# Patient Record
Sex: FEMALE | Race: WHITE | Employment: OTHER | URBAN - METROPOLITAN AREA
[De-identification: names, ages, dates, MRNs, and addresses within clinical notes are randomized per-mention and may not be internally consistent; named-entity substitution may affect disease eponyms.]

---

## 2017-01-12 ENCOUNTER — HOSPITAL ENCOUNTER (OUTPATIENT)
Dept: MAMMOGRAPHY | Age: 72
Discharge: HOME | End: 2017-01-12
Attending: FAMILY MEDICINE
Payer: MEDICARE

## 2017-01-12 DIAGNOSIS — Z78.0 POSTMENOPAUSAL: ICD-10-CM

## 2017-01-12 DIAGNOSIS — Z12.31 ENCOUNTER FOR SCREENING MAMMOGRAM FOR MALIGNANT NEOPLASM OF BREAST: ICD-10-CM

## 2017-01-12 PROCEDURE — 77067 SCR MAMMO BI INCL CAD: CPT

## 2017-01-12 PROCEDURE — 77080 DXA BONE DENSITY AXIAL: CPT

## 2017-01-25 ENCOUNTER — PATIENT OUTREACH (OUTPATIENT)
Dept: CASE MANAGEMENT | Age: 72
End: 2017-01-25

## 2017-01-25 NOTE — PROGRESS NOTES
CCM follow up call. Unable to reach patient. Left message with contact information requesting a return call. This note will not be viewable in [address]

## 2017-02-09 ENCOUNTER — PATIENT OUTREACH (OUTPATIENT)
Dept: CASE MANAGEMENT | Age: 72
End: 2017-02-09

## 2017-02-23 ENCOUNTER — PATIENT OUTREACH (OUTPATIENT)
Dept: CASE MANAGEMENT | Age: 72
End: 2017-02-23

## 2017-02-24 ENCOUNTER — PATIENT OUTREACH (OUTPATIENT)
Dept: CASE MANAGEMENT | Age: 72
End: 2017-02-24

## 2017-03-03 ENCOUNTER — PATIENT OUTREACH (OUTPATIENT)
Dept: CASE MANAGEMENT | Age: 72
End: 2017-03-03

## 2017-03-03 NOTE — PROGRESS NOTES
CCM follow up call. Patient requested a home visit to assist with insurance paperwork regarding home aide assistance for . Home visit scheduled 3/6/17. This note will not be viewable in [address]

## 2017-03-06 ENCOUNTER — PATIENT OUTREACH (OUTPATIENT)
Dept: CASE MANAGEMENT | Age: 72
End: 2017-03-06

## 2017-03-06 NOTE — PROGRESS NOTES
CCM home visit to assist patient with notifying insurance company regarding [facility] policy. Patient requested assistance due to feeling overwhelmed with 's declining health. Insurance company to mail package to be completed and returned. Patient to notify [facility] is assistance is required with completing forms. Patient reports she continues to attend Alzheimer's support group. This note will not be viewable in [address]

## 2017-03-20 ENCOUNTER — PATIENT OUTREACH (OUTPATIENT)
Dept: CASE MANAGEMENT | Age: 72
End: 2017-03-20

## 2017-03-20 NOTE — PROGRESS NOTES
Received call from patient regarding patient's  and his progress with CLTC. Patient has no issues regarding her health. She continues to provide care for  and to attend support meetings monthly. Patient in the process of obtaining assistance with providing care for her  once  is assessed by [employer]. When insurance agency approves assistance patient will notify home care agencies provided by [facility]. Episode resolved with patient. Patient to notify CCM with any future issues/concerns. This note will not be viewable in [address]

## 2018-02-01 ENCOUNTER — HOSPITAL ENCOUNTER (OUTPATIENT)
Dept: GENERAL RADIOLOGY | Age: 73
Discharge: HOME | End: 2018-02-01
Attending: FAMILY MEDICINE
Payer: MEDICARE

## 2018-02-01 DIAGNOSIS — R05.9 COUGH: ICD-10-CM

## 2018-02-01 PROCEDURE — 71046 X-RAY EXAM CHEST 2 VIEWS: CPT

## 2018-03-09 ENCOUNTER — HOSPITAL ENCOUNTER (OUTPATIENT)
Dept: MAMMOGRAPHY | Age: 73
Discharge: HOME | End: 2018-03-09
Attending: FAMILY MEDICINE
Payer: MEDICARE

## 2018-03-09 DIAGNOSIS — Z12.31 VISIT FOR SCREENING MAMMOGRAM: ICD-10-CM

## 2018-03-09 PROCEDURE — 77063 BREAST TOMOSYNTHESIS BI: CPT

## 2018-06-18 ENCOUNTER — HOSPITAL ENCOUNTER (OUTPATIENT)
Dept: GENERAL RADIOLOGY | Age: 73
Discharge: HOME | End: 2018-06-18
Payer: MEDICARE

## 2018-06-18 DIAGNOSIS — R22.41 KNEE MASS, RIGHT: ICD-10-CM

## 2018-06-18 PROCEDURE — 73562 X-RAY EXAM OF KNEE 3: CPT

## 2019-04-10 ENCOUNTER — HOSPITAL ENCOUNTER (OUTPATIENT)
Dept: MAMMOGRAPHY | Age: 74
Discharge: HOME | End: 2019-04-10
Attending: FAMILY MEDICINE
Payer: MEDICARE

## 2019-04-10 DIAGNOSIS — Z12.31 VISIT FOR SCREENING MAMMOGRAM: ICD-10-CM

## 2019-04-10 PROCEDURE — 77063 BREAST TOMOSYNTHESIS BI: CPT

## 2019-05-31 ENCOUNTER — APPOINTMENT (OUTPATIENT)
Dept: PAIN MANAGEMENT | Age: 74
End: 2019-05-31

## 2019-06-14 ENCOUNTER — HOSPITAL ENCOUNTER (OUTPATIENT)
Dept: CT IMAGING | Age: 74
Discharge: HOME | End: 2019-06-14
Attending: FAMILY MEDICINE

## 2019-06-14 DIAGNOSIS — R06.02 SHORTNESS OF BREATH: ICD-10-CM

## 2019-06-26 ENCOUNTER — APPOINTMENT (OUTPATIENT)
Dept: PHYSICAL THERAPY | Age: 74
End: 2019-06-26

## 2019-06-28 ENCOUNTER — HOSPITAL ENCOUNTER (OUTPATIENT)
Dept: PHYSICAL THERAPY | Age: 74
Discharge: HOME | End: 2019-06-28
Payer: MEDICARE

## 2019-06-28 DIAGNOSIS — G89.29 CHRONIC RIGHT-SIDED LOW BACK PAIN WITH RIGHT-SIDED SCIATICA: ICD-10-CM

## 2019-06-28 DIAGNOSIS — M54.41 CHRONIC RIGHT-SIDED LOW BACK PAIN WITH RIGHT-SIDED SCIATICA: ICD-10-CM

## 2019-06-28 PROCEDURE — 97112 NEUROMUSCULAR REEDUCATION: CPT

## 2019-06-28 PROCEDURE — 97161 PT EVAL LOW COMPLEX 20 MIN: CPT

## 2019-06-28 NOTE — THERAPY EVALUATION
Torie Wirtz : 1945 [place] Secondary: Scott Kurtz 77 at . Tiffanie Ordonez [address] [place] [address], [address] [place] [address] [address] [phone]   [phone] Visit Count:  1 
  
OUTPATIENT PHYSICAL THERAPY:Initial Assessment 2019 ICD-10: Treatment Diagnosis: Chronic right-sided low back pain with right-sided sciatica [M54.41, G89.29], Pain in Right Hip (M25.551) Difficulty in walking, Not elsewhere classified (R26.2) Low Back Pain (M54.5) Abnormal posture (R29.3) Precautions/Allergies:  
Sulfa (sulfonamide antibiotics) MD Orders: evaluate and treat  MEDICAL/REFERRING DIAGNOSIS: 
Chronic right-sided low back pain with right-sided sciatica [M54.41, G89.29] DATE OF ONSET: 5 years + ago REFERRING PHYSICIAN: Natalie Beltran MD 
RETURN PHYSICIAN APPOINTMENT: not specified INITIAL ASSESSMENT:  Ms. Alan Lares presents with functional limitations due to chronic sciatic-type symptoms. Evaluation reveals poor postural awareness and habits, decreased pain management, limited AROM of lumbar spine and hip, core weakness and s/s of possible pelvic innominate dysfunction. Pt will highly benefit from skilled PT to address problems below and improve quality of life. PROBLEM LIST (Impacting functional limitations): 1. Decreased Strength 2. Decreased ADL/Functional Activities 3. Decreased Transfer Abilities 4. Decreased Ambulation Ability/Technique 5. Decreased Balance 6. Increased Pain 7. Decreased Activity Tolerance 8. Decreased Flexibility/Joint Mobility 9. Decreased Knowledge of Precautions 10. Decreased [place] with Home Exercise Program INTERVENTIONS PLANNED: (Treatment may consist of any combination of the following) 1. Balance Exercise 2. Bed Mobility 3. Cold 4. Gait Training 5. Heat 6. Home Exercise Program (HEP) 7. Manual Therapy 8. Neuromuscular Re-education/Strengthening 9. Range of Motion (ROM) 10. Therapeutic Activites 11. Therapeutic Exercise/Strengthening TREATMENT PLAN: 
Effective Dates: 6/28/2019 TO 9/26/2019 (90 days). Frequency/Duration: 2 times a week for 90 Day(s) GOALS: (Goals have been discussed and agreed upon with patient.) SHORT-TERM FUNCTIONAL GOALS: Time Frame: 2-4 weeks 1. Pt will be independent with HEP focusing on core stability and promoting good spinal alignment. 2. Pt will report no symptoms of LE for 1-2 weeks demonstrating centralization of symptoms. 3. Pt will report pain level does not exceed 4/10 in a 1-2 week period of time to demonstrate pain management. DISCHARGE GOALS: Time Frame: 6-8 weeks 1. Pt will improve Oswestry score by at least 10 points 2. Pt will demonstrate functional AROM of lumbar spine all planes without report of pain to improve functional mobility. 3. Pt will be able to sustain regular exercise routine including aerobic exercise 3+ times per week without increased symptoms. 4. Pt will be able to play 18 holes of golf without significant right LE exacerbation demonstrating ability to return to active lifestyle without nerve irritation. .  
5. Pt will demonstrate at least 4+/5 right hip to improve gait and transfer mechanics. OUTCOME MEASURE:  
Tool Used: Modified Oswestry Low Back Pain Questionnaire Score:  Initial: 13/50  Most Recent: X/50 (Date: -- ) Interpretation of Score: Each section is scored on a 0-5 scale, 5 representing the greatest disability. The scores of each section are added together for a total score of 50. MEDICAL NECESSITY:  
· Patient is expected to demonstrate progress in strength, range of motion, balance and functional technique ·  to increase independence with ADLs and recreational activities · . REASON FOR SERVICES/OTHER COMMENTS: 
· Patient continues to require modification of therapeutic interventions to increase complexity of exercises · . Total Duration: PT Patient Time In/Time Out Time In: 1100 Time Out: 1200 Rehabilitation Potential For Stated Goals: Good Regarding Annelise Mccullough's therapy, I certify that the treatment plan above will be carried out by a therapist or under their direction. Thank you for this referral, 
Tami Leung, PT Referring Physician Signature: Messi Madison MD _______________________________ Date _____________ PAIN/SUBJECTIVE:  
Initial:   4/10 Post Session:  2/10 HISTORY:  
History of Injury/Illness (Reason for Referral): 
Pt states chronic low back with and sciatic symptoms over the last 10 years, intermittently. Had MRI about 5 years ago, stating bulging and degeneration at L4-5. Was offered steriodal epidural injections but refused . 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 When , pt reports she was more sedentary but would like to return to golf a few times per week but low back and LE pain prevents this activity. Pt states she is trying to go to gym at least 1 time per week but nervous about injuring her back. Symptoms: intermittent B low back pain, constant right buttock, lateral thigh pain with occasional groin involevment and LE lateral foot numbness Aggravating Factors: sitting, prolonged walking, squatting, and bending Relieving Factors: movement, standing up, motrin   
Pain: 5/10 presently, 10/10 best, 2/10 best  
 
Past Medical History/Comorbidities: Ms. Alondra Ardon  has a past medical history of Anxiety (3/13/2013), Chest pain, unspecified (3/13/2013), Dyslipidemia (3/13/2013), GERD (gastroesophageal reflux disease) (3/13/2013), Hypercholesterolemia, and Unstable ang[facility] ([facility] (3/13/2013). Ms. Alondra Ardon  has a past surgical history that includes hx pelvic laparoscopy; hx colonoscopy (2010); and hx breast biopsy. Social History/Living Environment:  
  lives alone Prior Level of Function/Work/Activity: 
Was pretty active prior to  death Dominant Side:  
      RIGHT Other Clinical Tests:   
      Imaging: YES Previous Treatment Approaches: PT 7 years ago Ambulatory/Rehab Services H2 Model Falls Risk Assessment Risk Factors: 
     No Risk Factors Identified Ability to Rise from Chair: 
     (1)  Pushes up, successful in one attempt Falls Prevention Plan: No modifications necessary Total: (5 or greater = High Risk): 1 ©2010 [facility] Patent #[id number]. Federal Law prohibits the replication, distribution or use without written permission from [facility] [employer] Current Medications:   
  
Current Outpatient Medications:  
  atorvastatin (LIPITOR) 10 mg tablet, Take 1 Tab by mouth daily. Office visit and labs required for further refills, Disp: 30 Tab, Rfl: 0 
  DULoxetine (CYMBALTA) 60 mg capsule, Take 1 Cap by mouth daily. , Disp: 90 Cap, Rfl: 3 
  methylPREDNISolone (MEDROL, MARCELLA,) 4 mg tablet, Per dose pack instructions, Disp: 1 Dose Pack, Rfl: 0 
  buPROPion XL (WELLBUTRIN XL) 150 mg tablet, Take 1 Tab by mouth every morning., Disp: 30 Tab, Rfl: 6 
  omeprazole (PRILOSEC) 40 mg capsule, Take 1 Cap by mouth daily. , Disp: 90 Cap, Rfl: 3 
  gabapentin (NEURONTIN) 100 mg capsule, Take 2 Caps by mouth every twelve (12) hours. , Disp: 360 Cap, Rfl: 3 Date Last Reviewed:  6/28/2019 Number of Personal Factors/Comorbidities that affect the Plan of Care: 0: LOW COMPLEXITY EXAMINATION:  
Observation/Orthostatic Postural Assessment:   
      Standing: · Forward HeadLeft · shoulder higher than right · Rounded Shoulders ·  Decreased Lumbar Lordosis Sitting: Forward Head · Rounded Shoulders · Crossing legs,  
· WBing L>R Palpation:   
Very tender of lower lumbar B paraspinals and right buttock, right SIJ and lateral right hip. ROM:   
Lumbar spine Date: 
6/28/19 Date: 
  
Direction  Parameters Parameters Flexion  50%  cs LE    
 Extension  75% cs low back Rotation  R: 50% L: 50% Side bending  R: 50% cs low back L: 100%   
hip Right limited flexion, IR and ER Strength:   
 
LE ROM  DATE 
6/28/19 DATE Hip flexion R: 4 
L: 4+ R:  
L:   
Hip Abduction  R: 4 
L: 4+ R:  
L:   
Hip Extension  R: 4 
L: 4+ R:  
L:   
Knee Flexion  R: 4+ 
L: 4+ R:  
L:   
Knee Extension  R: 4+ 
L: 4+ R:  
L:   
Ankle Dorsiflexion  R: 4+ 
L:4+ R:  
L:  
Ankle Plantarflexion  R:4+ 
L:4+ R: 
L:  
    
 
 
Special Tests:   
     + SLR right Neurological Screen: Myotomes: weakness not myotome pattern Dermatomes:  Intact Reflexes: will assess in future Neural Tension Tests:  SLR (+) sciatic R Sensation: mild decrease light touch lateral left foot only Functional Mobility:  
      Gait/Ambulation:  antalgic, altered arm swing, lurching, path deviations, scissoring and trunk sway increase to right Transfers:  Use of UE during sit to stand Bed Mobility:  Slow but independent Stairs:  Not tested Balance:   
      Decreased through RLE Body Structures Involved: 1. Nerves 2. Joints 3. Muscles Body Functions Affected: 1. Sensory/Pain 2. Neuromusculoskeletal 
3. Movement Related Activities and Participation Affected: 1. Mobility 2. Self Care 3. Community, Social and [place] Number of elements (examined above) that affect the Plan of Care: 3: MODERATE COMPLEXITY CLINICAL PRESENTATION:  
Presentation: Evolving clinical presentation with changing clinical characteristics: MODERATE COMPLEXITY CLINICAL DECISION MAKING:  
Use of outcome tool(s) and clinical judgement create a POC that gives a: Questionable prediction of patient's progress: MODERATE COMPLEXITY

## 2019-06-28 NOTE — PROGRESS NOTES
Sandi Aguilera  : 1945  [place]  Secondary: Scott Kurtz 75 at . Tiffanie Ordonez 39  [facility], [address]  [phone]   [phone]    Visit Count:  1     Treatment Diagnosis: Chronic right-sided low back pain with right-sided sciatica [M54.41, G89.29], Pain in Right Hip (M25.551)  Difficulty in walking, Not elsewhere classified (R26.2)  Low Back Pain (M54.5)  Abnormal posture (R29.3)  Precautions/Allergies:   Sulfa (sulfonamide antibiotics)   MD Orders: evaluate and treat  MEDICAL/REFERRING DIAGNOSIS:  Chronic right-sided low back pain with right-sided sciatica [M54.41, G89.29]   DATE OF ONSET: 5 years + ago  REFERRING PHYSICIAN: Dave James MD  RETURN PHYSICIAN APPOINTMENT: not specified          Pre-treatment Symptoms/Complaints:  Low back and right LE pain  Pain: Initial:   4/10 Post Session:  2/10   Medications Last Reviewed:  2019    Updated Objective Findings:  see IE      TREATMENT:     THERAPEUTIC EXERCISE: (0 minutes):  Exercises per grid below to improve mobility, strength and balance. Required moderate visual, verbal and manual cues to promote proper body alignment, promote proper body posture and promote proper body mechanics. Progressed resistance, range and repetitions as indicated. Date:  2019   Date:   Date:     Activity/Exercise Parameters Parameters Parameters   Education  POC, HEP, postural awareness      Prone lying  HEP: 5 min 2 x/day     Bridges  NV     Sciatic nerve glide  NV                           NEUROMUSCULAR RE-EDUCATION: (10 minutes):  Exercise/activities per grid below to improve balance, coordination, kinesthetic sense, posture and proprioception. Required moderate visual, verbal, manual and tactile cues to promote dynamic balance in standing and sitting.   · Standing, sitting, and prone postural positioning focusing on symmetry and pelvic/lumbar alinement ·      MANUAL THERAPY: ( minutes): MODALITIES: ( minutes):     [employer] Portal  Treatment/Session Summary:    · Response to Treatment:  godo understanding of HEP and POC. prone positiong centralized sciatic pain. .  · Communication/Consultation:  sent IE to MD  · Equipment provided today:  None today      Recommendations/Intent for next treatment session: Next visit will focus on evaluating for pelvic/innom rotation or upslip.     Treatment Plan of Care Effective Dates:  6/28/2019  to 9/26/2019    Total Treatment Billable Duration:  10 min + evaluation   PT Patient Time In/Time Out  Time In: 1100  Time Out: Jalen Hill PT    Future Appointments   Date Time Provider Na Andrew   7/1/2019  8:45 AM Efrain Cornell N [facility]   7/5/2019  9:30 AM Efrain Cornell N [facility]   7/8/2019  8:45 AM Laure Schneider, PT [facility]   7/10/2019  9:45 AM Nasima Beltran MD [facility]   7/12/2019  8:45 AM Laure Schneider, PT [facility]   7/17/2019  8:45 AM Laure Schneider, PT [facility]   7/19/2019  8:45 AM Laure Schneider, PT [facility]   7/22/2019  9:00 AM Laure Schneider, PT [facility]   7/26/2019  8:45 AM Laure Schneider, PT [facility]   7/29/2019  9:00 AM Kenan Jones, PT [facility]

## 2019-07-01 ENCOUNTER — HOSPITAL ENCOUNTER (OUTPATIENT)
Dept: PHYSICAL THERAPY | Age: 74
Discharge: HOME | End: 2019-07-01
Payer: MEDICARE

## 2019-07-01 PROCEDURE — 97140 MANUAL THERAPY 1/> REGIONS: CPT

## 2019-07-01 PROCEDURE — 97110 THERAPEUTIC EXERCISES: CPT

## 2019-07-01 NOTE — PROGRESS NOTES
Kae Canavan  : 1945  [place]  Secondary: Scott Kurtz 75 at . Chelsijoni Mery [address]  [facility], [address] [facility] [place]  [phone]   [phone]    Visit Count:  2     Treatment Diagnosis: Chronic right-sided low back pain with right-sided sciatica [M54.41, G89.29], Pain in Right Hip (M25.551)  Difficulty in walking, Not elsewhere classified (R26.2)  Low Back Pain (M54.5)  Abnormal posture (R29.3)  Precautions/Allergies:   Sulfa (sulfonamide antibiotics)   MD Orders: evaluate and treat  MEDICAL/REFERRING DIAGNOSIS:  Chronic right-sided low back pain with right-sided sciatica [M54.41, G89.29]   DATE OF ONSET: 5 years + ago  REFERRING PHYSICIAN: Autumn Sunshine MD  RETURN PHYSICIAN APPOINTMENT: not specified          Pre-treatment Symptoms/Complaints:  Played golf the other day but my R leg started to bother me at hole 14 and could barley make it through the rest of the holes. Pain: Initial:   10 Post Session:  6/10   Medications Last Reviewed:  2019    Updated Objective Findings:  R anterior innominant rotation (1 1/2 inch) corrected with MET to 1/2 inch     TREATMENT:     THERAPEUTIC EXERCISE: (17 minutes):  Exercises per grid below to improve mobility, strength and balance. Required moderate visual, verbal and manual cues to promote proper body alignment, promote proper body posture and promote proper body mechanics. Progressed resistance, range and repetitions as indicated.    Date:  2019   Date:   Date:     Activity/Exercise Parameters Parameters Parameters   Education  POC, HEP, postural awareness      Prone lying  HEP: 5 min 2 x/day     Bridges  10x10\"     Sciatic nerve glide  10x 3\" R seated     Piriformis stretching 3x30\" seated     Cat/cow 10x     Clamshells 10x10\"     Nustep 5 mins level 5 (treadmill nv?)         NEUROMUSCULAR RE-EDUCATION: (0 minutes):  Exercise/activities per grid below to improve balance, coordination, kinesthetic sense, posture and proprioception. Required moderate visual, verbal, manual and tactile cues to promote dynamic balance in standing and sitting. · Standing, sitting, and prone postural positioning focusing on symmetry and pelvic/lumbar alinement   ·      MANUAL THERAPY: (23 minutes): -MET-R ext/L flex 5 mins; followed by shotgun  -Piriformis release/ and contract/relax stretching in prone  -L1-S1 grade II-III P/A as tolerated    MODALITIES: ( minutes):     [employer] Portal  Treatment/Session Summary:    · Response to Treatment:  reduced anterior rotation of R innominant following MET. Very tender in R piriformis and restricted mobility. Good response to added exercises today, however quickly fatigued with clamshells on the R. .  · Communication/Consultation:  Exercises, stress and pain relationship, HEP  · Equipment provided today:  HEP handout      Recommendations/Intent for next treatment session: Next visit will focus on continuing to assess for pelvic/innom rotation or upslip. Hip/core strengthening and open book stretches?     Treatment Plan of Care Effective Dates:  6/28/2019  to 9/26/2019    Total Treatment Billable Duration:  40 mins  PT Patient Time In/Time Out  Time In: 0845  Time Out: [address]    Future Appointments   Date Time Provider Na Andrew   7/5/2019  9:30 AM Steve DIEGO [facility]   7/8/2019  8:45 AM Adielakira Morris, PT [facility]   7/10/2019  9:45 AM Geni Prado MD [facility] HTF HTF   7/12/2019  8:45 AM Adielakira Morris, PT [facility]   7/17/2019  8:45 AM Adiel Bud, PT [facility]   7/19/2019  8:45 AM Adiel Bud, PT [facility]   7/22/2019  9:00 AM Adiel Arturo, PT [facility]   7/26/2019  8:45 AM Pierpont Bud, PT [facility]   7/29/2019  9:00 AM Torito Pineda, PT [facility]

## 2019-07-08 ENCOUNTER — HOSPITAL ENCOUNTER (OUTPATIENT)
Dept: PHYSICAL THERAPY | Age: 74
Discharge: HOME | End: 2019-07-08
Payer: MEDICARE

## 2019-07-08 PROCEDURE — 97110 THERAPEUTIC EXERCISES: CPT

## 2019-07-08 PROCEDURE — 97140 MANUAL THERAPY 1/> REGIONS: CPT

## 2019-07-08 NOTE — PROGRESS NOTES
Marquez Jessica  : 1945  [place]  Secondary: Scott Kurtz 75 at . Tiffanie Ordonez 39  [facility]. [address], [address]  [phone]   [phone]    Visit Count:  3     Treatment Diagnosis: Chronic right-sided low back pain with right-sided sciatica [M54.41, G89.29], Pain in Right Hip (M25.551)  Difficulty in walking, Not elsewhere classified (R26.2)  Low Back Pain (M54.5)  Abnormal posture (R29.3)  Precautions/Allergies:   Sulfa (sulfonamide antibiotics)   MD Orders: evaluate and treat  MEDICAL/REFERRING DIAGNOSIS:  Chronic right-sided low back pain with right-sided sciatica [M54.41, G89.29]   DATE OF ONSET: 5 years + ago  REFERRING PHYSICIAN: [address] [place], [place] [address]Juan MD  RETURN PHYSICIAN APPOINTMENT: not specified          Pre-treatment Symptoms/Complaints:  Didn't play golf over the weekend, mostly due to weakness and fatigue. Things are feeling pretty good today, but leg caves in a bit due to pain. Pain: Initial:   6/10 Post Session:  6/10   Medications Last Reviewed:  2019    Updated Objective Findings:  R anterior innominant rotation (1 1/2 inch) corrected with MET to 1/2 inch     TREATMENT:     THERAPEUTIC EXERCISE: (28 minutes):  Exercises per grid below to improve mobility, strength and balance. Required moderate visual, verbal and manual cues to promote proper body alignment, promote proper body posture and promote proper body mechanics. Progressed resistance, range and repetitions as indicated.    Date:  2019   Date:  19 Date:     Activity/Exercise Parameters Parameters Parameters   Education  POC, HEP, postural awareness      Prone lying       Bridges  5x with TA brace 10x10\" with TA brace    Sciatic nerve glide  10x 3\" R seated 10x 3\" R seated    Piriformis stretching Passive by PT Passive by PT 2 mins    Cat/cow 10x and 3x saman pose 10x     Clamshells      Nustep 6 mins level 4  6 mins level 4  (treadmill nv?)    TA brace 3 mins-verbal and visual cues- 1 mins with PT resist isometric hip flexion alternating 5x10\" and 5x ea with PT resist isometric hip flex    Pot stirs Blue band 20x each way Blue band 20x each way    Bird dogs 10x-tactile cues 10x     Chops  10x green each    Monster walks  2x20ft                    NEUROMUSCULAR RE-EDUCATION: (0 minutes):  Exercise/activities per grid below to improve balance, coordination, kinesthetic sense, posture and proprioception. Required moderate visual, verbal, manual and tactile cues to promote dynamic balance in standing and sitting. · Standing, sitting, and prone postural positioning focusing on symmetry and pelvic/lumbar alinement   ·      MANUAL THERAPY: (12 minutes): -MET-R ext/L flex 5 mins; followed by shotgun (NOT NEEDED TODAY)  -Piriformis release/ and contract/relax stretching in prone  -L1-S1 grade II-III P/A as tolerated  -Thoracic mobilizations grade I-III P/A    MODALITIES: ( minutes):     [employer]  Treatment/Session Summary:    · Response to Treatment:  Pt did very well with exercises today. Verbal and tactile cues required to maintain form for TA bracing and chops. Very challenged with unresisted monster walks. .  · Communication/Consultation:  core engagement with golf swings. blood work checked at next MD visit due to pt reports of fatigue. · Equipment provided today:  None today      Recommendations/Intent for next treatment session: Next visit will focus on continuing to assess for pelvic/innom rotation or upslip. Hip/core strengthening and open book stretches?  TA engagement with rotational movements    Treatment Plan of Care Effective Dates:  6/28/2019  to 9/26/2019    Total Treatment Billable Duration:  40 mins  PT Patient Time In/Time Out  Time In: 0848  Time Out: [address]    Future Appointments   Date Time Provider Na Andrew   7/10/2019  9:45 AM Nasima Beltran MD [facility] HTF HTF   7/12/2019  8:45 AM Jamie Tomas [facility]   7/17/2019  8:45 AM Monica DIEGO [facility]   7/19/2019  8:45 AM Monica DIEGO [facility]   7/22/2019  9:00 AM Monica DIEGO [facility]   7/26/2019  8:45 AM Morgan Villanueva PT [facility]   7/29/2019  9:00 AM Jamie Tomas [facility]

## 2019-07-12 ENCOUNTER — HOSPITAL ENCOUNTER (OUTPATIENT)
Dept: PHYSICAL THERAPY | Age: 74
Discharge: HOME | End: 2019-07-12
Payer: MEDICARE

## 2019-07-12 NOTE — PROGRESS NOTES
OUTPATIENT DAILY NOTE    NAME/AGE/GENDER: Sandi Aguilera is a 69 y.o. female. DATE: 7/12/2019    Ms. Gan Carman for today's appointment due to not feeling well today.     Toni Jiménez

## 2019-07-17 ENCOUNTER — HOSPITAL ENCOUNTER (OUTPATIENT)
Dept: PHYSICAL THERAPY | Age: 74
Discharge: HOME | End: 2019-07-17
Payer: MEDICARE

## 2019-07-17 PROCEDURE — 97110 THERAPEUTIC EXERCISES: CPT

## 2019-07-17 PROCEDURE — 97140 MANUAL THERAPY 1/> REGIONS: CPT

## 2019-07-17 NOTE — PROGRESS NOTES
Geoff Spencer  : 1945  [place]  Secondary: Scott Kurtz 77 at . Tiffanie Ordonez 41  [facility], [address]  [phone]   [phone]    Visit Count:  4     Treatment Diagnosis: Chronic right-sided low back pain with right-sided sciatica [M54.41, G89.29], Pain in Right Hip (M25.551)  Difficulty in walking, Not elsewhere classified (R26.2)  Low Back Pain (M54.5)  Abnormal posture (R29.3)  Precautions/Allergies:   Sulfa (sulfonamide antibiotics)   MD Orders: evaluate and treat  MEDICAL/REFERRING DIAGNOSIS:  Chronic right-sided low back pain with right-sided sciatica [M54.41, G89.29]   DATE OF ONSET: 5 years + ago  REFERRING PHYSICIAN: Marija Young MD  RETURN PHYSICIAN APPOINTMENT: not specified          Pre-treatment Symptoms/Complaints:  Feeling better, was sick. Most pain still golfing. No abnormal blood work  Pain: Initial:   6/10 Post Session:  6/10   Medications Last Reviewed:  2019    Updated Objective Findings:  R anterior innominant rotation (1 1/2 inch) corrected with MET to 1/2 inch     TREATMENT:     THERAPEUTIC EXERCISE: (28 minutes):  Exercises per grid below to improve mobility, strength and balance. Required moderate visual, verbal and manual cues to promote proper body alignment, promote proper body posture and promote proper body mechanics. Progressed resistance, range and repetitions as indicated.    Date:  2019   Date:  19 Date:  19   Activity/Exercise Parameters Parameters Parameters   Education  POC, HEP, postural awareness      Prone lying       Bridges  5x with TA brace 10x10\" with TA brace    Sciatic nerve glide  10x 3\" R seated 10x 3\" R seated    Piriformis stretching Passive by PT Passive by PT 2 mins    Cat/cow 10x and 3x saman pose 10x  10x   Clamshells   10x5\" each side   Nustep 6 mins level 4  6 mins level 4  (treadmill nv?) 8 mins level 5   TA brace 3 mins-verbal and visual cues- 1 mins with PT resist isometric hip flexion alternating 5x10\" and 5x ea with PT resist isometric hip flex 7x10\" verbal cues   Pot stirs Blue band 20x each way Blue band 20x each way    Bird dogs 10x-tactile cues 10x     Chops  10x green each    Monster walks  2x20ft 2x20ft   DTKC   3x10\"   L stretch    10\" 5x       NEUROMUSCULAR RE-EDUCATION: (0 minutes):  Exercise/activities per grid below to improve balance, coordination, kinesthetic sense, posture and proprioception. Required moderate visual, verbal, manual and tactile cues to promote dynamic balance in standing and sitting. · Standing, sitting, and prone postural positioning focusing on symmetry and pelvic/lumbar alinement   ·      MANUAL THERAPY: (12 minutes): -MET-R ext/L flex 5 mins; followed by shotgun (NOT NEEDED TODAY)  -Piriformis release/ and contract/relax stretching in prone  -L1-S1 grade II-III P/A as tolerated  -Thoracic mobilizations grade I-III P/A    MODALITIES: ( minutes):     [email or web address] Portal  Treatment/Session Summary:    · Response to Treatment:  Very tender along spinous processes today. Very challenged with clamshells and monster walks due to glut weakness  · Communication/Consultation:  glut strengthening at home  · Equipment provided today:  None today      Recommendations/Intent for next treatment session: Next visit will focus on continuing to assess for pelvic/innom rotation or upslip. Hip/core strengthening and open book stretches?  TA engagement with rotational movements    Treatment Plan of Care Effective Dates:  6/28/2019  to 9/26/2019    Total Treatment Billable Duration:  40 mins  PT Patient Time In/Time Out  Time In: 0845  Time Out: [facility]    Future Appointments   Date Time Provider Na Andrew   7/19/2019  8:45 AM Elliot DIEGO [facility]   7/22/2019  9:00 AM [facility]   7/26/2019  8:45 AM Casey Dow PT [facility]   7/29/2019 9:00 [facility]

## 2019-07-19 ENCOUNTER — HOSPITAL ENCOUNTER (OUTPATIENT)
Dept: PHYSICAL THERAPY | Age: 74
Discharge: HOME | End: 2019-07-19
Payer: MEDICARE

## 2019-07-19 PROCEDURE — 97110 THERAPEUTIC EXERCISES: CPT

## 2019-07-19 NOTE — PROGRESS NOTES
Douglas Aguilar  : 1945  [place]  Secondary: Scott Kurtz 77 at . Tiffanie Mery 41  [facility], [address]  [phone]   [phone]    Visit Count:  5     Treatment Diagnosis: Chronic right-sided low back pain with right-sided sciatica [M54.41, G89.29], Pain in Right Hip (M25.551)  Difficulty in walking, Not elsewhere classified (R26.2)  Low Back Pain (M54.5)  Abnormal posture (R29.3)  Precautions/Allergies:   Sulfa (sulfonamide antibiotics)   MD Orders: evaluate and treat  MEDICAL/REFERRING DIAGNOSIS:  Chronic right-sided low back pain with right-sided sciatica [M54.41, G89.29]   DATE OF ONSET: 5 years + ago  REFERRING PHYSICIAN: Melanie Brush MD  RETURN PHYSICIAN APPOINTMENT: not specified          Pre-treatment Symptoms/Complaints:  Feeling better, and less pain  Pain: Initial:   4/10 Post Session:  4/10   Medications Last Reviewed:  2019    Updated Objective Findings:  R anterior innominant rotation (1 1/2 inch) corrected with MET to 1/2 inch     TREATMENT:     THERAPEUTIC EXERCISE: (40 minutes):  Exercises per grid below to improve mobility, strength and balance. Required moderate visual, verbal and manual cues to promote proper body alignment, promote proper body posture and promote proper body mechanics. Progressed resistance, range and repetitions as indicated.    Date:  2019   Date:  19 Date:  19 Date  19   Activity/Exercise Parameters Parameters Parameters    Education  POC, HEP, postural awareness       Prone lying        Bridges  5x with TA brace 10x10\" with TA brace  10x10\" with TA brace   Sciatic nerve glide  10x 3\" R seated 10x 3\" R seated     Piriformis stretching Passive by PT Passive by PT 2 mins  2 mins   Cat/cow 10x and 3x saman pose 10x  10x 10x   Clamshells   10x5\" each side 10x5\" each   Nustep 6 mins level 4  6 mins level 4  (treadmill nv?) 8 mins level 5 6 mins level 5   TA brace 3 mins-verbal and visual cues- 1 mins with PT resist isometric hip flexion alternating 5x10\" and 5x ea with PT resist isometric hip flex 7x10\" verbal cues    Pot stirs Blue band 20x each way Blue band 20x each way     Bird dogs 10x-tactile cues 10x   10x   Chops  10x green each     Monster walks  2x20ft 2x20ft 4x20ft yellow band   DTKC   3x10\" nt-headache laying flat for too long   L stretch    10\" 5x 10x10\"   Squat and lift    2x20 form raised step- 15#   Ball rolling stretch    3 mins              NEUROMUSCULAR RE-EDUCATION: (0 minutes):  Exercise/activities per grid below to improve balance, coordination, kinesthetic sense, posture and proprioception. Required moderate visual, verbal, manual and tactile cues to promote dynamic balance in standing and sitting. · Standing, sitting, and prone postural positioning focusing on symmetry and pelvic/lumbar alinement   ·      MANUAL THERAPY: (0 minutes): -MET-R ext/L flex 5 mins; followed by shotgun (NOT NEEDED TODAY)  -Piriformis release/ and contract/relax stretching in prone  -L1-S1 grade II-III P/A as tolerated  -Thoracic mobilizations grade I-III P/A    MODALITIES: ( minutes):     [employer] Portal  Treatment/Session Summary:    · Response to Treatment:  Very challenged with progression of strengthening exercises today. Minor shoulder pain reported with squat and lift that was relieved with cues to resume good form. No other increased in pain. · Communication/Consultation:  glut strengthening at home  · Equipment provided today:  None today      Recommendations/Intent for next treatment session: Next visit will focus on continuing to assess for pelvic/innom rotation or upslip. Hip/core strengthening and open book stretches?  TA engagement with rotational movements    Treatment Plan of Care Effective Dates:  6/28/2019  to 9/26/2019    Total Treatment Billable Duration:  40 mins  PT Patient Time In/Time Out  Time In: 0030  Time Out:  [facility]   Date Time Provider Na Andrew   7/22/2019  9:00 AM Claude Mayen [facility]   7/26/2019  8:45 AM [facility]   7/29/2019  9:00 AM Claude [facility]

## 2019-07-22 ENCOUNTER — HOSPITAL ENCOUNTER (OUTPATIENT)
Dept: PHYSICAL THERAPY | Age: 74
Discharge: HOME | End: 2019-07-22
Payer: MEDICARE

## 2019-07-22 PROCEDURE — 97110 THERAPEUTIC EXERCISES: CPT

## 2019-07-22 NOTE — PROGRESS NOTES
Dillan Bolton  : 1945  [place]  Secondary: Scott Kurtz 77 at . Tiffanie Ordonez 39  [facility]. [address], [address]  [phone]   [phone]    Visit Count:  6     Treatment Diagnosis: Chronic right-sided low back pain with right-sided sciatica [M54.41, G89.29], Pain in Right Hip (M25.551)  Difficulty in walking, Not elsewhere classified (R26.2)  Low Back Pain (M54.5)  Abnormal posture (R29.3)  Precautions/Allergies:   Sulfa (sulfonamide antibiotics)   MD Orders: evaluate and treat  MEDICAL/REFERRING DIAGNOSIS:  Chronic right-sided low back pain with right-sided sciatica [M54.41, G89.29]   DATE OF ONSET: 5 years + ago  REFERRING PHYSICIAN: Garo Camarillo MD  RETURN PHYSICIAN APPOINTMENT: not specified          Pre-treatment Symptoms/Complaints:  Feeling better, and less pain over the weekend  Pain: Initial:   3/10 Post Session:  3/10   Medications Last Reviewed:  2019    Updated Objective Findings:  R anterior innominant rotation (1 1/2 inch) corrected with MET to 1/2 inch     TREATMENT:     THERAPEUTIC EXERCISE: (40 minutes):  Exercises per grid below to improve mobility, strength and balance. Required moderate visual, verbal and manual cues to promote proper body alignment, promote proper body posture and promote proper body mechanics. Progressed resistance, range and repetitions as indicated.    Date:  2019   Date:  19 Date:  19 Date  19 Date  19   Activity/Exercise Parameters Parameters Parameters     Education  POC, HEP, postural awareness        Prone lying         Bridges  5x with TA brace 10x10\" with TA brace  10x10\" with TA brace    Sciatic nerve glide  10x 3\" R seated 10x 3\" R seated      Piriformis stretching Passive by PT Passive by PT 2 mins  2 mins 2 mins   Cat/cow 10x and 3x saman pose 10x  10x 10x 10x   Clamshells   10x5\" each side 10x5\" each    Nustep 6 mins level 4  6 mins level 4  (treadmill nv?) 8 mins level 5 6 mins level 5 6 mins level 5   TA brace 3 mins-verbal and visual cues- 1 mins with PT resist isometric hip flexion alternating 5x10\" and 5x ea with PT resist isometric hip flex 7x10\" verbal cues     Pot stirs Blue band 20x each way Blue band 20x each way      Bird dogs 10x-tactile cues 10x   10x 20x   Chops  10x green each   Green 10x each   Monster walks  2x20ft 2x20ft 4x20ft yellow band 2x20ft red band   DTKC   3x10\" nt-headache laying flat for too long    L stretch    10\" 5x 10x10\" 10x10\"   Squat and lift    2x20 form raised step- 15# 2x10 form raised step- 15#   Ball rolling stretch    3 mins 3 mins   Lumbar anti-rotation     Green 20x   STS     3# bar OH press and red band on thighs- 10x normal and 10x with 2\" pause       NEUROMUSCULAR RE-EDUCATION: (0 minutes):  Exercise/activities per grid below to improve balance, coordination, kinesthetic sense, posture and proprioception. Required moderate visual, verbal, manual and tactile cues to promote dynamic balance in standing and sitting. · Standing, sitting, and prone postural positioning focusing on symmetry and pelvic/lumbar alinement   ·      MANUAL THERAPY: (0 minutes): -MET-R ext/L flex 5 mins; followed by shotgun (NOT NEEDED TODAY)  -Piriformis release/ and contract/relax stretching in prone  -L1-S1 grade II-III P/A as tolerated  -Thoracic mobilizations grade I-III P/A    MODALITIES: ( minutes):     [employer] Portal  Treatment/Session Summary:    · Response to Treatment:  Very challenged with progression of strengthening exercises today. No pain with exercises, only muscle fatigue reported. · Communication/Consultation:  glut strengthening at home Going to the gym and continuing with strengthening exercises   · Equipment provided today:  None today      Recommendations/Intent for next treatment session: Next visit will focus on continuing to assess for pelvic/innom rotation or upslip.  Hip/core strengthening and open book stretches?  TA engagement with rotational movements    Treatment Plan of Care Effective Dates:  6/28/2019  to 9/26/2019    Total Treatment Billable Duration:  40 mins  PT Patient Time In/Time Out  Time In: 0900  Time Out: [address]    Future Appointments   Date Time Provider Na Andrew   7/26/2019  8:45 AM Simona Arteaga, PT [facility]   7/29/2019  9:00 AM Inna CANTUIGOR [facility]

## 2019-07-26 ENCOUNTER — HOSPITAL ENCOUNTER (OUTPATIENT)
Dept: PHYSICAL THERAPY | Age: 74
Discharge: HOME | End: 2019-07-26
Payer: MEDICARE

## 2019-07-26 PROCEDURE — 97112 NEUROMUSCULAR REEDUCATION: CPT

## 2019-07-26 PROCEDURE — 97110 THERAPEUTIC EXERCISES: CPT

## 2019-07-26 NOTE — PROGRESS NOTES
Nabil Delgado  : 1945  [place]  Secondary: Scott Diop at . Poojadenzelsaqibjoni Mery [address]  [facility]. [address], [address]  [phone]   [phone]    Visit Count:  7     Treatment Diagnosis: Chronic right-sided low back pain with right-sided sciatica [M54.41, G89.29], Pain in Right Hip (M25.551)  Difficulty in walking, Not elsewhere classified (R26.2)  Low Back Pain (M54.5)  Abnormal posture (R29.3)  Precautions/Allergies:   Sulfa (sulfonamide antibiotics)   MD Orders: evaluate and treat  MEDICAL/REFERRING DIAGNOSIS:  Chronic right-sided low back pain with right-sided sciatica [M54.41, G89.29]   DATE OF ONSET: 5 years + ago  REFERRING PHYSICIAN: Douglas Cardoso MD  RETURN PHYSICIAN APPOINTMENT: not specified          Pre-treatment Symptoms/Complaints: pt states that she did well during last therapy session but then after and since have felt increased hip and mid back pain. Pain: Initial:   3/10 Post Session:  3/10   Medications Last Reviewed:  2019    Updated Objective Findings:      TREATMENT:     THERAPEUTIC EXERCISE: (30 minutes):  Exercises per grid below to improve mobility, strength and balance. Required moderate visual, verbal and manual cues to promote proper body alignment, promote proper body posture and promote proper body mechanics. Progressed resistance, range and repetitions as indicated.    Date:  2019   Date:  19 Date:  19 Date  19 Date  19   Activity/Exercise Parameters Parameters Parameters      Education  POC, HEP, postural awareness         Prone lying          Bridges  5x with TA brace 10x10\" with TA brace  10x10\" with TA brace  With ball squeeze x 10, 5 sec hold   Sciatic nerve glide  10x 3\" R seated 10x 3\" R seated       Piriformis stretching Passive by PT Passive by PT 2 mins  2 mins 2 mins    Cat/cow 10x and 3x saman pose 10x  10x 10x 10x    Clamshells 10x5\" each side 10x5\" each     Nustep 6 mins level 4  6 mins level 4  (treadmill nv?) 8 mins level 5 6 mins level 5 6 mins level 5 7 min level 5    TA brace 3 mins-verbal and visual cues- 1 mins with PT resist isometric hip flexion alternating 5x10\" and 5x ea with PT resist isometric hip flex 7x10\" verbal cues   During sitting, supine exercises, and standing/walking. Pot stirs Blue band 20x each way Blue band 20x each way       Bird dogs 10x-tactile cues 10x   10x 20x Alternating arms x 5 B only   Chops  10x green each   Green 10x each    Monster walks  2x20ft 2x20ft 4x20ft yellow band 2x20ft red band    DTKC   3x10\" nt-headache laying flat for too long     L stretch    10\" 5x 10x10\" 10x10\"    Squat and lift    2x20 form raised step- 15# 2x10 form raised step- 15#    Ball rolling stretch    3 mins 3 mins 3-4 min   Lumbar anti-rotation     Green 20x  deferred to next visit    iso hip flexion       X 5 B with TA activiation and manual resist hip   STS     3# bar OH press and red band on thighs- 10x normal and 10x with 2\" pause Deferred to NV   Side stepping       In hallway,no resist 2 x 40 ft, cuing for TA engagemnt        NEUROMUSCULAR RE-EDUCATION: (10 minutes):  Exercise/activities per grid below to improve balance, coordination, kinesthetic sense, posture and proprioception. Required moderate visual, verbal, manual and tactile cues to promote dynamic balance in standing and sitting. · Standing, sitting, and prone postural positioning focusing on symmetry and pelvic/lumbar alinement   ·      MANUAL THERAPY: (0 minutes):    -MET-R ext/L flex 5 mins; followed by shotgun (NOT NEEDED TODAY)  -Piriformis release/ and contract/relax stretching in prone  -L1-S1 grade II-III P/A as tolerated  -Thoracic mobilizations grade I-III P/A    MODALITIES: ( minutes):     [employer] Portal  Treatment/Session Summary:  Assessed pelvic alignment and focused on strength and stability, did not perform new activities learned last visit due to flare up but will resume as able next visit. · Response to Treatment:  Pt challenged with proprioception of TA during activities, requiring cuing often. No increased pain with any activities today. · Communication/Consultation:  None today   · Equipment provided today:  None today      Recommendations/Intent for next treatment session: Next visit will focus on continuing to assess for pelvic/innom rotation or upslip. Continue progressing Hip/core strengthening through functional exercises. TA engagement with rotational movements  and open book stretches?     Treatment Plan of Care Effective Dates:  6/28/2019  to 9/26/2019    Total Treatment Billable Duration:  40 mins     Phyllis Dutton PT    Future Appointments   Date Time Provider Na Andrew   7/26/2019  8:45 AM AGNES [facility]RP[facility]   7/29/2019  9:00 AM Samra CANTURPIGOR [facility]

## 2019-07-29 ENCOUNTER — HOSPITAL ENCOUNTER (OUTPATIENT)
Dept: PHYSICAL THERAPY | Age: 74
Discharge: HOME | End: 2019-07-29
Payer: MEDICARE

## 2019-07-29 PROCEDURE — 97110 THERAPEUTIC EXERCISES: CPT

## 2019-07-29 NOTE — PROGRESS NOTES
Crystal Campuzano  : 1945  [place]  Secondary: Scott Kurtz 77 at . [address]oni Mery [address]  [facility]. [address], [address]  [phone]   [phone]    Visit Count:  8     Treatment Diagnosis: Chronic right-sided low back pain with right-sided sciatica [M54.41, G89.29], Pain in Right Hip (M25.551)  Difficulty in walking, Not elsewhere classified (R26.2)  Low Back Pain (M54.5)  Abnormal posture (R29.3)  Precautions/Allergies:   Sulfa (sulfonamide antibiotics)   MD Orders: evaluate and treat  MEDICAL/REFERRING DIAGNOSIS:  Chronic right-sided low back pain with right-sided sciatica [M54.41, G89.29]   DATE OF ONSET: 5 years + ago  REFERRING PHYSICIAN: [address] [place] [address], MD  RETURN PHYSICIAN APPOINTMENT: not specified          Pre-treatment Symptoms/Complaints: pt states doing well but still gets sore in L hip area. Pain: Initial:   3/10 Post Session:  3/10   Medications Last Reviewed:  2019    Updated Objective Findings:      TREATMENT:     THERAPEUTIC EXERCISE: (40 minutes):  Exercises per grid below to improve mobility, strength and balance. Required moderate visual, verbal and manual cues to promote proper body alignment, promote proper body posture and promote proper body mechanics. Progressed resistance, range and repetitions as indicated.    Date:  2019   Date:  19 Date:  19 Date  19 Date  19   Activity/Exercise Parameters Parameters Parameters       Education  POC, HEP, postural awareness       Local places to get involved in for community involvement   Prone lying           Bridges  5x with TA brace 10x10\" with TA brace  10x10\" with TA brace  With ball squeeze x 10, 5 sec hold    Sciatic nerve glide  10x 3\" R seated 10x 3\" R seated        Piriformis stretching Passive by PT Passive by PT 2 mins  2 mins 2 mins     Cat/cow 10x and 3x saman pose 10x  10x 10x 10x     Clamshells 10x5\" each side 10x5\" each      Nustep 6 mins level 4  6 mins level 4  (treadmill nv?) 8 mins level 5 6 mins level 5 6 mins level 5 7 min level 5  7 min level 5    TA brace 3 mins-verbal and visual cues- 1 mins with PT resist isometric hip flexion alternating 5x10\" and 5x ea with PT resist isometric hip flex 7x10\" verbal cues   During sitting, supine exercises, and standing/walking. Pot stirs Blue band 20x each way Blue band 20x each way     Green 20x   Bird dogs 10x-tactile cues 10x   10x 20x Alternating arms x 5 B only    Chops  10x green each   Green 10x each     Monster walks  2x20ft 2x20ft 4x20ft yellow band 2x20ft red band     DTKC   3x10\" nt-headache laying flat for too long      L stretch    10\" 5x 10x10\" 10x10\"  10x10\"   Squat and lift    2x20 form raised step- 15# 2x10 form raised step- 15#     Ball rolling stretch    3 mins 3 mins 3-4 min 3-4 min   Lumbar anti-rotation     Green 20x  deferred to next visit  Green 20x   iso hip flexion       X 5 B with TA activiation and manual resist hip    STS     3# bar OH press and red band on thighs- 10x normal and 10x with 2\" pause Deferred to NV 2x10 with 3# bar for fwd WS   Side stepping       In hallway,no resist 2 x 40 ft, cuing for TA engagemnt  In hallway,no resist 2 x 40 ft, cuing for TA engagemnt   QL/TFL stretch       Standing 2x20\"   Hip ext/abd       Standing with UE support and TA cues- 10x B no resistance       NEUROMUSCULAR RE-EDUCATION: (0 minutes):  Exercise/activities per grid below to improve balance, coordination, kinesthetic sense, posture and proprioception. Required moderate visual, verbal, manual and tactile cues to promote dynamic balance in standing and sitting. · Standing, sitting, and prone postural positioning focusing on symmetry and pelvic/lumbar alinement   ·      MANUAL THERAPY: (0 minutes):    -MET-R ext/L flex 5 mins; followed by shotgun (NOT NEEDED TODAY)  -Piriformis release/ and contract/relax stretching in prone  -L1-S1 grade II-III P/A as tolerated  -Thoracic mobilizations grade I-III P/A    MODALITIES: ( minutes):     [employer] Portal  Treatment/Session Summary:  Assessed pelvic alignment and focused on strength and stability, did not perform new activities learned last visit due to flare up but will resume as able next visit. · Response to Treatment:  Pt did well today. No pain with exercises. A lot of cues for TA activation and WS with standing hip ext/abd and pot stirs  · Communication/Consultation:  None today   · Equipment provided today:  None today      Recommendations/Intent for next treatment session: Next visit will focus on continuing to assess for pelvic/innom rotation or upslip. Continue progressing Hip/core strengthening through functional exercises. TA engagement with rotational movements  and open book stretches? Treatment Plan of Care Effective Dates:  6/28/2019  to 9/26/2019    Total Treatment Billable Duration:  40 mins  PT Patient Time In/Time Out  Time In: 0850  Time Out: 0930  Caryn Menard    No future appointments.

## 2019-08-05 ENCOUNTER — HOSPITAL ENCOUNTER (OUTPATIENT)
Dept: PHYSICAL THERAPY | Age: 74
Discharge: HOME | End: 2019-08-05
Payer: MEDICARE

## 2019-08-05 PROCEDURE — 97110 THERAPEUTIC EXERCISES: CPT

## 2019-08-05 NOTE — PROGRESS NOTES
Randell Rankin  : 1945  [place]  Secondary: Scott Kurtz 77 at . Tiffanie Ordonez 41  [address]. [address], [address]  [phone]   [phone]    Visit Count:  9     Treatment Diagnosis: Chronic right-sided low back pain with right-sided sciatica [M54.41, G89.29], Pain in Right Hip (M25.551)  Difficulty in walking, Not elsewhere classified (R26.2)  Low Back Pain (M54.5)  Abnormal posture (R29.3)  Precautions/Allergies:   Sulfa (sulfonamide antibiotics)   MD Orders: evaluate and treat  MEDICAL/REFERRING DIAGNOSIS:  Chronic right-sided low back pain with right-sided sciatica [M54.41, G89.29]   DATE OF ONSET: 5 years + ago  REFERRING PHYSICIAN: [address] [place], [place] [address]Serjio MD  RETURN PHYSICIAN APPOINTMENT: not specified          Pre-treatment Symptoms/Complaints: pt states hip has been feeling better. One episode where it gave out from her after driving to [address] [place] [address]. Pain: Initial:   3/10 Post Session:  3/10   Medications Last Reviewed:  2019    Updated Objective Findings:      TREATMENT:     THERAPEUTIC EXERCISE: (40 minutes):  Exercises per grid below to improve mobility, strength and balance. Required moderate visual, verbal and manual cues to promote proper body alignment, promote proper body posture and promote proper body mechanics. Progressed resistance, range and repetitions as indicated.    Date:  2019   Date:  19 Date:  19 Date  19 Date  19   Activity/Exercise Parameters Parameters Parameters        Education  POC, HEP, postural awareness       Local places to get involved in for community involvement    Prone lying            Bridges  5x with TA brace 10x10\" with TA brace  10x10\" with TA brace  With ball squeeze x 10, 5 sec hold  With yellow band 10x10\"   Sciatic nerve glide  10x 3\" R seated 10x 3\" R seated         Piriformis stretching Passive by PT Passive by PT 2 mins 2 mins 2 mins      Cat/cow 10x and 3x saman pose 10x  10x 10x 10x      Clamshells   10x5\" each side 10x5\" each    10x5\" yellow band on knees   Nustep 6 mins level 4  6 mins level 4  (treadmill nv?) 8 mins level 5 6 mins level 5 6 mins level 5 7 min level 5  7 min level 5  7 mins level 5   TA brace 3 mins-verbal and visual cues- 1 mins with PT resist isometric hip flexion alternating 5x10\" and 5x ea with PT resist isometric hip flex 7x10\" verbal cues   During sitting, supine exercises, and standing/walking. Marches with yellow band 20x   Pot stirs Blue band 20x each way Blue band 20x each way     Green 20x    Bird dogs 10x-tactile cues 10x   10x 20x Alternating arms x 5 B only     Chops  10x green each   Green 10x each      Monster walks  2x20ft 2x20ft 4x20ft yellow band 2x20ft red band      DTKC   3x10\" nt-headache laying flat for too long    10x10\"   L stretch    10\" 5x 10x10\" 10x10\"  10x10\" 10x10\"   Squat and lift    2x20 form raised step- 15# 2x10 form raised step- 15#   2x12- 15#   Ball rolling stretch    3 mins 3 mins 3-4 min 3-4 min    Lumbar anti-rotation     Green 20x  deferred to next visit  Green 20x    iso hip flexion       X 5 B with TA activiation and manual resist hip     STS     3# bar OH press and red band on thighs- 10x normal and 10x with 2\" pause Deferred to NV 2x10 with 3# bar for fwd WS    Side stepping       In hallway,no resist 2 x 40 ft, cuing for TA engagemnt  In hallway,no resist 2 x 40 ft, cuing for TA engagemnt 2x20ft and 2x15ft yellow band cues for TA    QL/TFL stretch       Standing 2x20\" Standing 3x30\"   Hip ext/abd       Standing with UE support and TA cues- 10x B no resistance Standing with UE support and TA cues- 2x10x B- yellow band on ankles       NEUROMUSCULAR RE-EDUCATION: (0 minutes):  Exercise/activities per grid below to improve balance, coordination, kinesthetic sense, posture and proprioception.   Required moderate visual, verbal, manual and tactile cues to promote dynamic balance in standing and sitting. · Standing, sitting, and prone postural positioning focusing on symmetry and pelvic/lumbar alinement   ·      MANUAL THERAPY: (0 minutes): -MET-R ext/L flex 5 mins; followed by shotgun (NOT NEEDED TODAY)  -Piriformis release/ and contract/relax stretching in prone  -L1-S1 grade II-III P/A as tolerated  -Thoracic mobilizations grade I-III P/A    MODALITIES: ( minutes):     [employer] [facility]  Treatment/Session Summary:  LBP reported with squat and lift, resolved with form correction for LE use verses back use. Pt able to progress well today with resisted glut strengthening. Pt stated feeling good with exercises  · Response to Treatment:  Pt did well today. No pain with exercises. A lot of cues for TA activation. No headache reported laying down today   · Communication/Consultation:  None today   · Equipment provided today:  None today      Recommendations/Intent for next treatment session: Next visit will focus on continuing to assess for pelvic/innom rotation or upslip. Continue progressing Hip/core strengthening through functional exercises. TA engagement with rotational movements  and open book stretches?     Treatment Plan of Care Effective Dates:  6/28/2019  to 9/26/2019    Total Treatment Billable Duration:  40 mins  PT Patient Time In/Time Out  Time In: 1951  Time Out: [address]    Future Appointments   Date Time Provider Na Andrew   8/9/2019  8:45 AM Marguerite Kim [facility]   8/12/2019  8:45 AM Alfonso DIEGO [facility]   8/16/2019  8:45 AM Alfonso DIEGO [facility]   8/19/2019  8:45 AM Alfonso DIEGO [facility]   8/23/2019  8:45 AM Marguerite Kim [facility]

## 2019-08-09 ENCOUNTER — HOSPITAL ENCOUNTER (OUTPATIENT)
Dept: PHYSICAL THERAPY | Age: 74
Discharge: HOME | End: 2019-08-09
Payer: MEDICARE

## 2019-08-09 NOTE — PROGRESS NOTES
Candida Nunes  : 1945  [place]  Secondary: Scott Kurtz 77 at . Tiffanie Ordonez 39  [facility]. [address], [address]  [phone]   [phone]        OUTPATIENT DAILY NOTE    NAME/AGE/GENDER: Candida Nunes is a 69 y.o. female. DATE: 2019    MsErik Thiago Barksdalerazia for today's appointment due to she over slept. Pt confirmed for next appointment.     Юлия Ac

## 2019-08-12 ENCOUNTER — HOSPITAL ENCOUNTER (OUTPATIENT)
Dept: PHYSICAL THERAPY | Age: 74
Discharge: HOME | End: 2019-08-12
Payer: MEDICARE

## 2019-08-12 NOTE — PROGRESS NOTES
Berna Drew  : 1945  [place]  Secondary: Scott Kurtz 75 at . Tiffanie Ordonez 39  [address] [place] [address] Drive. [facility], [address] [facility]  [phone]   [phone]        OUTPATIENT DAILY NOTE    NAME/AGE/GENDER: Berna Drew is a 69 y.o. female. DATE: 2019    Ms. Underwood Pages for today's appointment due to she is sick.     Viola Heimlich

## 2019-08-16 ENCOUNTER — HOSPITAL ENCOUNTER (OUTPATIENT)
Dept: PHYSICAL THERAPY | Age: 74
Discharge: HOME | End: 2019-08-16
Payer: MEDICARE

## 2019-08-16 PROCEDURE — 97110 THERAPEUTIC EXERCISES: CPT

## 2019-08-16 NOTE — PROGRESS NOTES
Shadi Bowden  : 1945  [place]  Secondary: Scott Kurtz 75 at . Tiffanie Ordonez 39  [address]. [address], [address]  [phone]   [phone]    Visit Count:  10     Treatment Diagnosis: Chronic right-sided low back pain with right-sided sciatica [M54.41, G89.29], Pain in Right Hip (M25.551)  Difficulty in walking, Not elsewhere classified (R26.2)  Low Back Pain (M54.5)  Abnormal posture (R29.3)  Precautions/Allergies:   Sulfa (sulfonamide antibiotics)   MD Orders: evaluate and treat  MEDICAL/REFERRING DIAGNOSIS:  Chronic right-sided low back pain with right-sided sciatica [M54.41, G89.29]   DATE OF ONSET: 5 years + ago  REFERRING PHYSICIAN: Rober Young MD  RETURN PHYSICIAN APPOINTMENT: not specified          Pre-treatment Symptoms/Complaints: pt states feeling better. She reports she has been sick all week with sinus infection. Pain: Initial:   3/10 Post Session:  3/10   Medications Last Reviewed:  2019    Updated Objective Findings:      TREATMENT:     THERAPEUTIC EXERCISE: (40 minutes):  Exercises per grid below to improve mobility, strength and balance. Required moderate visual, verbal and manual cues to promote proper body alignment, promote proper body posture and promote proper body mechanics. Progressed resistance, range and repetitions as indicated.    Date:  2019   Date:  19 Date:  19 Date  19 Date  19   Activity/Exercise Parameters Parameters Parameters         Education  POC, HEP, postural awareness       Local places to get involved in for community involvement  Muscle soreness vs pain   Prone lying             Bridges  5x with TA brace 10x10\" with TA brace  10x10\" with TA brace  With ball squeeze x 10, 5 sec hold  With yellow band 10x10\"    Sciatic nerve glide  10x 3\" R seated 10x 3\" R seated          Piriformis stretching Passive by PT Passive by PT 2 mins  2 mins 2 mins       Cat/cow 10x and 3x saman pose 10x  10x 10x 10x       Clamshells   10x5\" each side 10x5\" each    10x5\" yellow band on knees    Nustep 6 mins level 4  6 mins level 4  (treadmill nv?) 8 mins level 5 6 mins level 5 6 mins level 5 7 min level 5  7 min level 5  7 mins level 5 8 mins level 3   TA brace 3 mins-verbal and visual cues- 1 mins with PT resist isometric hip flexion alternating 5x10\" and 5x ea with PT resist isometric hip flex 7x10\" verbal cues   During sitting, supine exercises, and standing/walking.    Marches with yellow band 20x    Pot stirs Blue band 20x each way Blue band 20x each way     Green 20x  Green and blue 20x each   Bird dogs 10x-tactile cues 10x   10x 20x Alternating arms x 5 B only      Chops  10x green each   Green 10x each       Monster walks  2x20ft 2x20ft 4x20ft yellow band 2x20ft red band       DTKC   3x10\" nt-headache laying flat for too long    10x10\"    L stretch    10\" 5x 10x10\" 10x10\"  10x10\" 10x10\" 5x20\"   Squat and lift    2x20 form raised step- 15# 2x10 form raised step- 15#   2x12- 15# 2x12- 15#   Ball rolling stretch    3 mins 3 mins 3-4 min 3-4 min  3 mins   Lumbar anti-rotation     Green 20x  deferred to next visit  Green 20x     iso hip flexion       X 5 B with TA activiation and manual resist hip      STS     3# bar OH press and red band on thighs- 10x normal and 10x with 2\" pause Deferred to NV 2x10 with 3# bar for fwd WS  2x10 with 3# bar for fwd WS red band    Side stepping       In hallway,no resist 2 x 40 ft, cuing for TA engagemnt  In hallway,no resist 2 x 40 ft, cuing for TA engagemnt 2x20ft and 2x15ft yellow band cues for TA  Red band 20ft 4x with cues for TA contraction   QL/TFL stretch       Standing 2x20\" Standing 3x30\"    Hip ext/abd       Standing with UE support and TA cues- 10x B no resistance Standing with UE support and TA cues- 2x10x B- yellow band on ankles Red band 10x 2 x ea leg with UE support       NEUROMUSCULAR RE-EDUCATION: (0 minutes):  Exercise/activities per grid below to improve balance, coordination, kinesthetic sense, posture and proprioception. Required moderate visual, verbal, manual and tactile cues to promote dynamic balance in standing and sitting. · Standing, sitting, and prone postural positioning focusing on symmetry and pelvic/lumbar alinement   ·      MANUAL THERAPY: (0 minutes): -MET-R ext/L flex 5 mins; followed by shotgun (NOT NEEDED TODAY)  -Piriformis release/ and contract/relax stretching in prone  -L1-S1 grade II-III P/A as tolerated  -Thoracic mobilizations grade I-III P/A    MODALITIES: ( minutes):     [employer]  Treatment/Session Summary:  No LBP today or other pain reported today, just fatigue. Pt progressing well even though out for a week due to sinus infection  · Response to Treatment:  Pt did well today. Soreness reported but discovered as muscle fatigue. · Communication/Consultation:  muscle fatigue/vs pain   · Equipment provided today:  None today      Recommendations/Intent for next treatment session: Next visit will focus on continuing to assess for pelvic/innom rotation or upslip. Continue progressing Hip/core strengthening through functional exercises. TA engagement with rotational movements  and open book stretches?     Treatment Plan of Care Effective Dates:  6/28/2019  to 9/26/2019    Total Treatment Billable Duration:  40 mins  PT Patient Time In/Time Out  Time In: 0845  Time Out: [address] [facility] Appointments   Date Time Provider Na Andrew   8/19/2019  8:45 AM Gerre Harada [facility]   8/23/2019  8:45 AM Gerre Harada [facility]

## 2019-08-19 ENCOUNTER — HOSPITAL ENCOUNTER (OUTPATIENT)
Dept: PHYSICAL THERAPY | Age: 74
Discharge: HOME | End: 2019-08-19
Payer: MEDICARE

## 2019-08-19 PROCEDURE — 97110 THERAPEUTIC EXERCISES: CPT

## 2019-08-19 NOTE — PROGRESS NOTES
Berna Drew  : 1945  [place]  Secondary: Scott Kurtz 77 at . Tiffanie Ordonez 39  [facility]. [address], [address]  [phone]   [phone]    Visit Count:  11     Treatment Diagnosis: Chronic right-sided low back pain with right-sided sciatica [M54.41, G89.29], Pain in Right Hip (M25.551)  Difficulty in walking, Not elsewhere classified (R26.2)  Low Back Pain (M54.5)  Abnormal posture (R29.3)  Precautions/Allergies:   Sulfa (sulfonamide antibiotics)   MD Orders: evaluate and treat  MEDICAL/REFERRING DIAGNOSIS:  Chronic right-sided low back pain with right-sided sciatica [M54.41, G89.29]   DATE OF ONSET: 5 years + ago  REFERRING PHYSICIAN: Destinee Contreras MD  RETURN PHYSICIAN APPOINTMENT: not specified          Pre-treatment Symptoms/Complaints: pt states more mobility and doing better, but still some pain but what bothers me is the leg goes out from under me. Pain: Initial:   3/10 Post Session:  3/10   Medications Last Reviewed:  2019    Updated Objective Findings:      TREATMENT:     THERAPEUTIC EXERCISE: (38 minutes):  Exercises per grid below to improve mobility, strength and balance. Required moderate visual, verbal and manual cues to promote proper body alignment, promote proper body posture and promote proper body mechanics. Progressed resistance, range and repetitions as indicated.    Date:  2019   Date:  19 Date:  19 Date  19   Activity/Exercise Parameters Parameters Parameters         Education  POC, HEP, postural awareness      Local places to get involved in for community involvement  Muscle soreness vs pain    Prone lying             Bridges  5x with TA brace 10x10\" with TA brace  10x10\" with TA brace With ball squeeze x 10, 5 sec hold  With yellow band 10x10\"     Sciatic nerve glide  10x 3\" R seated 10x 3\" R seated          Piriformis stretching Passive by PT Passive by PT 2 mins  2 mins        Cat/cow 10x and 3x saman pose 10x  10x 10x        Clamshells   10x5\" each side 10x5\" each   10x5\" yellow band on knees     Nustep 6 mins level 4  6 mins level 4  (treadmill nv?) 8 mins level 5 6 mins level 5 7 min level 5  7 min level 5  7 mins level 5 8 mins level 3 8 mins level 5   TA brace 3 mins-verbal and visual cues- 1 mins with PT resist isometric hip flexion alternating 5x10\" and 5x ea with PT resist isometric hip flex 7x10\" verbal cues  During sitting, supine exercises, and standing/walking.    Marches with yellow band 20x     Pot stirs Blue band 20x each way Blue band 20x each way    Green 20x  Green and blue 20x each    Bird dogs 10x-tactile cues 10x   10x Alternating arms x 5 B only       Chops  10x green each          Monster walks  2x20ft 2x20ft 4x20ft yellow band        DTKC   3x10\" nt-headache laying flat for too long   10x10\"     L stretch    10\" 5x 10x10\"  10x10\" 10x10\" 5x20\" 5x20\"   Squat and lift    2x20 form raised step- 15#   2x12- 15# 2x12- 15# 3x10- 15#   Ball rolling stretch    3 mins 3-4 min 3-4 min  3 mins 3 mins   Lumbar anti-rotation      deferred to next visit  Green 20x      Step ups         Fwd/lat/bcwd 10x each R   STS     Deferred to NV 2x10 with 3# bar for fwd WS  2x10 with 3# bar for fwd WS red band  2x10 with 3# bar for fwd WS red band    Side stepping      In hallway,no resist 2 x 40 ft, cuing for TA engagemnt  In hallway,no resist 2 x 40 ft, cuing for TA engagemnt 2x20ft and 2x15ft yellow band cues for TA  Red band 20ft 4x with cues for TA contraction Red band 20ft 2x with cues for TA contraction   QL/TFL stretch      Standing 2x20\" Standing 3x30\"  Standing 3x30\" R   Hip ext/abd      Standing with UE support and TA cues- 10x B no resistance Standing with UE support and TA cues- 2x10x B- yellow band on ankles Red band 10x 2 x ea leg with UE support Red band 10x 2 x ea leg with UE support   Dead lift         Dead lift 20x with dowel iveth       NEUROMUSCULAR RE-EDUCATION: (0 minutes):  Exercise/activities per grid below to improve balance, coordination, kinesthetic sense, posture and proprioception. Required moderate visual, verbal, manual and tactile cues to promote dynamic balance in standing and sitting. · Standing, sitting, and prone postural positioning focusing on symmetry and pelvic/lumbar alinement   ·      MANUAL THERAPY: (0 minutes): -MET-R ext/L flex 5 mins; followed by shotgun (NOT NEEDED TODAY)  -Piriformis release/ and contract/relax stretching in prone  -L1-S1 grade II-III P/A as tolerated  -Thoracic mobilizations grade I-III P/A    MODALITIES: ( minutes):     [employer]  Treatment/Session Summary:  No LBP today or other pain reported today. Pt state muscle fatigue in R LE mostly. · Response to Treatment:  Pt did well today. No episodes on LE giving way  · Communication/Consultation:  muscle fatigue/vs pain   · Equipment provided today:  None today      Recommendations/Intent for next treatment session: Next visit will focus on continuing to assess for pelvic/innom rotation or upslip. Continue progressing Hip/core strengthening through functional exercises. TA engagement with rotational movements  and open book stretches?     Treatment Plan of Care Effective Dates:  6/28/2019  to 9/26/2019    Total Treatment Billable Duration:  38 mins  PT Patient Time In/Time Out  Time In: 2350  Time Out: 201 [facility] Appointments   Date Time Provider Na Andrew   8/23/2019  8:45 AM Maylin Hinders [facility]

## 2019-08-23 ENCOUNTER — HOSPITAL ENCOUNTER (OUTPATIENT)
Dept: PHYSICAL THERAPY | Age: 74
Discharge: HOME | End: 2019-08-23
Payer: MEDICARE

## 2019-08-23 PROCEDURE — 97110 THERAPEUTIC EXERCISES: CPT

## 2019-08-23 NOTE — THERAPY RECERTIFICATION
Marlen Reese  : 1945  [place]  Secondary: Scott Diop at . Tiffanie Ceasarfei [address]  [facility]. [address], [address] [facility]  [phone]   [phone]       Visit Count:  12     OUTPATIENT PHYSICAL THERAPY:Re-evaluation and Re-certification Y6/   ICD-10: Treatment Diagnosis: Chronic right-sided low back pain with right-sided sciatica [M54.41, G89.29], Pain in Right Hip (M25.551)  Difficulty in walking, Not elsewhere classified (R26.2)  Low Back Pain (M54.5)  Abnormal posture (R29.3)  Precautions/Allergies:   Sulfa (sulfonamide antibiotics)   MD Orders: evaluate and treat  MEDICAL/REFERRING DIAGNOSIS:  Chronic right-sided low back pain with right-sided sciatica   DATE OF ONSET: 5 years + ago  REFERRING PHYSICIAN: Lina Gilford, MD  RETURN PHYSICIAN APPOINTMENT: not specified     Re-evaluation: Marlen Reese has attended 12 PT sessions. She has demonstrated an increase in ROM and functional strength as well as balance. Pt continues to have pain with R SB and ext and chronic sciatic symptoms. Pt has demonstrated I with HEP and going out of town x2 weeks and wants to continue when she returns. Pt will continue to benefit from skilled PT for therapeutic exercises/activites and manual interventions as indicated to improve functional mobility and quality of life. INITIAL ASSESSMENT:  Ms. Wilbert Guerrero presents with functional limitations due to chronic sciatic-type symptoms. Evaluation reveals poor postural awareness and habits, decreased pain management, limited AROM of lumbar spine and hip, core weakness and s/s of possible pelvic innominate dysfunction. Pt will highly benefit from skilled PT to address problems below and improve quality of life. PROBLEM LIST (Impacting functional limitations):  1. Decreased Strength  2. Decreased ADL/Functional Activities  3. Decreased Transfer Abilities  4.  Decreased Ambulation Ability/Technique  5. Decreased Balance  6. Increased Pain  7. Decreased Activity Tolerance  8. Decreased Flexibility/Joint Mobility  9. Decreased Knowledge of Precautions  10. Decreased [place] with Home Exercise Program INTERVENTIONS PLANNED: (Treatment may consist of any combination of the following)  1. Balance Exercise  2. Bed Mobility  3. Cold  4. Gait Training  5. Heat  6. Home Exercise Program (HEP)  7. Manual Therapy  8. Neuromuscular Re-education/Strengthening  9. Range of Motion (ROM)  10. Therapeutic Activites  11. Therapeutic Exercise/Strengthening     1.  1.    TREATMENT PLAN:  Effective Dates: 6/28/2019 TO 10/26/2019 (130 days). Frequency/Duration: 2 times a week for 130 Day(s)    GOALS: (Goals have been discussed and agreed upon with patient.)  SHORT-TERM FUNCTIONAL GOALS: Time Frame: 2-4 weeks   1. Pt will be independent with HEP focusing on core stability and promoting good spinal alignment. (met)  2. Pt will report no symptoms of LE for 1-2 weeks demonstrating centralization of symptoms. 3. Pt will report pain level does not exceed 4/10 in a 1-2 week period of time to demonstrate pain management.  (met)  DISCHARGE GOALS: Time Frame: 6-8 weeks   1. Pt will improve Oswestry score by at least 10 points   2. Pt will demonstrate functional AROM of lumbar spine all planes without report of pain to improve functional mobility. 3. Pt will be able to sustain regular exercise routine including aerobic exercise 3+ times per week without increased symptoms. 4. Pt will be able to play 18 holes of golf without significant right LE exacerbation demonstrating ability to return to active lifestyle without nerve irritation. .   5. Pt will demonstrate at least 4+/5 right hip to improve gait and transfer mechanics.     OUTCOME MEASURE:   Tool Used: Modified Oswestry Low Back Pain Questionnaire  Score:  Initial: 13/50  Most Recent: 12/50 (Date: 8-23-19 )   Interpretation of Score: Each section is scored on a 0-5 scale, 5 representing the greatest disability. The scores of each section are added together for a total score of 50. ·   .Progression/Medical Necessity:   Patient demonstrates fair rehab potential due to higher previous functional level. She is I with HEP and continues to require therapeutic interventions to improve functional mobility  . ·   Total Duration:  PT Patient Time In/Time Out  Time In: 0845  Time Out: 0930         PAIN/SUBJECTIVE:   Initial:   4/10 Post Session:  2/10   HISTORY:   History of Injury/Illness (Reason for Referral):  Pt states chronic low back with and sciatic symptoms over the last 10 years, intermittently. Had MRI about 5 years ago, stating bulging and degeneration at L4-5. Was offered steriodal epidural injections but refused . 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 When , pt reports she was more sedentary but would like to return to golf a few times per week but low back and LE pain prevents this activity. Pt states she is trying to go to gym at least 1 time per week but nervous about injuring her back. Symptoms: intermittent B low back pain, constant right buttock, lateral thigh pain with occasional groin involevment and LE lateral foot numbness     Aggravating Factors: sitting, prolonged walking, squatting, and bending   Relieving Factors: movement, standing up, motrin    Pain: 5/10 presently, 10/10 best, 2/10 best     Past Medical History/Comorbidities:   Ms. Isela Arellano  has a past medical history of Anxiety (3/13/2013), Chest pain, unspecified (3/13/2013), Dyslipidemia (3/13/2013), GERD (gastroesophageal reflux disease) (3/13/2013), Hypercholesterolemia, and Unstable angina ([facility] [address] (3/13/2013). Ms. Isela Arellano  has a past surgical history that includes hx pelvic laparoscopy; hx colonoscopy (2010); and hx breast biopsy.   Social History/Living Environment:     lives alone    Prior Level of Function/Work/Activity:  Was pretty active prior to  death    Dominant Side:         RIGHT    Other Clinical Tests:          Imaging: YES     Previous Treatment Approaches:          PT 7 years ago   Ambulatory/Rehab Services H2 Model Falls Risk Assessment   Risk Factors:       No Risk Factors Identified Ability to Rise from Chair:       (1)  Pushes up, successful in one attempt   Falls Prevention Plan:       No modifications necessary   Total: (5 or greater = High Risk): 1   ©2010 [facility] [employer] All Rights Reserved. [facility] [employer] Patent #[id number]. Federal Law prohibits the replication, distribution or use without written permission from [facility] [employer]   Current Medications:       Current Outpatient Medications:     atorvastatin (LIPITOR) 10 mg tablet, Take 1 Tab by mouth daily. Office visit and labs required for further refills, Disp: 90 Tab, Rfl: 3    gabapentin (NEURONTIN) 100 mg capsule, Take 2 Caps by mouth every twelve (12) hours. , Disp: 360 Cap, Rfl: 3    DULoxetine (CYMBALTA) 60 mg capsule, Take 1 Cap by mouth daily. , Disp: 90 Cap, Rfl: 3    buPROPion XL (WELLBUTRIN XL) 150 mg tablet, Take 1 Tab by mouth every morning., Disp: 30 Tab, Rfl: 6    omeprazole (PRILOSEC) 40 mg capsule, Take 1 Cap by mouth daily. , Disp: 90 Cap, Rfl: 3   Date Last Reviewed:  8/23/2019     EXAMINATION:   Observation/Orthostatic Postural Assessment:          Standing:  · Forward HeadLeft   · shoulder higher than right   · Rounded Shoulders  ·  Decreased Lumbar Lordosis         Sitting: Forward Head  · Rounded Shoulders  · Crossing legs,   · WBing L>R        Palpation:    Very tender of lower lumbar B paraspinals and right buttock, right SIJ and lateral right hip.      ROM:    Lumbar spine Date:  6/28/19 Date:  8/23/19   Direction  Parameters Parameters   Flexion  50%  cs LE  58   Extension  75% cs low back  20*   Rotation  R: 50%  L: 50%    Side bending  R: 50% cs low back   L: 100% R:17*  L: 20   hip Right limited flexion, IR and ER R limited IR Strength:      LE ROM  DATE  6/28/19 DATE  8/23/19   Hip flexion R: 4  L: 4+ R: 4+  L: 4+   Hip Abduction  R: 4  L: 4+ R:   L:    Hip Extension  R: 4  L: 4+ R:   L:    Knee Flexion  R: 4+  L: 4+ R: 4+  L: 4+   Knee Extension  R: 4+  L: 4+ R: 5  L: 5   Ankle Dorsiflexion  R: 4+  L:4+ R:   L:   Ankle Plantarflexion  R:4+  L:4+ R:  L:            Special Tests:         + SLR right and + SLUMP    Neurological Screen:        Myotomes: weakness not myotome pattern         Dermatomes:  Intact         Reflexes: will assess in future        Neural Tension Tests:  SLR (+) sciatic R        Sensation: mild decrease light touch lateral left foot only     Functional Mobility:         Gait/Ambulation:  antalgic, altered arm swing, scissoring and trunk sway increase to right         Transfers:  Does not require UE to push up        Bed Mobility:  Slow but independent        Stairs:  Not tested      Balance:          Decreased through RLE       1.  1.  1.    CLINICAL PRESENTATION:   CLINICAL DECISION MAKING:

## 2019-08-23 NOTE — PROGRESS NOTES
Shadi Bowden  : 1945  [place]  Secondary: Scott Kurtz 75 at . Tiffanie Ordonez [address]  [address]. [address], [address]  [phone]   [phone]    Visit Count:  12     Treatment Diagnosis: Chronic right-sided low back pain with right-sided sciatica [M54.41, G89.29], Pain in Right Hip (M25.551)  Difficulty in walking, Not elsewhere classified (R26.2)  Low Back Pain (M54.5)  Abnormal posture (R29.3)  Precautions/Allergies:   Sulfa (sulfonamide antibiotics)   MD Orders: evaluate and treat  MEDICAL/REFERRING DIAGNOSIS:  Chronic right-sided low back pain with right-sided sciatica [M54.41, G89.29]   DATE OF ONSET: 5 years + ago  REFERRING PHYSICIAN: [address] [place], [place] [address]Rober MD  RETURN PHYSICIAN APPOINTMENT: not specified          Pre-treatment Symptoms/Complaints: pt states more mobility and doing better, but still some pain with sitting activities down R leg. Going away x2 weeks  Pain: Initial:   3/10 Post Session:  3/10   Medications Last Reviewed:  2019    Updated Objective Findings:      TREATMENT:     THERAPEUTIC EXERCISE: (38 minutes):  Exercises per grid below to improve mobility, strength and balance. Required moderate visual, verbal and manual cues to promote proper body alignment, promote proper body posture and promote proper body mechanics. Progressed resistance, range and repetitions as indicated.    Date:  2019   Date:  19 Date:  19 Date  19   Activity/Exercise Parameters Parameters Parameters          Education  POC, HEP, postural awareness      Local places to get involved in for community involvement  Muscle soreness vs pain  Re-eval/cert and edu on pathology and exercises   Prone lying              Bridges  5x with TA brace 10x10\" with TA brace  10x10\" with TA brace With ball squeeze x 10, 5 sec hold  With yellow band 10x10\"      Sciatic nerve glide  10x 3\" R seated 10x 3\" R seated        Supine and sitting 10x each and edu   Piriformis stretching Passive by PT Passive by PT 2 mins  2 mins      SKTC stretch 4x20\" R   Cat/cow 10x and 3x saman pose 10x  10x 10x      10x   Clamshells   10x5\" each side 10x5\" each   10x5\" yellow band on knees      Nustep 6 mins level 4  6 mins level 4  (treadmill nv?) 8 mins level 5 6 mins level 5 7 min level 5  7 min level 5  7 mins level 5 8 mins level 3 8 mins level 5 6 mins level 6   TA brace 3 mins-verbal and visual cues- 1 mins with PT resist isometric hip flexion alternating 5x10\" and 5x ea with PT resist isometric hip flex 7x10\" verbal cues  During sitting, supine exercises, and standing/walking.    Marches with yellow band 20x      Pot stirs Blue band 20x each way Blue band 20x each way    Green 20x  Green and blue 20x each     Bird dogs 10x-tactile cues 10x   10x Alternating arms x 5 B only        Chops  10x green each           Monster walks  2x20ft 2x20ft 4x20ft yellow band         DTKC   3x10\" nt-headache laying flat for too long   10x10\"      L stretch    10\" 5x 10x10\"  10x10\" 10x10\" 5x20\" 5x20\"    Squat and lift    2x20 form raised step- 15#   2x12- 15# 2x12- 15# 3x10- 15#    Ball rolling stretch    3 mins 3-4 min 3-4 min  3 mins 3 mins    Lumbar anti-rotation      deferred to next visit  Green 20x       Step ups         Fwd/lat/bcwd 10x each R    STS     Deferred to NV 2x10 with 3# bar for fwd WS  2x10 with 3# bar for fwd WS red band  2x10 with 3# bar for fwd WS red band  2x10 with 3# bar and emphasis on equal WB through LEs   Side stepping      In hallway,no resist 2 x 40 ft, cuing for TA engagemnt  In hallway,no resist 2 x 40 ft, cuing for TA engagemnt 2x20ft and 2x15ft yellow band cues for TA  Red band 20ft 4x with cues for TA contraction Red band 20ft 2x with cues for TA contraction    QL/TFL stretch      Standing 2x20\" Standing 3x30\"  Standing 3x30\" R    Hip ext/abd      Standing with UE support and TA cues- 10x B no resistance Standing with UE support and TA cues- 2x10x B- yellow band on ankles Red band 10x 2 x ea leg with UE support Red band 10x 2 x ea leg with UE support    Dead lift         Dead lift 20x with dowel iveth        NEUROMUSCULAR RE-EDUCATION: (0 minutes):  Exercise/activities per grid below to improve balance, coordination, kinesthetic sense, posture and proprioception. Required moderate visual, verbal, manual and tactile cues to promote dynamic balance in standing and sitting. · Standing, sitting, and prone postural positioning focusing on symmetry and pelvic/lumbar alinement   ·      MANUAL THERAPY: (0 minutes): -MET-R ext/L flex 5 mins; followed by shotgun (NOT NEEDED TODAY)  -Piriformis release/ and contract/relax stretching in prone  -L1-S1 grade II-III P/A as tolerated  -Thoracic mobilizations grade I-III P/A    MODALITIES: ( minutes):     VAZATA  Treatment/Session Summary:  No LBP today, mostly referral down R LE. Pt educated in pathology and stretches/exercises to continue with while on vacation. · Response to Treatment:  Pt did well today. No episodes on LE giving way  · Communication/Consultation:  Pt to continue with another PT when she returns,as primary PT is out at that time   · Equipment provided today:  HEP handout      Recommendations/Intent for next treatment session: Next visit will focus on continuing to assess for pelvic/innom rotation or upslip. Continue progressing Hip/core strengthening through functional exercises. TA engagement with rotational movements  and open book stretches?  Centralization of symptoms    Treatment Plan of Care Effective Dates:  6/28/2019  to 9/26/2019    Total Treatment Billable Duration:  38 mins  PT Patient Time In/Time Out  Time In: 0845  Time Out: [address]    Future Appointments   Date Time Provider Na Andrew   9/11/2019  9:00 AM Irma Jovel, PT [facility]   9/13/2019  9:00 AM Festus Monk, PT [facility]   9/18/2019  9:00 AM Laure Schneider, PT [facility]   9/20/2019  9:00 AM Laure Schneider, PT [facility]   9/25/2019  8:45 AM Efrain DIEGO [facility]   9/27/2019  8:45 AM Samra Rodriguez [facility]

## 2019-09-13 ENCOUNTER — HOSPITAL ENCOUNTER (OUTPATIENT)
Dept: PHYSICAL THERAPY | Age: 74
End: 2019-09-13

## 2019-09-18 ENCOUNTER — APPOINTMENT (OUTPATIENT)
Dept: PHYSICAL THERAPY | Age: 74
End: 2019-09-18

## 2019-09-20 ENCOUNTER — HOSPITAL ENCOUNTER (OUTPATIENT)
Dept: PHYSICAL THERAPY | Age: 74
End: 2019-09-20

## 2020-01-08 NOTE — THERAPY DISCHARGE
Latasha Floyd  : 1945  [place]  Secondary: Scott Diop at . [address]sijoni Meyr [address]  [facility]. [address], [address] [facility]  [phone]   [phone]       Visit Count:  12     OUTPATIENT PHYSICAL THERAPY:Discontinuation Summary    ICD-10: Treatment Diagnosis: Chronic right-sided low back pain with right-sided sciatica [M54.41, G89.29], Pain in Right Hip (M25.551)  Difficulty in walking, Not elsewhere classified (R26.2)  Low Back Pain (M54.5)  Abnormal posture (R29.3)  Precautions/Allergies:   Sulfa (sulfonamide antibiotics)   MD Orders: evaluate and treat  MEDICAL/REFERRING DIAGNOSIS:  Chronic right-sided low back pain with right-sided sciatica   DATE OF ONSET: 5 years + ago  REFERRING PHYSICIAN: Gauri Lan MD  RETURN PHYSICIAN APPOINTMENT: not specified     Latasha Floyd has been seen in physical therapy from 19 to 19 for 12 visits. Treatment has been discontinued at this time due to patient failing to return for additional treatment. The below goals were met prior to discontinuation. Some goals were not met due to patient failing to return for additional PT. Thank you for this referral.         1.  1.      1.  1.    TREATMENT PLAN:  Effective Dates: 2019 TO 10/26/2019 (130 days). Frequency/Duration: 2 times a week for 130 Day(s)    GOALS: (Goals have been discussed and agreed upon with patient.)  SHORT-TERM FUNCTIONAL GOALS: Time Frame: 2-4 weeks   1. Pt will be independent with HEP focusing on core stability and promoting good spinal alignment. (met)  2. Pt will report no symptoms of LE for 1-2 weeks demonstrating centralization of symptoms. 3. Pt will report pain level does not exceed 4/10 in a 1-2 week period of time to demonstrate pain management.  (met)  DISCHARGE GOALS: Time Frame: 6-8 weeks   1. Pt will improve Oswestry score by at least 10 points   2.  Pt will demonstrate functional AROM of lumbar spine all planes without report of pain to improve functional mobility. 3. Pt will be able to sustain regular exercise routine including aerobic exercise 3+ times per week without increased symptoms. 4. Pt will be able to play 18 holes of golf without significant right LE exacerbation demonstrating ability to return to active lifestyle without nerve irritation. .   5. Pt will demonstrate at least 4+/5 right hip to improve gait and transfer mechanics. OUTCOME MEASURE:   Tool Used: Modified Oswestry Low Back Pain Questionnaire  Score:  Initial: 13/50  Most Recent: 12/50 (Date: 8-23-19 )   Interpretation of Score: Each section is scored on a 0-5 scale, 5 representing the greatest disability. The scores of each section are added together for a total score of 50.         Danilo Naik PT DPT                                                   1.  1.  1.

## 2020-07-29 ENCOUNTER — HOSPITAL ENCOUNTER (OUTPATIENT)
Dept: MAMMOGRAPHY | Age: 75
Discharge: HOME | End: 2020-07-29
Attending: FAMILY MEDICINE
Payer: MEDICARE

## 2020-07-29 DIAGNOSIS — Z12.31 VISIT FOR SCREENING MAMMOGRAM: ICD-10-CM

## 2020-07-29 PROCEDURE — 77063 BREAST TOMOSYNTHESIS BI: CPT

## 2021-01-26 ENCOUNTER — HOSPITAL ENCOUNTER (OUTPATIENT)
Dept: GENERAL RADIOLOGY | Age: 76
Discharge: HOME | End: 2021-01-26

## 2021-01-26 DIAGNOSIS — M25.551 RIGHT HIP PAIN: ICD-10-CM

## 2021-07-15 ENCOUNTER — TRANSCRIBE ORDER (OUTPATIENT)
Dept: SCHEDULING | Age: 76
End: 2021-07-15

## 2021-07-15 DIAGNOSIS — Z12.31 VISIT FOR SCREENING MAMMOGRAM: Primary | ICD-10-CM

## 2021-08-03 ENCOUNTER — HOSPITAL ENCOUNTER (OUTPATIENT)
Dept: MAMMOGRAPHY | Age: 76
Discharge: HOME | End: 2021-08-03
Attending: FAMILY MEDICINE

## 2021-08-03 DIAGNOSIS — Z12.31 VISIT FOR SCREENING MAMMOGRAM: ICD-10-CM

## 2022-02-03 ENCOUNTER — HOSPITAL ENCOUNTER (OUTPATIENT)
Dept: LAB | Age: 77
Discharge: HOME | End: 2022-02-03

## 2022-02-03 PROCEDURE — 88312 SPECIAL STAINS GROUP 1: CPT

## 2022-02-03 PROCEDURE — 88305 TISSUE EXAM BY PATHOLOGIST: CPT

## 2022-05-23 ENCOUNTER — TELEPHONE (OUTPATIENT)
Dept: FAMILY MEDICINE CLINIC | Facility: CLINIC | Age: 77
End: 2022-05-23

## 2022-05-23 DIAGNOSIS — M54.40 LOW BACK PAIN WITH SCIATICA, SCIATICA LATERALITY UNSPECIFIED, UNSPECIFIED BACK PAIN LATERALITY, UNSPECIFIED CHRONICITY: Primary | ICD-10-CM

## 2022-05-23 NOTE — TELEPHONE ENCOUNTER
Patient states she is having back/sciatic pain and wants to know if she needs to go back to physical therapy. Please call patient at [phone] to advise.

## 2022-06-02 ENCOUNTER — HOSPITAL ENCOUNTER (OUTPATIENT)
Dept: PHYSICAL THERAPY | Age: 77
Setting detail: RECURRING SERIES
Discharge: HOME | End: 2022-06-05
Payer: MEDICARE

## 2022-06-02 PROCEDURE — 97161 PT EVAL LOW COMPLEX 20 MIN: CPT

## 2022-06-02 PROCEDURE — 97110 THERAPEUTIC EXERCISES: CPT

## 2022-06-02 NOTE — PROGRESS NOTES
Maggie Leos  : 1945  Primary: Medicare Part A And B  Secondary: [address] @ [facility]  [address] [facility] [address]  Phone: [phone]  Fax: [phone] Plan Frequency: 2    Plan of Care/Certification Expiration Date: 22      PT Visit Info: Total # of Visits to Date: 1      OUTPATIENT PHYSICAL THERAPY:OP NOTE TYPE: Treatment Note 2022       Episode  }Appt Desk              Treatment Diagnosis:   Low back pain (M54.5)  Lumbago with Sciatica Left side (M54.42)  Muscle Weakness, Generalized (M62.81)  Muscle spasm of back (M62.830)  Pain in left hip (M25.552)    Medical/Referring Diagnosis:  Low back pain, unspecified [M54.50]  Referring Physician:  Magda Huitron MD MD Orders:  PT Eval and Treat   Date of Onset:  Onset Date: 22     Allergies:   Sulfa antibiotics  Restrictions/Precautions:  No data recordedNo data recorded   Interventions Planned (Treatment may consist of any combination of the following):    Current Treatment Recommendations: Strengthening; ROM; Balance training; Functional mobility training; Transfer training; IADL training; Endurance training; Pain management; Manual Therapy - Joint Manipulation; Manual Therapy - Soft Tissue Mobilization; Return to work related activity; Home exercise program; Safety education & training; Patient/Caregiver education & training; Equipment evaluation, education, & procurement; Modalities; Integrated dry needling; Therapeutic activities     Subjective Comments:  please see inital eval  Initial:  /does not rate, soreness 10Post Session:        /dow snot erate, [id number]  Medications Last Reviewed:  2022  Updated Objective Findings:  See evaluation note from today  Treatment     THERAPEUTIC EXERCISE: (15 minutes):    Exercises per grid below to improve mobility, strength, balance, and coordination.   Required minimal visual, verbal, manual and tactile cues to promote proper body mechanics. Progressed resistance and repetitions as indicated. Date:  6/2/22 Date:   Date:     Activity/Exercise Parameters Parameters Parameters   education Diagnosis, prognosis, POC, HEP, anatomy/physiology of condition       Open book 10x     Cat/camel <-> childspose 10x     LTR 10x     L stretch 10x                     THERAPEUTIC ACTIVITY: ( 0 minutes): Therapeutic activities per grid below to improve mobility, strength, coordination, and dynamic movement of entire body to improve functional lifting, carrying, reaching, catching, and overhead activites. Required minimal visual, verbal, manual and tactile cues to promote coordination of bilateral, upper extremity(s), lower extremity(s) and promote motor control of bilateral, upper extremity(s), lower extremity(s). Date:   Date:   Date:     Activity/Exercise Parameters Parameters Parameters                                                   HEP Log Date   1.  cat/camel, L stretch, LTR, childsponse, open book 6/2/22   2.     3.    4.     5.        POC    Recertification Expiration Date  Plan of Care/Certification Expiration Date: 07/28/22   Visit Count  1    Number of Allowed Visits              Treatment/Session Summary:      Treatment Assessment:    please see initial eval   Communication/Consultation:       eval sent to MD   Equipment provided today:  HEP   Recommendations/Intent for next  treatment session:  Next visit will focus on strengthening, improving mobility, pain science.          Total Treatment Billable Duration:  15 minutes   Time In: 6712  Time Out: 1500    Sulma Hutchins, PT       Charge Capture  }Post Session Pain  [employer] Portal  MD Guidelines  Scanned Media  Benefits  MyChart    Future Appointments   Date Time Provider Bryan Kamara   6/13/2022  2:45 PM Mai Braga PT [facility]   6/15/2022  2:30 PM Mai Braga PT [facility]   6/20/2022  2:45 PM Sulma Fernando PT [facility] 6/22/2022  2:30 PM Sulmabernadine Fernando, PT [facility]   6/27/2022  2:45 PM Sulma LORETA Fernando, PT [facility]   6/29/2022  2:30 PM Guerrero Guillaume, PT [facility]   7/6/2022  2:30 PM Guerrero Guillaume, PT [facility]   7/8/2022 12:15 PM Sulma Citlali Gambino, PT [facility]   7/11/2022  2:30 PM Guerrero Guillaume, PT [facility]   7/13/2022  2:30 PM Guerrero Guillaume, PT [facility]   2/1/2023  8:15 AM Verner Shears, MD [facility]

## 2022-06-02 NOTE — THERAPY EVALUATION
Sayra Reeder  : 1945  Primary: Medicare Part A And B  Secondary: [address] @ [facility]  [address] [facility] [address]  Phone: [phone]  Fax: [phone] Plan Frequency: 2    Plan of Care/Certification Expiration Date: 22      PT Visit Info: Total # of Visits to Date: 1      OUTPATIENT PHYSICAL THERAPY:OP NOTE TYPE: Initial Assessment 2022               Episode  Appt Desk         Treatment Diagnosis:ICD-10: Treatment Diagnosis: Low back pain (M54.5)  Lumbago with Sciatica Left side (M54.42)  Muscle Weakness, Generalized (M62.81)  Muscle spasm of back (M62.830)  Pain in left hip (M25.552)    * No diagnoses found *  Medical/Referring Diagnosis:  Low back pain, unspecified [M54.50]  Referring Physician:  Aminta Glover MD MD Orders:  PT Eval and Treat   Return MD Appt:  TBD by patient  Date of Onset:  Onset Date: 22     Allergies:  Sulfa antibiotics  Restrictions/Precautions:    No data recordedNo data recorded   Medications Last Reviewed:  2022     SUBJECTIVE   History of Injury/Illness (Reason for Referral):  Pt has had chronic back pain and has had several bouts of therapy. Pt reports that she experiences anterior hip and thigh pain with occasional N/T. Pt reports pain has gotten progressively worse and will now come and go. Pt does report instances of leg giving way. Pt plays golf - pt does report she is able to play all 18 holes of golf, but pays for it the next day. Pt is going on vacation to [place]. Pt reports she can get relief with Tylenol or Advil and downward dog. Pt reports aggravating factors are getting in and out of car or low surface, walking up stairs. Pt reports she walks 2x/day for 20 minutes and 3x/day for 10 min pt reports she is pain the whole time while walking  Patient Stated Goal(s):   \"To have relief from pain and improve my mobility\"  Initial: does not rate number  /10 Post Session: does not rate number    /10  Past Medical History/Comorbidities:   Ms. Tamy Cox  has a past medical history of Anxiety, Chest pain, unspecified, Dyslipidemia, GERD (gastroesophageal reflux disease), Hypercholesterolemia, and Unstable angina ([facility] [address] Ms. Tamy Cox  has a past surgical history that includes Breast biopsy; pelvic laparoscopy; and Colonoscopy (2010). Social History/Living Environment:   Lives With: Alone  Type of Home: House     Prior Level of Function/Work/Activity:   Prior level of function: Independent  Current level of function: Independent with pain in left anterior hip and thigh, feelings of weakness in left LE and occassional N/T to knee along posterior aspect  Occupation: Retired  Leisure & Hobbies: golf, grandchildren  ADL Assistance: Independent  Homemaking Assistance: Independent  Mode of Transportation: Car  No data recorded   Learning:   Does the patient/guardian have any barriers to learning?: No barriers  Will there be a co-learner?: No  Is an  required?: No     Fall Risk Scale:    Total Score: 0  Alex Fall Risk: Low (0-24)           OBJECTIVE     Observation/Orthostatic Postural Assessment:  [x] This section not tested    General observations          Range of Motion   [] This section not tested    AROM     Lumbar   Lumbar Spine AROM : WFL,Painful  Flexion: hands to mid shin, end range pain  Extension: spin of scapular to midline, end range pain  Lateral Flexion Right: hand to knee, end range pain  Lateral Flexion Left: hand to knee, end range pain  Right Rotation: 25%  Left Rotation: 25%     Hip   General AROM LE: Right WFL,Left WFL     PROM    Hip            Strength  [] This section not tested      Right Left   Hip     Flexion 4+/5 4+/5   Extension 4+/5 4+/5   Abduction 4+/5 4+/5   Adduction 4+/5 4+/5   Knee     Flexion 4+/5 4+/5   Extension 5/5 5/5   Ankle     Dorsiflexion  5/5  5/5   Plantarflexion  4/5  4/5         Special Tests  [] This section not tested    Test/Result   Lumbar:       RADHA Test: R (-),L (-)  Prone Knee Bend Test: R (-),L (-)  SLR: R (-),L (-)  Crossed SLR: R (-),L (-)  Hip:               Joint Accessory Motion Assessment/Palpation   [x] This section not tested    Joint         Palpation           Neurological Screen  [] This section not tested    Test/Result   Dermatomes:       L UE:        R UE:        L LE: L1 (Inguinal Ligament): Diminished  L2 (Medial Anterior Thigh): Diminished  L4 (Medial Malleolus): Diminished      R LE:    Myotomes:       L UE:        R UE:        L LE:        R LE:    Reflexes:       Upper Quarter:        Lower Quarter            Functional Mobility  [] This section not tested    Test Result   Timed up and go  NT   30 second sit to stand 8 reps   6 minute walk test  NT   Single Leg Balance  NT       Test Comments   Sit to stand Increased use of momentum, knee valgus, radicular symptoms with pain to knee after completing   Squat Noe to reach to floor   Stair Negotiation NT         ASSESSMENT   Initial Assessment: Mrs Jennifer Patrick presents to physical therapy with decreased strength, ROM, joint mobility, functional mobility that has impacted his/her ability to complete roles and responsibilities. These S/S are consistent with chronic low back pain . Jake Magaña will benefit from skilled physical therapy (medically necessary) to address above deficits affecting participation in basic ADLs and functional mobility/tolerance. Patient will benefit from manual therapeutic techniques (stretching, joint mobilizations, soft tissue mobilization/myofascial release), therapeutic exercises and activities, postural strengthening/education, progressive resistance training, patient education, and comprehensive home exercises program to address current impairments and functional limitations. Problem List: (Impacting functional limitations):     Body Structures, Functions, Activity Limitations Requiring Skilled Therapeutic Intervention: Decreased functional mobility ; Decreased strength; Increased pain; Decreased posture; Decreased ADL status; Decreased ROM; Decreased body mechanics; Decreased tolerance to work activity; Decreased balance; Decreased coordination     Therapy Prognosis:   Therapy Prognosis: Good     Assessment Complexity:   Low Complexity  PLAN   Effective Dates: 6/2/22 TO Plan of Care/Certification Expiration Date: 07/28/22     Frequency/Duration: Plan Frequency: 2     Interventions Planned (Treatment may consist of any combination of the following):    Current Treatment Recommendations: Strengthening; ROM; Balance training; Functional mobility training; Transfer training; IADL training; Endurance training; Pain management; Manual Therapy - Joint Manipulation; Manual Therapy - Soft Tissue Mobilization; Return to work related activity; Home exercise program; Safety education & training; Patient/Caregiver education & training; Equipment evaluation, education, & procurement; Modalities; Integrated dry needling; Therapeutic activities         GOALS: (Goals have been discussed and agreed upon with patient.)   Short-Term Goals: 4 weeks  Goal Met   1. Regulo Crow will be independent with HEP to maintain functional gains made with therapy intervention. 1.  [] Date:   2. Regulo Crow will be able to complete sit to stand 13 reps for improved strength and balance with no radicular symptoms. 2.  [] Date:   3. Regulo Crow will participate in walking program x 6 minutes at a distance of 1200 ft to be comparable to age related norms. 3.  [] Date:   4. Regulo Crow will be able to sit for 60 min in order to drive in car and complete work related responsibilities. 4.  [] Date:              Long Term Goals: 8 weeks Goal Met   1. Regulo Crow will demonstrate a 10 point improvement on the Oswestry to show improvement in function and participation in ADLs/IADLs 1. [] Date:   2.  Regulo Crow will be able to pull/push 50 lbs in order to complete household chores without restriction. 2.  [] Date:   3. Dale Guo will demonstrate appropriate lifting technique from floor to waist level with 30 lbs and no cues from therapist to complete heavy household chores 3. [] Date:   4. Dale Guo will be able to carry 15 lbs in bilateral UE in order to complete household chores, community participation, and work needs. 4.  [] Date:   5. Dale Guo will participate in walking program x 6 min at >=1500 ft to prepare for return to golf. 5.  [] Date:       Outcome Measure: Tool Used: Modified Oswestry Low Back Pain Questionnaire  Score:  Initial: 17/50  Most Recent: X/50 (Date: -- )   Interpretation of Score: Each section is scored on a 0-5 scale, 5 representing the greatest disability. The scores of each section are added together for a total score of 50. Tool Used: 30 sec sit to stand  Score:  Initial: 8 reps,  Most Recent: X/50 (Date: -- )       Medical Necessity:   Skilled intervention continues to be required due to increased pain with driving, ADLs, IADLs, walking, and standing. Reason For Services/Other Comments:  Patient continues to require skilled intervention due to pain with driving, ADLs, IADLs, walking, and standing. Total Duration: 30 min plus treatment  Time In: 1415  Time Out: 1500    Regarding Yessi Ko Tranowman's therapy, I certify that the treatment plan above will be carried out by a therapist or under their direction.   Thank you for this referral,  Ida Okeefe PT     Referring Physician Signature: Omega Crowder MD                    Post Session Pain  Charge Capture   POC Link  Treatment Note Link  MD Guidelines  [facility]

## 2022-06-13 ENCOUNTER — HOSPITAL ENCOUNTER (OUTPATIENT)
Dept: PHYSICAL THERAPY | Age: 77
Setting detail: RECURRING SERIES
Discharge: HOME | End: 2022-06-16
Payer: MEDICARE

## 2022-06-13 PROCEDURE — 97110 THERAPEUTIC EXERCISES: CPT

## 2022-06-13 PROCEDURE — 97530 THERAPEUTIC ACTIVITIES: CPT

## 2022-06-13 NOTE — PROGRESS NOTES
Dale Guo  : 1945  Primary: Medicare Part A And B  Secondary: [address] @ [facility]  [address] [facility] [address]  Phone: [phone]  Fax: [phone] Plan Frequency: 2    Plan of Care/Certification Expiration Date: 22      PT Visit Info: Total # of Visits to Date: 2      OUTPATIENT PHYSICAL THERAPY:OP NOTE TYPE: Treatment Note 2022       Episode  }Appt Desk              Treatment Diagnosis:   Low back pain (M54.5)  Lumbago with Sciatica Left side (M54.42)  Muscle Weakness, Generalized (M62.81)  Muscle spasm of back (M62.830)  Pain in left hip (M25.552)    Medical/Referring Diagnosis:  Low back pain, unspecified [M54.50]  Referring Physician:  Omega Crowder MD MD Orders:  PT Eval and Treat   Date of Onset:  Onset Date: 22     Allergies:   Sulfa antibiotics  Restrictions/Precautions:  No data recordedNo data recorded   Interventions Planned (Treatment may consist of any combination of the following):    Current Treatment Recommendations: Strengthening; ROM; Balance training; Functional mobility training; Transfer training; IADL training; Endurance training; Pain management; Manual Therapy - Joint Manipulation; Manual Therapy - Soft Tissue Mobilization; Return to work related activity; Home exercise program; Safety education & training; Patient/Caregiver education & training; Equipment evaluation, education, & procurement; Modalities; Integrated dry needling; Therapeutic activities     Subjective Comments:  pt reports that she fell down stairs while on vacation. she reports she has had pain in her hip and feels \"tightness\" in mid-back.    Initial:  /does not rate, soreness 10Post Session:        /dow snot erate, [id number]  Medications Last Reviewed:  2022  Updated Objective Findings:  See evaluation note from today  Treatment     THERAPEUTIC EXERCISE: (28 minutes):    Exercises per grid below to improve mobility, strength, balance, and coordination. Required minimal visual, verbal, manual and tactile cues to promote proper body mechanics. Progressed resistance and repetitions as indicated. Date:  6/2/22 Date:  6/13/22 Date:     Activity/Exercise Parameters Parameters Parameters   education Diagnosis, prognosis, POC, HEP, anatomy/physiology of condition   Education on importance of gentle movement and walking to tolerance to maintain mobility and decrease tightness after fall and reduce irritability    Open book 10x 2 min left/right    Cat/camel <-> childspose 10x 2 min    LTR 10x 2 min    L stretch 10x 2 min    NuStep  8 min, lvl 3, 608 steps    Seated forward flexion/extension  10x    Sun salutation  5x        THERAPEUTIC ACTIVITY: ( 10 minutes): Therapeutic activities per grid below to improve mobility, strength, coordination, and dynamic movement of entire body to improve functional lifting, carrying, reaching, catching, and overhead activites. Required minimal visual, verbal, manual and tactile cues to promote coordination of bilateral, upper extremity(s), lower extremity(s) and promote motor control of bilateral, upper extremity(s), lower extremity(s). Date:  6/13/22 Date:   Date:     Activity/Exercise Parameters Parameters Parameters   Monster walks Teal band  3 x 12 ft     Side steps Teal band  3 x 12 ft                                         HEP Log Date   1.  cat/camel, L stretch, LTR, childsponse, open book 6/2/22   2.     3.    4.     5.            Treatment/Session Summary:      Treatment Assessment:    pt with high level of irritability today, however responds well to gentle self guided mobility drills in pain free tolerance.  pt has increased pain with transtion from sit ot stand, but seated spinal mobility drills decrease irritability and reduce pain with initial transition to standing from standing   Communication/Consultation:       None today   Equipment provided today:  HEP Recommendations/Intent for next  treatment session:  Next visit will focus on strengthening, improving mobility, pain science.          Total Treatment Billable Duration:  38 minutes 7 min un-timed due to rest  Time In: 1445  Time Out: 1530    Revaessence Oharaes, PT       Charge Capture  }Post Session Pain  MedBridge Portal  MD Guidelines  Scanned Media  Benefits  MyChart    Future Appointments   Date Time Provider Bryan Kamara   6/15/2022  2:30 PM Reva Shingles, PT [facility]   6/20/2022  2:45 PM Sulma LORETA Fernando, PT [facility]   6/22/2022  2:30 PM Sulma Glenn Cosme, PT [facility]   6/27/2022  2:45 PM Sulma R Kassie, PT [facility]   6/29/2022  2:30 PM Sluma LORETA Fernando, PT [facility]   7/6/2022  2:30 PM Reva Shingles, PT [facility]   7/8/2022 12:15 PM Sulma R Kassie, PT [facility]   7/11/2022  2:30 PM Reva Shingles, PT [facility]   7/13/2022  2:30 PM Sulma R Kassie, PT [facility]   2/1/2023  8:15 AM Severo Screen, MD [facility]

## 2022-06-15 ENCOUNTER — HOSPITAL ENCOUNTER (OUTPATIENT)
Dept: PHYSICAL THERAPY | Age: 77
Setting detail: RECURRING SERIES
Discharge: HOME | End: 2022-06-18
Payer: MEDICARE

## 2022-06-15 PROCEDURE — 97110 THERAPEUTIC EXERCISES: CPT

## 2022-06-15 PROCEDURE — 97530 THERAPEUTIC ACTIVITIES: CPT

## 2022-06-15 NOTE — PROGRESS NOTES
Awilda Patton  : 1945  Primary: Medicare Part A And B  Secondary: [address] @ [place]  [address] [facility] [address]  Phone: [phone]  Fax: [phone] Plan Frequency: 2    Plan of Care/Certification Expiration Date: 22      PT Visit Info: Total # of Visits to Date: 3      OUTPATIENT PHYSICAL THERAPY:OP NOTE TYPE: Treatment Note 6/15/2022       Episode  }Appt Desk              Treatment Diagnosis:   Low back pain (M54.5)  Lumbago with Sciatica Left side (M54.42)  Muscle Weakness, Generalized (M62.81)  Muscle spasm of back (M62.830)  Pain in left hip (M25.552)    Medical/Referring Diagnosis:  Low back pain, unspecified [M54.50]  Referring Physician:  Kenzie Patiño MD MD Orders:  PT Eval and Treat   Date of Onset:  Onset Date: 22     Allergies:   Sulfa antibiotics  Restrictions/Precautions:  No data recordedNo data recorded   Interventions Planned (Treatment may consist of any combination of the following):    Current Treatment Recommendations: Strengthening; ROM; Balance training; Functional mobility training; Transfer training; IADL training; Endurance training; Pain management; Manual Therapy - Joint Manipulation; Manual Therapy - Soft Tissue Mobilization; Return to work related activity; Home exercise program; Safety education & training; Patient/Caregiver education & training; Equipment evaluation, education, & procurement; Modalities; Integrated dry needling; Therapeutic activities     Subjective Comments:pt late for appointment today  pt reports that she is feeling much better and that the stretches helped calm down her back and leg pain. pt reports she could get in and out of car better today.   Initial:  /does not rate, soreness 10Post Session:        /dow snot erate, [id number]  Medications Last Reviewed:  6/15/2022  Updated Objective Findings:  See evaluation note from today  Treatment THERAPEUTIC EXERCISE: ( 15 minutes):    Exercises per grid below to improve mobility, strength, balance, and coordination. Required minimal visual, verbal, manual and tactile cues to promote proper body mechanics. Progressed resistance and repetitions as indicated. Date:  6/2/22 Date:  6/13/22 Date:  6/15/22   Activity/Exercise Parameters Parameters Parameters   education Diagnosis, prognosis, POC, HEP, anatomy/physiology of condition   Education on importance of gentle movement and walking to tolerance to maintain mobility and decrease tightness after fall and reduce irritability    Open book 10x 2 min left/right    Cat/camel <-> childspose 10x 2 min 3 min   LTR 10x 2 min    L stretch 10x 2 min    NuStep  8 min, lvl 3, 608 steps    Seated forward flexion/extension  10x    Sun salutation  5x 10x   Treadmill   2 - 2.5 mph, 17 calories, 6 min   Marching   Alternating  3 x 10 reps       THERAPEUTIC ACTIVITY: ( 15 minutes): Therapeutic activities per grid below to improve mobility, strength, coordination, and dynamic movement of entire body to improve functional lifting, carrying, reaching, catching, and overhead activites. Required minimal visual, verbal, manual and tactile cues to promote coordination of bilateral, upper extremity(s), lower extremity(s) and promote motor control of bilateral, upper extremity(s), lower extremity(s). Date:  6/13/22 Date:  6/15/22 Date:     Activity/Exercise Parameters Parameters Parameters   Monster walks Teal band  3 x 12 ft Teal band  3 x 12 ft    Side steps Teal band  3 x 12 ft Teal band  3 x 12 ft    Sit to stand  5 lb  3 x 5 reps                                  HEP Log Date   1.  cat/camel, L stretch, LTR, childsponse, open book 6/2/22   2.     3.    4.     5.            Treatment/Session Summary:      Treatment Assessment:    pt with high irritability with resisted hip flexion, but able to complete through full range with mild irritability.  pt centralizes symptoms with extension bias positions. Communication/Consultation:       None today   Equipment provided today:  HEP   Recommendations/Intent for next  treatment session:  Next visit will focus on strengthening, improving mobility, pain science.          Total Treatment Billable Duration:  30 minutes   Time In: 6550  Time Out: 2640    Marline Quinn, PT       Charge Capture  }Post Session Pain  MedBridge Portal  MD Guidelines  Scanned Media  Benefits  MyChart    Future Appointments   Date Time Provider [place]   6/20/2022  2:45 PM Marline Goodness, PT [facility]   6/22/2022  2:30 PM Marline Goodness, PT [facility]   6/27/2022  2:45 PM Marline Goodness, PT [facility]   6/29/2022  2:30 PM Marline Goodness, PT [facility]   7/6/2022  2:30 PM Marline Goodness, PT [facility]   7/8/2022 12:15 PM Sulma LORETA Fernando, PT [facility]   7/11/2022  2:30 PM Marline Goodness, PT [facility]   7/13/2022  2:30 PM Sulma Fernando, PT [facility]   2/1/2023  8:15 AM Kimberly Crook MD [facility]

## 2022-06-20 ENCOUNTER — HOSPITAL ENCOUNTER (OUTPATIENT)
Dept: PHYSICAL THERAPY | Age: 77
Setting detail: RECURRING SERIES
Discharge: HOME | End: 2022-06-23
Payer: MEDICARE

## 2022-06-20 PROCEDURE — 97110 THERAPEUTIC EXERCISES: CPT

## 2022-06-20 PROCEDURE — 97530 THERAPEUTIC ACTIVITIES: CPT

## 2022-06-20 NOTE — PROGRESS NOTES
Jake Magaña  : 1945  Primary: Medicare Part A And B  Secondary: [address] @ [address]  [address] [facility] [address]  Phone: [phone]  Fax: [phone] Plan Frequency: 2    Plan of Care/Certification Expiration Date: 22      PT Visit Info: Total # of Visits to Date: 3      OUTPATIENT PHYSICAL THERAPY:OP NOTE TYPE: Treatment Note 2022       Episode  }Appt Desk              Treatment Diagnosis:   Low back pain (M54.5)  Lumbago with Sciatica Left side (M54.42)  Muscle Weakness, Generalized (M62.81)  Muscle spasm of back (M62.830)  Pain in left hip (M25.552)    Medical/Referring Diagnosis:  Low back pain, unspecified [M54.50]  Referring Physician:  Jeferson Harrell MD MD Orders:  PT Eval and Treat   Date of Onset:  Onset Date: 22     Allergies:   Sulfa antibiotics  Restrictions/Precautions:  No data recordedNo data recorded   Interventions Planned (Treatment may consist of any combination of the following):    Current Treatment Recommendations: Strengthening; ROM; Balance training; Functional mobility training; Transfer training; IADL training; Endurance training; Pain management; Manual Therapy - Joint Manipulation; Manual Therapy - Soft Tissue Mobilization; Return to work related activity; Home exercise program; Safety education & training; Patient/Caregiver education & training; Equipment evaluation, education, & procurement; Modalities; Integrated dry needling; Therapeutic activities     Subjective Comments:  pt reports that she is doing a little better adn thought walking on treadmilll was good.   Initial:  /does not rate, soreness 10Post Session:        /dow snot erate, [id number]  Medications Last Reviewed:  2022  Updated Objective Findings:  See evaluation note from today  Treatment     THERAPEUTIC EXERCISE: ( 23 minutes):    Exercises per grid below to improve mobility, strength, balance, and coordination. Required minimal visual, verbal, manual and tactile cues to promote proper body mechanics. Progressed resistance and repetitions as indicated. Date:  6/2/22 Date:  6/13/22 Date:  6/15/22 Date:  6/20/22   Activity/Exercise Parameters Parameters Parameters    education Diagnosis, prognosis, POC, HEP, anatomy/physiology of condition   Education on importance of gentle movement and walking to tolerance to maintain mobility and decrease tightness after fall and reduce irritability     Open book 10x 2 min left/right     Cat/camel <-> childspose 10x 2 min 3 min    LTR 10x 2 min     L stretch 10x 2 min  2 min   NuStep  8 min, lvl 3, 608 steps     Seated forward flexion/extension  10x     Sun salutation  5x 10x 15 x   Treadmill   2 - 2.5 mph, 17 calories, 6 min  2.5 mph, 17 calories, 8 min   Marching   Alternating  3 x 10 reps    Bent Over Rows    8 lb, 23. 5 in height  Left/right  3 x 5 reps   Lat Pull    20 lb standing  3 x 8 reps       THERAPEUTIC ACTIVITY: ( 15 minutes): Therapeutic activities per grid below to improve mobility, strength, coordination, and dynamic movement of entire body to improve functional lifting, carrying, reaching, catching, and overhead activites. Required minimal visual, verbal, manual and tactile cues to promote coordination of bilateral, upper extremity(s), lower extremity(s) and promote motor control of bilateral, upper extremity(s), lower extremity(s).    Date:  6/13/22 Date:  6/15/22 Date:  6/20/22   Activity/Exercise Parameters Parameters Parameters   Monster walks Teal band  3 x 12 ft Teal band  3 x 12 ft    Side steps Teal band  3 x 12 ft Teal band  3 x 12 ft    Sit to stand  5 lb  3 x 5 reps 10 lb, 19 in   3 x 8 reps   Youcruit   10 lb, 3 x 150 ft                           HEP Log Date   1.  cat/camel, L stretch, LTR, childsponse, open book 6/2/22   2.     3.    4.     5.            Treatment/Session Summary:      Treatment Assessment:    pt is able to complete a full sun salutation today with only pain at end range flexion. pt progressing tolerance to weight and intensity of exercises. Communication/Consultation:       None today   Equipment provided today:  HEP   Recommendations/Intent for next  treatment session:  Next visit will focus on strengthening, improving mobility, pain science.          Total Treatment Billable Duration:  38 minutes 7 min un-timed due to rest  Time In: 1445  Time Out: 1530    Jolyne Gone, PT       Charge Capture  }Post Session Pain  MedBridge Portal  MD Guidelines  Scanned Media  Benefits  MyChart    Future Appointments   Date Time Provider Bryan Kamara   6/22/2022  2:30 PM Jolyne Gone, PT [facility] [place] [facility]   6/27/2022  2:45 PM Jolyne Gone, PT [facility]   6/29/2022  2:30 PM Jolyne Gone, PT [facility]   7/6/2022  2:30 PM Jolyne Gone, PT [facility]   7/8/2022 12:15 PM Jolyne Gone, PT [facility]   7/11/2022  2:30 PM Jolyne Gone, PT [facility]   7/13/2022  2:30 PM Jolyne Gone, PT [facility]   2/1/2023  8:15 AM Brigitte Thompson MD [facility]

## 2022-06-27 ENCOUNTER — HOSPITAL ENCOUNTER (OUTPATIENT)
Dept: PHYSICAL THERAPY | Age: 77
Setting detail: RECURRING SERIES
Discharge: HOME | End: 2022-06-30
Payer: MEDICARE

## 2022-06-27 PROCEDURE — 97530 THERAPEUTIC ACTIVITIES: CPT

## 2022-06-27 PROCEDURE — 97110 THERAPEUTIC EXERCISES: CPT

## 2022-06-27 NOTE — PROGRESS NOTES
Abby Greenberg  : 1945  Primary: Medicare Part A And B  Secondary: [address] @ [facility]  [address] [facility] [address]  Phone: [phone]  Fax: [phone] Plan Frequency: 2    Plan of Care/Certification Expiration Date: 22      PT Visit Info: Total # of Visits to Date: 4      OUTPATIENT PHYSICAL THERAPY:OP NOTE TYPE: Treatment Note 2022       Episode  }Appt Desk              Treatment Diagnosis:   Low back pain (M54.5)  Lumbago with Sciatica Left side (M54.42)  Muscle Weakness, Generalized (M62.81)  Muscle spasm of back (M62.830)  Pain in left hip (M25.552)    Medical/Referring Diagnosis:  Low back pain, unspecified [M54.50]  Referring Physician:  Jeanella Lanes, MD MD Orders:  PT Eval and Treat   Date of Onset:  Onset Date: 22      Allergies:   Sulfa antibiotics  Restrictions/Precautions:  No data recordedNo data recorded   Interventions Planned (Treatment may consist of any combination of the following):    Current Treatment Recommendations: Strengthening; ROM; Balance training; Functional mobility training; Transfer training; IADL training; Endurance training; Pain management; Manual Therapy - Joint Manipulation; Manual Therapy - Soft Tissue Mobilization; Return to work related activity; Home exercise program; Safety education & training; Patient/Caregiver education & training; Equipment evaluation, education, & procurement; Modalities; Integrated dry needling; Therapeutic activities     Subjective Comments:  pt reports that she has been doing better until Saturday when she went to [facility] grocery store and had a signfincant increase in pain. pt reports that she felt a bit better after stretching.   Initial:  /does not rate, soreness 10Post Session:        /dow snot erate, [id number]  Medications Last Reviewed:  2022  Updated Objective Findings:  None today  Treatment     THERAPEUTIC EXERCISE: ( 15 minutes):    Exercises per grid below to improve mobility, strength, balance, and coordination. Required minimal visual, verbal, manual and tactile cues to promote proper body mechanics. Progressed resistance and repetitions as indicated. Date:  6/2/22 Date:  6/13/22 Date:  6/15/22 Date:  6/20/22 Date:  6/27/22   Activity/Exercise Parameters Parameters Parameters     education Diagnosis, prognosis, POC, HEP, anatomy/physiology of condition   Education on importance of gentle movement and walking to tolerance to maintain mobility and decrease tightness after fall and reduce irritability      Open book 10x 2 min left/right      Cat/camel <-> childspose 10x 2 min 3 min     LTR 10x 2 min      L stretch 10x 2 min  2 min 2 min   NuStep  8 min, lvl 3, 608 steps      Seated forward flexion/extension  10x      Sun salutation  5x 10x 15 x    Treadmill   2 - 2.5 mph, 17 calories, 6 min  2.5 mph, 17 calories, 8 min 2.5 mph, incline 1, 31 calories, 8 min   Marching   Alternating  3 x 10 reps  15 lb band  3 x 10 reps   Bent Over Rows    8 lb, 23. 5 in height  Left/right  3 x 5 reps    Lat Pull    20 lb standing  3 x 8 reps        THERAPEUTIC ACTIVITY: ( 23 minutes): Therapeutic activities per grid below to improve mobility, strength, coordination, and dynamic movement of entire body to improve functional lifting, carrying, reaching, catching, and overhead activites. Required minimal visual, verbal, manual and tactile cues to promote coordination of bilateral, upper extremity(s), lower extremity(s) and promote motor control of bilateral, upper extremity(s), lower extremity(s).    Date:  6/13/22 Date:  6/15/22 Date:  6/20/22 Date:  6/27/22   Activity/Exercise Parameters Parameters Parameters    Monster walks Teal band  3 x 12 ft Teal band  3 x 12 ft     Side steps Teal band  3 x 12 ft Teal band  3 x 12 ft     Sit to stand  5 lb  3 x 5 reps 10 lb, 19 in   3 x 8 reps    Cunha Carry   10 lb, 3 x 150 ft 10 lb, 3 x 150 ft Step up    6 in  2 x 10 reps   Rack pull    45 lb  3 x 3 treps                HEP Log Date   1.  cat/camel, L stretch, LTR, childsponse, open book 6/2/22   2.     3.    4.     5.            Treatment/Session Summary:      Treatment Assessment:    pt responds well to tactile and verbal cues for initiation of rack pull. pt demos a good vertical bar path and scapular setting, but cues to increase hip hinge over knee flexion required. pt to begin sld next visit   Communication/Consultation:       None today   Equipment provided today:  HEP   Recommendations/Intent for next  treatment session:  Next visit will focus on strengthening, improving mobility, pain science.          Total Treatment Billable Duration:  38 minutes 7 min un-timed due to rest  Time In: 1445  Time Out: 1530    Alferd Filler, PT       Charge Capture  }Post Session Pain  MedBridge Portal  MD Guidelines  Scanned Media  Benefits  MyChart    Future Appointments   Date Time Provider Bryan Kamara   6/29/2022  2:30 PM Alferd Filler, PT [facility]   7/6/2022  2:30 PM Alferd Filler, PT [facility]   7/8/2022 12:15 PM Alferd Filler, PT [facility]   7/11/2022  2:30 PM Alferd Filler, PT [facility]   7/13/2022  2:30 PM Alferd Filler, PT [facility]   2/1/2023  8:15 AM Mauricio Sierra MD [facility]

## 2022-06-29 ENCOUNTER — HOSPITAL ENCOUNTER (OUTPATIENT)
Dept: PHYSICAL THERAPY | Age: 77
Setting detail: RECURRING SERIES
Discharge: HOME | End: 2022-07-02
Payer: MEDICARE

## 2022-06-29 PROCEDURE — 97110 THERAPEUTIC EXERCISES: CPT

## 2022-06-29 PROCEDURE — 97530 THERAPEUTIC ACTIVITIES: CPT

## 2022-06-29 NOTE — PROGRESS NOTES
Juan Clemons  : 1945  Primary: Medicare Part A And B  Secondary: [address] @ [place]  [address] [facility] [address]  Phone: [phone]  Fax: [phone] Plan Frequency: 2    Plan of Care/Certification Expiration Date: 22      PT Visit Info: Total # of Visits to Date: 5      OUTPATIENT PHYSICAL THERAPY:OP NOTE TYPE: Treatment Note 2022       Episode  }Appt Desk              Treatment Diagnosis:   Low back pain (M54.5)  Lumbago with Sciatica Left side (M54.42)  Muscle Weakness, Generalized (M62.81)  Muscle spasm of back (M62.830)  Pain in left hip (M25.552)    Medical/Referring Diagnosis:  Low back pain, unspecified [M54.50]  Referring Physician:  Sheri Sharpe MD MD Orders:  PT Eval and Treat   Date of Onset:  Onset Date: 22      Allergies:   Sulfa antibiotics  Restrictions/Precautions:  No data recordedNo data recorded   Interventions Planned (Treatment may consist of any combination of the following):    Current Treatment Recommendations: Strengthening; ROM; Balance training; Functional mobility training; Transfer training; IADL training; Endurance training; Pain management; Manual Therapy - Joint Manipulation; Manual Therapy - Soft Tissue Mobilization; Return to work related activity; Home exercise program; Safety education & training; Patient/Caregiver education & training; Equipment evaluation, education, & procurement; Modalities; Integrated dry needling; Therapeutic activities     Subjective Comments:  pt reports that she was able to get up and down in the garden. Initial:  /does not rate, soreness 10Post Session:        /dow snot erate, [id number]  Medications Last Reviewed:  2022  Updated Objective Findings:  None today  Treatment     THERAPEUTIC EXERCISE: ( 15 minutes):    Exercises per grid below to improve mobility, strength, balance, and coordination.   Required minimal visual, verbal, manual and tactile cues to promote proper body mechanics. Progressed resistance and repetitions as indicated. Date:  6/2/22 Date:  6/13/22 Date:  6/15/22 Date:  6/20/22 Date:  6/27/22 Date:  6/29/22   Activity/Exercise Parameters Parameters Parameters      education Diagnosis, prognosis, POC, HEP, anatomy/physiology of condition   Education on importance of gentle movement and walking to tolerance to maintain mobility and decrease tightness after fall and reduce irritability       Open book 10x 2 min left/right       Cat/camel <-> childspose 10x 2 min 3 min      LTR 10x 2 min       L stretch 10x 2 min  2 min 2 min 2 min   NuStep  8 min, lvl 3, 608 steps       Seated forward flexion/extension  10x       Sun salutation  5x 10x 15 x     Treadmill   2 - 2.5 mph, 17 calories, 6 min  2.5 mph, 17 calories, 8 min 2.5 mph, incline 1, 31 calories, 8 min 3.0 mph, incline 1, 40 calories, 8 min   Marching   Alternating  3 x 10 reps  15 lb band  3 x 10 reps    Bent Over Rows    8 lb, 23. 5 in height  Left/right  3 x 5 reps     Lat Pull    20 lb standing  3 x 8 reps     Planks      38 in  4 x 30 sec       THERAPEUTIC ACTIVITY: ( 23 minutes): Therapeutic activities per grid below to improve mobility, strength, coordination, and dynamic movement of entire body to improve functional lifting, carrying, reaching, catching, and overhead activites. Required minimal visual, verbal, manual and tactile cues to promote coordination of bilateral, upper extremity(s), lower extremity(s) and promote motor control of bilateral, upper extremity(s), lower extremity(s).    Date:  6/13/22 Date:  6/15/22 Date:  6/20/22 Date:  6/27/22 Date:  6/29/22   Activity/Exercise Parameters Parameters Parameters     Monster walks Teal band  3 x 12 ft Teal band  3 x 12 ft      Side steps Teal band  3 x 12 ft Teal band  3 x 12 ft      Sit to stand  5 lb  3 x 5 reps 10 lb, 19 in   3 x 8 reps     [employer]   10 lb, 3 x 150 ft 10 lb, 3 x 150 ft    Step up

## 2022-07-06 ENCOUNTER — HOSPITAL ENCOUNTER (OUTPATIENT)
Dept: PHYSICAL THERAPY | Age: 77
Setting detail: RECURRING SERIES
Discharge: HOME | End: 2022-07-09
Payer: MEDICARE

## 2022-07-06 PROCEDURE — 97530 THERAPEUTIC ACTIVITIES: CPT

## 2022-07-06 PROCEDURE — 97110 THERAPEUTIC EXERCISES: CPT

## 2022-07-06 NOTE — PROGRESS NOTES
Feli Reza  : 1945  Primary: Medicare Part A And B  Secondary: [address] @ [facility]  [address] [facility] [address]  Phone: [phone]  Fax: [phone] Plan Frequency: 2    Plan of Care/Certification Expiration Date: 22      PT Visit Info: Total # of Visits to Date: 6      OUTPATIENT PHYSICAL THERAPY:OP NOTE TYPE: Treatment Note 2022       Episode  }Appt Desk              Treatment Diagnosis:   Low back pain (M54.5)  Lumbago with Sciatica Left side (M54.42)  Muscle Weakness, Generalized (M62.81)  Muscle spasm of back (M62.830)  Pain in left hip (M25.552)    Medical/Referring Diagnosis:  Low back pain, unspecified [M54.50]  Referring Physician:  Oumar Arango MD MD Orders:  PT Eval and Treat   Date of Onset:  Onset Date: 22      Allergies:   Sulfa antibiotics  Restrictions/Precautions:  Restrictions/Precautions: None  No data recorded   Interventions Planned (Treatment may consist of any combination of the following):    Current Treatment Recommendations: Strengthening; ROM; Balance training; Functional mobility training; Transfer training; IADL training; Endurance training; Pain management; Manual Therapy - Joint Manipulation; Manual Therapy - Soft Tissue Mobilization; Return to work related activity; Home exercise program; Safety education & training; Patient/Caregiver education & training; Equipment evaluation, education, & procurement; Modalities; Integrated dry needling; Therapeutic activities     Subjective Comments:  pt reports she was very sore for several days after last session. pt reports she did alot of  her stretches at home to assist with discomfort.   Initial:  /does not rate, soreness 10Post Session:        /dow snot erate, [id number]  Medications Last Reviewed:  2022  Updated Objective Findings:  None today  Treatment     THERAPEUTIC EXERCISE: ( 15 minutes):    Exercises per grid below to improve mobility, strength, balance, and coordination. Required minimal visual, verbal, manual and tactile cues to promote proper body mechanics. Progressed resistance and repetitions as indicated. Date:  6/2/22 Date:  6/13/22 Date:  6/15/22 Date:  6/20/22 Date:  6/27/22 Date:  6/29/22 Date:  7/6/22   Activity/Exercise Parameters Parameters Parameters       education Diagnosis, prognosis, POC, HEP, anatomy/physiology of condition   Education on importance of gentle movement and walking to tolerance to maintain mobility and decrease tightness after fall and reduce irritability        Open book 10x 2 min left/right        Cat/camel <-> childspose 10x 2 min 3 min       LTR 10x 2 min        L stretch 10x 2 min  2 min 2 min 2 min 2 min   NuStep  8 min, lvl 3, 608 steps        Seated forward flexion/extension  10x        Sun salutation  5x 10x 15 x      Treadmill   2 - 2.5 mph, 17 calories, 6 min  2.5 mph, 17 calories, 8 min 2.5 mph, incline 1, 31 calories, 8 min 3.0 mph, incline 1, 40 calories, 8 min 3.0 mph, incline 1, 40 calories, 8 min   Marching   Alternating  3 x 10 reps  15 lb band  3 x 10 reps     Bent Over Rows    8 lb, 23. 5 in height  Left/right  3 x 5 reps      Lat Pull    20 lb standing  3 x 8 reps      Planks      38 in  4 x 30 sec    RDL Corrective       Red band  2 x 10 reps       THERAPEUTIC ACTIVITY: ( 25 minutes): Therapeutic activities per grid below to improve mobility, strength, coordination, and dynamic movement of entire body to improve functional lifting, carrying, reaching, catching, and overhead activites. Required minimal visual, verbal, manual and tactile cues to promote coordination of bilateral, upper extremity(s), lower extremity(s) and promote motor control of bilateral, upper extremity(s), lower extremity(s).    Date:  6/13/22 Date:  6/15/22 Date:  6/20/22 Date:  6/27/22 Date:  6/29/22 Date:  7/6/22   Activity/Exercise Parameters Parameters Parameters      Dat walks [facility] band  3 x 12 ft Teal band  3 x 12 ft       Side steps Teal band  3 x 12 ft Teal band  3 x 12 ft       Sit to stand  5 lb  3 x 5 reps 10 lb, 19 in   3 x 8 reps      [employer]   10 lb, 3 x 150 ft 10 lb, 3 x 150 ft     Step up    6 in  2 x 10 reps     Rack pull    45 lb  3 x 3 treps 45 lb  3 x 5 reps 45 lb  3 x 5 reps   Circuit     1. Sit to stand 10 lb, 18 in x 5 reps  2. Sled x 50 lb  3. Cunha carry 10 lb x 150 ft  3 rounds 1. Sit to stand 10 lb, 18 in x 5 reps  2. Sled x 50 lb  3. Cunha carry 10 lb x 150 ft  3 rounds         HEP Log Date   1.  cat/camel, L stretch, LTR, childsponse, open book 6/2/22   2.     3.    4.     5.            Treatment/Session Summary:      Treatment Assessment:    pt responds well to RDL corrective with band to improve lat engagement and assist with bar path. pt volume and reps remains the same with circuit however add 30 sec rest between activities due to pt irritability after last session. Communication/Consultation:       None today   Equipment provided today:  HEP   Recommendations/Intent for next  treatment session:  Next visit will focus on strengthening, improving mobility, pain science.          Total Treatment Billable Duration:  40 minutes   Time In: 1430  Time Out: 0508    Jack Grayson, PT       Charge Capture  }Post Session Pain  MedBridge Portal  MD Guidelines  Scanned Media  Benefits  MyChart    Future Appointments   Date Time Provider Bryan Garciai   7/8/2022 12:15 PM Jack Grayson, PT [facility]   7/11/2022  2:30 PM Jack Grayson, PT [facility]   7/13/2022  2:30 PM Jack Milder, PT [facility]   2/1/2023  8:15 AM Cari Vee MD [facility]

## 2022-07-08 ENCOUNTER — HOSPITAL ENCOUNTER (OUTPATIENT)
Dept: PHYSICAL THERAPY | Age: 77
Setting detail: RECURRING SERIES
Discharge: HOME | End: 2022-07-11
Payer: MEDICARE

## 2022-07-08 PROCEDURE — 97110 THERAPEUTIC EXERCISES: CPT

## 2022-07-08 PROCEDURE — 97530 THERAPEUTIC ACTIVITIES: CPT

## 2022-07-08 NOTE — PROGRESS NOTES
Nori Painter  : 1945  Primary: Medicare Part A And B  Secondary: [address] @ [address]  [address] [facility] [address]  Phone: [phone]  Fax: [phone] Plan Frequency: 2    Plan of Care/Certification Expiration Date: 22      PT Visit Info: Total # of Visits to Date: 6      OUTPATIENT PHYSICAL THERAPY:OP NOTE TYPE: Treatment Note 2022       Episode  }Appt Desk              Treatment Diagnosis:   Low back pain (M54.5)  Lumbago with Sciatica Left side (M54.42)  Muscle Weakness, Generalized (M62.81)  Muscle spasm of back (M62.830)  Pain in left hip (M25.552)    Medical/Referring Diagnosis:  Low back pain, unspecified [M54.50]  Referring Physician:  Sidney Paige MD MD Orders:  PT Eval and Treat   Date of Onset:  Onset Date: 22      Allergies:   Sulfa antibiotics  Restrictions/Precautions:  Restrictions/Precautions: None  No data recorded   Interventions Planned (Treatment may consist of any combination of the following):    Current Treatment Recommendations: Strengthening; ROM; Balance training; Functional mobility training; Transfer training; IADL training; Endurance training; Pain management; Manual Therapy - Joint Manipulation; Manual Therapy - Soft Tissue Mobilization; Return to work related activity; Home exercise program; Safety education & training; Patient/Caregiver education & training; Equipment evaluation, education, & procurement; Modalities; Integrated dry needling; Therapeutic activities     Subjective Comments:     Initial:  /does not rate, soreness 10Post Session:        /dow snot erate, [id number]  Medications Last Reviewed:  2022  Updated Objective Findings:  None today  Treatment     THERAPEUTIC EXERCISE: ( 15 minutes):    Exercises per grid below to improve mobility, strength, balance, and coordination.   Required minimal visual, verbal, manual and tactile cues to promote proper body mechanics. Progressed resistance and repetitions as indicated. Date:  6/2/22 Date:  6/13/22 Date:  6/15/22 Date:  6/20/22 Date:  6/27/22 Date:  6/29/22 Date:  7/6/22 Date:  7/8/22   Activity/Exercise Parameters Parameters Parameters        education Diagnosis, prognosis, POC, HEP, anatomy/physiology of condition   Education on importance of gentle movement and walking to tolerance to maintain mobility and decrease tightness after fall and reduce irritability         Open book 10x 2 min left/right         Cat/camel <-> childspose 10x 2 min 3 min        LTR 10x 2 min         L stretch 10x 2 min  2 min 2 min 2 min 2 min    NuStep  8 min, lvl 3, 608 steps         Seated forward flexion/extension  10x         Sun salutation  5x 10x 15 x       Treadmill   2 - 2.5 mph, 17 calories, 6 min  2.5 mph, 17 calories, 8 min 2.5 mph, incline 1, 31 calories, 8 min 3.0 mph, incline 1, 40 calories, 8 min 3.0 mph, incline 1, 40 calories, 8 min 3.0 mph, incline 1, 40 calories, 8 min   Marching   Alternating  3 x 10 reps  15 lb band  3 x 10 reps      Bent Over Rows    8 lb, 23. 5 in height  Left/right  3 x 5 reps       Lat Pull    20 lb standing  3 x 8 reps       Planks      38 in  4 x 30 sec     RDL Corrective       Red band  2 x 10 reps    Lat pull        Standing  20 lb x 8 res  23 lb 2 x 8 reps       THERAPEUTIC ACTIVITY: ( 23 minutes): Therapeutic activities per grid below to improve mobility, strength, coordination, and dynamic movement of entire body to improve functional lifting, carrying, reaching, catching, and overhead activites. Required minimal visual, verbal, manual and tactile cues to promote coordination of bilateral, upper extremity(s), lower extremity(s) and promote motor control of bilateral, upper extremity(s), lower extremity(s).    Date:  6/20/22 Date:  6/27/22 Date:  6/29/22 Date:  7/6/22 Date:  7/8/22   Activity/Exercise Parameters       Sled     60 lb  3 x 30 ft   Sit to stand 10 lb, 19 in   3 x 8 reps Cunha Carry 10 lb, 3 x 150 ft 10 lb, 3 x 150 ft      Step up  6 in  2 x 10 reps      Rack pull  45 lb  3 x 3 treps 45 lb  3 x 5 reps 45 lb  3 x 5 reps 45 lb x 5 reps  50 lb 3 x 5 reps   Circuit   1. Sit to stand 10 lb, 18 in x 5 reps  2. Sled x 50 lb  3. Cunha carry 10 lb x 150 ft  3 rounds 1. Sit to stand 10 lb, 18 in x 5 reps  2. Sled x 50 lb  3. Cunha carry 10 lb x 150 ft  3 rounds    OH Press     15 lb  3 x 3 reps         HEP Log Date   1.  cat/camel, L stretch, LTR, childsponse, open book 6/2/22   2.     3.    4.     5.            Treatment/Session Summary:      Treatment Assessment:    pt progressed to overhead press today - cues required to [facility] FLY core engagement to prevent excessive lumbar extension. pt with carry over of rack pull technique today with infrequent cues for hip hinge. Next session decrease height of rack pull. Communication/Consultation:       None today   Equipment provided today:  HEP   Recommendations/Intent for next  treatment session:  Next visit will focus on strengthening, improving mobility, pain science.          Total Treatment Billable Duration: 38 minutes   Time In: 6626  Time Out: 1300    Sulma Mtz, PT       Charge Capture  }Post Session Pain  MedBridge Portal  MD Guidelines  Scanned Media  Benefits  MyChart    Future Appointments   Date Time Provider Bryan Kamaar   7/11/2022  2:30 PM Bel Ernst, PT [facility]   7/13/2022  2:30 PM Bel Ernst PT [facility]   2/1/2023  8:15 AM Maryann Jasso MD [facility]

## 2022-07-11 ENCOUNTER — HOSPITAL ENCOUNTER (OUTPATIENT)
Dept: PHYSICAL THERAPY | Age: 77
Setting detail: RECURRING SERIES
Discharge: HOME | End: 2022-07-14
Payer: MEDICARE

## 2022-07-11 PROCEDURE — 97110 THERAPEUTIC EXERCISES: CPT

## 2022-07-11 PROCEDURE — 97530 THERAPEUTIC ACTIVITIES: CPT

## 2022-07-11 NOTE — PROGRESS NOTES
Aleksandra Nash  : 1945  Primary: Medicare Part A And B  Secondary: [address] @ [facility]  4 [facility] [address]  Phone: [phone]  Fax: [phone] Plan Frequency: 2    Plan of Care/Certification Expiration Date: 22      PT Visit Info: Total # of Visits to Date: 7      OUTPATIENT PHYSICAL THERAPY:OP NOTE TYPE: Treatment Note 2022       Episode  }Appt Desk              Treatment Diagnosis:   Low back pain (M54.5)  Lumbago with Sciatica Left side (M54.42)  Muscle Weakness, Generalized (M62.81)  Muscle spasm of back (M62.830)  Pain in left hip (M25.552)    Medical/Referring Diagnosis:  Low back pain, unspecified [M54.50]  Referring Physician:  Magno Will MD MD Orders:  PT Eval and Treat   Date of Onset:  Onset Date: 22      Allergies:   Sulfa antibiotics  Restrictions/Precautions:  Restrictions/Precautions: None  No data recorded   Interventions Planned (Treatment may consist of any combination of the following):    Current Treatment Recommendations: Strengthening; ROM; Balance training; Functional mobility training; Transfer training; IADL training; Endurance training; Pain management; Manual Therapy - Joint Manipulation; Manual Therapy - Soft Tissue Mobilization; Return to work related activity; Home exercise program; Safety education & training; Patient/Caregiver education & training; Equipment evaluation, education, & procurement; Modalities; Integrated dry needling; Therapeutic activities     Subjective Comments:  pt with no specific reports changes. Initial:  /does not rate, soreness 10Post Session:        /dow snot erate, [id number]  Medications Last Reviewed:  2022  Updated Objective Findings:  None today  Treatment     THERAPEUTIC EXERCISE: ( 23 minutes):    Exercises per grid below to improve mobility, strength, balance, and coordination.   Required minimal visual, verbal, manual and tactile cues to promote proper body mechanics. Progressed resistance and repetitions as indicated. Date:  6/27/22 Date:  6/29/22 Date:  7/6/22 Date:  7/8/22 Date:  7/11/22   Activity/Exercise        education        Cat/camel <-> childspose        L stretch 2 min 2 min 2 min  2 min   Sun salutation        Treadmill 2.5 mph, incline 1, 31 calories, 8 min 3.0 mph, incline 1, 40 calories, 8 min 3.0 mph, incline 1, 40 calories, 8 min 3.0 mph, incline 1, 40 calories, 8 min 3.0 mph, incline 1, 40 calories, 8 min   Marching 15 lb band  3 x 10 reps       Bent Over Rows        Rows     23 lb  3 x 8 reps   Planks  38 in  4 x 30 sec      RDL Corrective   Red band  2 x 10 reps     Lat pull    Standing  20 lb x 8 res  23 lb 2 x 8 reps Seated  30 lb  3 x 8 reps       THERAPEUTIC ACTIVITY: ( 15 minutes): Therapeutic activities per grid below to improve mobility, strength, coordination, and dynamic movement of entire body to improve functional lifting, carrying, reaching, catching, and overhead activites. Required minimal visual, verbal, manual and tactile cues to promote coordination of bilateral, upper extremity(s), lower extremity(s) and promote motor control of bilateral, upper extremity(s), lower extremity(s). Date:  6/20/22 Date:  6/27/22 Date:  6/29/22 Date:  7/6/22 Date:  7/8/22 Date:  7/11/22   Activity/Exercise Parameters        Sled     60 lb  3 x 30 ft    Sit to stand 10 lb, 19 in   3 x 8 reps        [employer] 10 lb, 3 x 150 ft 10 lb, 3 x 150 ft       Step up  6 in  2 x 10 reps       Rack pull  45 lb  3 x 3 treps 45 lb  3 x 5 reps 45 lb  3 x 5 reps 45 lb x 5 reps  50 lb 3 x 5 reps 45 lb x 8 reps 22 in   45 lb 3 x 5 reps from 15. 5 in boxs   Circuit   1. Sit to stand 10 lb, 18 in x 5 reps  2. Sled x 50 lb  3. Cunha carry 10 lb x 150 ft  3 rounds 1. Sit to stand 10 lb, 18 in x 5 reps  2. Sled x 50 lb  3. Cunha carry 10 lb x 150 ft  3 rounds     OH Press     15 lb  3 x 3 reps          HEP Log Date   1.

## 2022-07-13 ENCOUNTER — HOSPITAL ENCOUNTER (OUTPATIENT)
Dept: PHYSICAL THERAPY | Age: 77
Setting detail: RECURRING SERIES
Discharge: HOME | End: 2022-07-16
Payer: MEDICARE

## 2022-07-13 PROCEDURE — 97110 THERAPEUTIC EXERCISES: CPT

## 2022-07-13 NOTE — PROGRESS NOTES
Yonny Larsen  : 1945  Primary: Medicare Part A And B  Secondary: [address] [place]  [address] [place] [address]  Phone: [phone]  Fax: [phone] Plan Frequency: 2    Plan of Care/Certification Expiration Date: 22      PT Visit Info: Total # of Visits to Date: 8      OUTPATIENT PHYSICAL THERAPY:OP NOTE TYPE: Treatment Note 2022       Episode  }Appt Desk              Treatment Diagnosis:   Low back pain (M54.5)  Lumbago with Sciatica Left side (M54.42)  Muscle Weakness, Generalized (M62.81)  Muscle spasm of back (M62.830)  Pain in left hip (M25.552)    Medical/Referring Diagnosis:  Low back pain, unspecified [M54.50]  Referring Physician:  Jey Ryan MD MD Orders:  PT Eval and Treat   Date of Onset:  Onset Date: 22      Allergies:   Sulfa antibiotics  Restrictions/Precautions:  Restrictions/Precautions: None  No data recorded   Interventions Planned (Treatment may consist of any combination of the following):    Current Treatment Recommendations: Strengthening; ROM; Balance training; Functional mobility training; Transfer training; IADL training; Endurance training; Pain management; Manual Therapy - Joint Manipulation; Manual Therapy - Soft Tissue Mobilization; Return to work related activity; Home exercise program; Safety education & training; Patient/Caregiver education & training; Equipment evaluation, education, & procurement; Modalities; Integrated dry needling; Therapeutic activities     Subjective Comments:  pt reports she just feels off and jittery today. Initial:  /does not rate, soreness 10Post Session:        /dow snot erate, [id number]  Medications Last Reviewed:  2022  Updated Objective Findings:  None today  Treatment     THERAPEUTIC EXERCISE: ( 30 minutes):    Exercises per grid below to improve mobility, strength, balance, and coordination.   Required minimal visual, verbal, manual and tactile cues to promote proper body mechanics. Progressed resistance and repetitions as indicated. Date:  6/27/22 Date:  6/29/22 Date:  7/6/22 Date:  7/8/22 Date:  7/11/22 Date:  7/13/22   Activity/Exercise         education      Assessment of BP before during and after exercise. Education on BP and safe ranges   Cat/camel <-> childspose         L stretch 2 min 2 min 2 min  2 min    Treadmill 2.5 mph, incline 1, 31 calories, 8 min 3.0 mph, incline 1, 40 calories, 8 min 3.0 mph, incline 1, 40 calories, 8 min 3.0 mph, incline 1, 40 calories, 8 min 3.0 mph, incline 1, 40 calories, 8 min 2.5 to 3.0 mph x 6 min   Marching 15 lb band  3 x 10 reps        Bent Over Rows         Rows     23 lb  3 x 8 reps 23 lb  3 x 8 reps   Planks  38 in  4 x 30 sec       RDL Corrective   Red band  2 x 10 reps      Lat pull    Standing  20 lb x 8 res  23 lb 2 x 8 reps Seated  30 lb  3 x 8 reps        THERAPEUTIC ACTIVITY: ( 0 minutes): Therapeutic activities per grid below to improve mobility, strength, coordination, and dynamic movement of entire body to improve functional lifting, carrying, reaching, catching, and overhead activites. Required minimal visual, verbal, manual and tactile cues to promote coordination of bilateral, upper extremity(s), lower extremity(s) and promote motor control of bilateral, upper extremity(s), lower extremity(s). Date:  6/20/22 Date:  6/27/22 Date:  6/29/22 Date:  7/6/22 Date:  7/8/22 Date:  7/11/22   Activity/Exercise Parameters        Sled     60 lb  3 x 30 ft    Sit to stand 10 lb, 19 in   3 x 8 reps        [employer] 10 lb, 3 x 150 ft 10 lb, 3 x 150 ft       Step up  6 in  2 x 10 reps       Rack pull  45 lb  3 x 3 treps 45 lb  3 x 5 reps 45 lb  3 x 5 reps 45 lb x 5 reps  50 lb 3 x 5 reps 45 lb x 8 reps 22 in   45 lb 3 x 5 reps from 15. 5 in boxs   Circuit   1. Sit to stand 10 lb, 18 in x 5 reps  2. Sled x 50 lb  3. Cunha carry 10 lb x 150 ft  3 rounds 1.  Sit to stand 10 lb, 18 in x 5 reps  2. Sled x 50 lb  3. Cunha carry 10 lb x 150 ft  3 rounds     OH Press     15 lb  3 x 3 reps          HEP Log Date   1.  cat/camel, L stretch, LTR, childsponse, open book 6/2/22   2.     3.    4.     5.            Treatment/Session Summary:      Treatment Assessment:    pt assessed BP at start of session today due to reports of feeling off. Pt /132 mmHg, 78 BPM. Pt placed in a quiet room x 5 min in supine position with BP reasessd at 142/79 mmHg, 81 bpm. After UE strengthenign and walking /92 mmHGpt assessed BP at start of session today due to reports of feeling off. Pt /132 mmHg, 78 BPM. Pt placed in a quiet room x 5 min in supine position with BP reasessd at 142/79 mmHg, 81 bpm. After UE strengthenign and walking /92 mmHG   Communication/Consultation:       None today   Equipment provided today:  HEP   Recommendations/Intent for next  treatment session:  Next visit will focus on strengthening, improving mobility, pain science.          Total Treatment Billable Duration: 30 minutes 15 min untimed  Time In: 1430  Time Out: 1515    Thao Durand, PT       Charge Capture  }Post Session Pain  MedBridge Portal  MD Guidelines  Scanned Media  Benefits  MyChart    Future Appointments   Date Time Provider [place] Kamara   7/18/2022  2:30 PM Thao Durand, PT [facility]   7/20/2022  2:30 PM Thao Durand, PT [facility]   8/1/2022  2:30 PM Sulma Ervin, PT [facility]   8/3/2022  2:30 PM Sulma Ervin, PT [facility]   8/8/2022  2:30 PM Thao Durand, PT [facility]   8/10/2022  2:30 PM Thao Durand, PT [facility]   8/15/2022  2:30 PM Thao Durand, PT [facility]   8/17/2022  2:30 PM Sulma Fernando, PT [facility]   2/1/2023  8:15 AM Jayde Redd MD [facility]

## 2022-07-14 RX ORDER — DULOXETIN HYDROCHLORIDE 60 MG/1
CAPSULE, DELAYED RELEASE ORAL
Qty: 90 CAPSULE | Refills: 0 | OUTPATIENT
Start: 2022-07-14

## 2022-07-18 ENCOUNTER — HOSPITAL ENCOUNTER (OUTPATIENT)
Dept: PHYSICAL THERAPY | Age: 77
Setting detail: RECURRING SERIES
Discharge: HOME | End: 2022-07-21
Payer: MEDICARE

## 2022-07-18 PROCEDURE — 97110 THERAPEUTIC EXERCISES: CPT

## 2022-07-18 PROCEDURE — 97530 THERAPEUTIC ACTIVITIES: CPT

## 2022-07-18 NOTE — PROGRESS NOTES
strength, balance, and coordination. Required minimal visual, verbal, manual and tactile cues to promote proper body mechanics. Progressed resistance and repetitions as indicated. Date:  7/6/22 Date:  7/8/22 Date:  7/11/22 Date:  7/13/22 Date:  7/18/22   Activity/Exercise        education    Assessment of BP before during and after exercise. Education on BP and safe ranges Assessment of progress toward goals, admin and scoring of Oswestry - see progress report for further details. L stretch 2 min  2 min  2 min   Treadmill 3.0 mph, incline 1, 40 calories, 8 min 3.0 mph, incline 1, 40 calories, 8 min 3.0 mph, incline 1, 40 calories, 8 min 2.5 to 3.0 mph x 6 min 10 min, 0.46 miles, 2% incline   Rows   23 lb  3 x 8 reps 23 lb  3 x 8 reps    Planks        RDL Corrective Red band  2 x 10 reps       Lat pull  Standing  20 lb x 8 res  23 lb 2 x 8 reps Seated  30 lb  3 x 8 reps         THERAPEUTIC ACTIVITY: ( 23minutes): Therapeutic activities per grid below to improve mobility, strength, coordination, and dynamic movement of entire body to improve functional lifting, carrying, reaching, catching, and overhead activites. Required minimal visual, verbal, manual and tactile cues to promote coordination of bilateral, upper extremity(s), lower extremity(s) and promote motor control of bilateral, upper extremity(s), lower extremity(s). Date:  6/20/22 Date:  6/27/22 Date:  6/29/22 Date:  7/6/22 Date:  7/8/22 Date:  7/11/22 Date:  7/18/22   Activity/Exercise Parameters         Sled     60 lb  3 x 30 ft  75 lb  3 x 30 ft   Sit to stand 10 lb, 19 in   3 x 8 reps         [employer] 10 lb, 3 x 150 ft 10 lb, 3 x 150 ft     15 lb  3 x 150ft   Step up  6 in  2 x 10 reps        Rack pull  45 lb  3 x 3 treps 45 lb  3 x 5 reps 45 lb  3 x 5 reps 45 lb x 5 reps  50 lb 3 x 5 reps 45 lb x 8 reps 22 in   45 lb 3 x 5 reps from 15. 5 in boxs 45 lb x 8 reps 22 in   45 lb 3 x 5 reps from 15. 5 in boxs   Circuit   1.  Sit to stand 10 lb, 18 in x 5 reps  2. Sled x 50 lb  3. Cunha carry 10 lb x 150 ft  3 rounds 1. Sit to stand 10 lb, 18 in x 5 reps  2. Sled x 50 lb  3. Cunha carry 10 lb x 150 ft  3 rounds      OH Press     15 lb  3 x 3 reps           HEP Log Date    cat/camel, L stretch, LTR, childsponse, open book 6/2/22   2.     3.    4.     5.            Treatment/Session Summary:      Treatment Assessment:    see progress report for details   Communication/Consultation:       None today   Equipment provided today:  HEP   Recommendations/Intent for next  treatment session:  Next visit will focus on strengthening, improving mobility, pain science.          Total Treatment Billable Duration: 38 minutes 7 min untimed  Time In: 1430  Time Out: 1515    Ramón Sloan, PT       Charge Capture  }Post Session Pain  MedBridge Portal  MD Guidelines  Scanned Media  Benefits  MyChart    Future Appointments   Date Time Provider [place]   7/20/2022  2:30 PM Daphine Carmen, PT [facility]   7/21/2022  2:15 PM Cody Núñez MD [facility]   8/1/2022  2:30 PM Sulma Fernando, PT [facility]   8/3/2022  2:30 PM Sulma Dayanna Hodges, PT [facility]   8/8/2022  2:30 PM Daphine Carmen, PT [facility]   8/10/2022  2:30 PM Daphine Carmen, PT [facility]   8/15/2022  2:30 PM Daphine Carmen, PT [facility]   8/17/2022  2:30 PM Daphine Carmen, PT [facility]   2/1/2023  8:15 AM Cody Núñez MD [facility]

## 2022-07-19 ENCOUNTER — APPOINTMENT (OUTPATIENT)
Dept: PHYSICAL THERAPY | Age: 77
End: 2022-07-19
Payer: MEDICARE

## 2022-07-20 ENCOUNTER — HOSPITAL ENCOUNTER (OUTPATIENT)
Dept: PHYSICAL THERAPY | Age: 77
Setting detail: RECURRING SERIES
Discharge: HOME | End: 2022-07-23
Payer: MEDICARE

## 2022-07-20 PROCEDURE — 97110 THERAPEUTIC EXERCISES: CPT

## 2022-07-20 NOTE — PROGRESS NOTES
Anastasia Escobar  : 1945  Primary: Medicare Part A And B  Secondary: [address] @ [facility]  [address] [facility] [place] [address]  Phone: [phone]  Fax: [phone] Plan Frequency: 2    Plan of Care/Certification Expiration Date: 22      PT Visit Info: Total # of Visits to Date: 10      OUTPATIENT PHYSICAL THERAPY:OP NOTE TYPE: Treatment Note 2022       Episode  }Appt Desk              Treatment Diagnosis:   Low back pain (M54.5)  Lumbago with Sciatica Left side (M54.42)  Muscle Weakness, Generalized (M62.81)  Muscle spasm of back (M62.830)  Pain in left hip (M25.552)    Medical/Referring Diagnosis:  Lumbago with sciatica, unspecified side [M54.40]  Referring Physician:  Pallavi Castañeda MD MD Orders:  PT Eval and Treat   Date of Onset:  Onset Date: 22      Allergies:   Sulfa antibiotics  Restrictions/Precautions:  Restrictions/Precautions: None  No data recorded   Interventions Planned (Treatment may consist of any combination of the following):    Current Treatment Recommendations: Strengthening; ROM; Balance training; Functional mobility training; Transfer training; IADL training; Endurance training; Pain management; Manual Therapy - Joint Manipulation; Manual Therapy - Soft Tissue Mobilization; Return to work related activity; Home exercise program; Safety education & training; Patient/Caregiver education & training; Equipment evaluation, education, & procurement; Modalities; Integrated dry needling; Therapeutic activities     Subjective Comments:  pt reports that she has had L LE radicular pain since last night that awoke her from sleep. pt reports the burning/aching sensation radiates to the.   Initial:  /does not rate, soreness 10Post Session:        /dow snot erate, [id number]  Medications Last Reviewed:  2022  Updated Objective Findings:  None today  Treatment     THERAPEUTIC EXERCISE: ( 38 minutes): Exercises per grid below to improve mobility, strength, balance, and coordination. Required minimal visual, verbal, manual and tactile cues to promote proper body mechanics. Progressed resistance and repetitions as indicated. Date:  7/6/22 Date:  7/8/22 Date:  7/11/22 Date:  7/13/22 Date:  7/18/22 Date:  7/20/22   Activity/Exercise         education    Assessment of BP before during and after exercise. Education on BP and safe ranges Assessment of progress toward goals, admin and scoring of Oswestry - see progress report for further details. L stretch 2 min  2 min  2 min 2 min   Treadmill 3.0 mph, incline 1, 40 calories, 8 min 3.0 mph, incline 1, 40 calories, 8 min 3.0 mph, incline 1, 40 calories, 8 min 2.5 to 3.0 mph x 6 min 10 min, 0.46 miles, 2% incline 5 min with pt reports an increase in radicular pain intensity with walking   Rows   23 lb  3 x 8 reps 23 lb  3 x 8 reps     RDL Corrective Red band  2 x 10 reps        Lat pull  Standing  20 lb x 8 res  23 lb 2 x 8 reps Seated  30 lb  3 x 8 reps      Open book      1:30 min  Left and right with holds   Lower Trunk rotation      30x with holds   Childspose to Quad rocking      30x   Cat/Cow      30 x       THERAPEUTIC ACTIVITY: ( 0 minutes): Therapeutic activities per grid below to improve mobility, strength, coordination, and dynamic movement of entire body to improve functional lifting, carrying, reaching, catching, and overhead activites. Required minimal visual, verbal, manual and tactile cues to promote coordination of bilateral, upper extremity(s), lower extremity(s) and promote motor control of bilateral, upper extremity(s), lower extremity(s).    Date:  6/20/22 Date:  6/27/22 Date:  6/29/22 Date:  7/6/22 Date:  7/8/22 Date:  7/11/22 Date:  7/18/22   Activity/Exercise Parameters         Sled     60 lb  3 x 30 ft  75 lb  3 x 30 ft   Sit to stand 10 lb, 19 in   3 x 8 reps         [employer] 10 lb, 3 x 150 ft 10 lb, 3 x 150 ft     15 lb  3 x 150ft   Step up  6 in  2 x 10 reps        Rack pull  45 lb  3 x 3 treps 45 lb  3 x 5 reps 45 lb  3 x 5 reps 45 lb x 5 reps  50 lb 3 x 5 reps 45 lb x 8 reps 22 in   45 lb 3 x 5 reps from 15. 5 in boxs 45 lb x 8 reps 22 in   45 lb 3 x 5 reps from 15. 5 in boxs   Circuit   1. Sit to stand 10 lb, 18 in x 5 reps  2. Sled x 50 lb  3. Cunha carry 10 lb x 150 ft  3 rounds 1. Sit to stand 10 lb, 18 in x 5 reps  2. Sled x 50 lb  3. Cunha carry 10 lb x 150 ft  3 rounds      OH Press     15 lb  3 x 3 reps           HEP Log Date    cat/camel, L stretch, LTR, childsponse, open book 6/2/22   2.     3.    4.     5.            Treatment/Session Summary:      Treatment Assessment:    pt reports that radicular symptoms completely centralize with flexion and rotation based movement patterns with only residual aching in anterior hip and low back. pt radicular symptoms return after walking approx 100 ft and standing for approx 2 min. Therapist encourages pt to continue with walkng in short bouts and use of HEP stretches to assist with pain management. Communication/Consultation:       None today   Equipment provided today:  HEP   Recommendations/Intent for next  treatment session:  Next visit will focus on strengthening, improving mobility, pain science.          Total Treatment Billable Duration: 38 minutes 2 min untimed  Time In: 1440  Time Out: 1520    Sulma Fernando, PT       Charge Capture  }Post Session Pain  MedBridge Portal  MD Guidelines  Scanned Media  Benefits  MyChart    Future Appointments   Date Time Provider Bryan Kamara   7/21/2022  2:15 PM Velasquez Vieira MD [facility]   8/1/2022  2:30 PM Sulma Fernando, PT [facility]   8/3/2022  2:30 PM Nikolai Jones, PT [facility]   8/8/2022  2:30 PM Lane Robert, PT [facility]   8/10/2022  2:30 PM Lane Robert, PT [facility]   8/15/2022  2:30 PM Sulma Fernando, PT [facility]   8/17/2022  2:30 PM Sulma Fernando, PT [facility] 2/1/2023  8:15 AM Celso Bailon MD [facility]

## 2022-07-21 ENCOUNTER — OFFICE VISIT (OUTPATIENT)
Dept: FAMILY MEDICINE CLINIC | Facility: CLINIC | Age: 77
End: 2022-07-21
Payer: MEDICARE

## 2022-07-21 VITALS
BODY MASS INDEX: 27.16 KG/M2 | OXYGEN SATURATION: 98 % | HEART RATE: 75 BPM | TEMPERATURE: 97 F | HEIGHT: 66 IN | WEIGHT: 169 LBS | DIASTOLIC BLOOD PRESSURE: 80 MMHG | SYSTOLIC BLOOD PRESSURE: 150 MMHG

## 2022-07-21 DIAGNOSIS — M54.40 LOW BACK PAIN WITH SCIATICA, SCIATICA LATERALITY UNSPECIFIED, UNSPECIFIED BACK PAIN LATERALITY, UNSPECIFIED CHRONICITY: ICD-10-CM

## 2022-07-21 DIAGNOSIS — I10 PRIMARY HYPERTENSION: Primary | ICD-10-CM

## 2022-07-21 DIAGNOSIS — F32.9 REACTIVE DEPRESSION: ICD-10-CM

## 2022-07-21 DIAGNOSIS — H92.02 LEFT EAR PAIN: ICD-10-CM

## 2022-07-21 PROCEDURE — G8417 CALC BMI ABV UP PARAM F/U: HCPCS | Performed by: FAMILY MEDICINE

## 2022-07-21 PROCEDURE — 1123F ACP DISCUSS/DSCN MKR DOCD: CPT | Performed by: FAMILY MEDICINE

## 2022-07-21 PROCEDURE — 99214 OFFICE O/P EST MOD 30 MIN: CPT | Performed by: FAMILY MEDICINE

## 2022-07-21 PROCEDURE — 4004F PT TOBACCO SCREEN RCVD TLK: CPT | Performed by: FAMILY MEDICINE

## 2022-07-21 PROCEDURE — G8427 DOCREV CUR MEDS BY ELIG CLIN: HCPCS | Performed by: FAMILY MEDICINE

## 2022-07-21 PROCEDURE — 3017F COLORECTAL CA SCREEN DOC REV: CPT | Performed by: FAMILY MEDICINE

## 2022-07-21 PROCEDURE — 1090F PRES/ABSN URINE INCON ASSESS: CPT | Performed by: FAMILY MEDICINE

## 2022-07-21 PROCEDURE — G8400 PT W/DXA NO RESULTS DOC: HCPCS | Performed by: FAMILY MEDICINE

## 2022-07-21 RX ORDER — VALSARTAN 160 MG/1
160 TABLET ORAL DAILY
Qty: 90 TABLET | Refills: 3 | Status: SHIPPED | OUTPATIENT
Start: 2022-07-21 | End: 2022-09-28 | Stop reason: SDUPTHER

## 2022-07-21 ASSESSMENT — ENCOUNTER SYMPTOMS
ABDOMINAL PAIN: 0
DIARRHEA: 0
SHORTNESS OF BREATH: 0
BACK PAIN: 1
CONSTIPATION: 0
COUGH: 0
SINUS PAIN: 0
EYE DISCHARGE: 0
CHEST TIGHTNESS: 0

## 2022-07-21 ASSESSMENT — PATIENT HEALTH QUESTIONNAIRE - PHQ9
1. LITTLE INTEREST OR PLEASURE IN DOING THINGS: 0
2. FEELING DOWN, DEPRESSED OR HOPELESS: 1
SUM OF ALL RESPONSES TO PHQ QUESTIONS 1-9: 1
SUM OF ALL RESPONSES TO PHQ9 QUESTIONS 1 & 2: 1
SUM OF ALL RESPONSES TO PHQ QUESTIONS 1-9: 1

## 2022-07-21 NOTE — PROGRESS NOTES
Martha Linder is a 78 y.o. female who presents with   Chief Complaint   Patient presents with    Follow-up     BP     Otalgia     LEFT EAR        History of Present Illness  Pt with L ear pressure. No colored drainage. Increase stress at home. Overwhelmed at times. Going to PT for low back oain. BP elevated to 170/130 at PT. Came down on recheck, but pt has noticed elevated readings at home. No cp or sob. No edema. No palpitations. Taking Cymbalta and Gabapentin. Review of Systems  Review of Systems   Constitutional:  Negative for appetite change, fatigue and fever. HENT:  Positive for ear pain. Negative for congestion and sinus pain. Eyes:  Negative for discharge. Respiratory:  Negative for cough, chest tightness and shortness of breath. Cardiovascular:  Negative for chest pain, palpitations and leg swelling. Gastrointestinal:  Negative for abdominal pain, constipation and diarrhea. Genitourinary:  Negative for dysuria. Musculoskeletal:  Positive for arthralgias and back pain. Negative for joint swelling. Skin:  Negative for rash. Neurological:  Negative for headaches. Hematological:  Negative for adenopathy. Psychiatric/Behavioral:  Positive for dysphoric mood. The patient is nervous/anxious. Medications  Current Outpatient Medications   Medication Sig Dispense Refill    valsartan (DIOVAN) 160 MG tablet Take 1 tablet by mouth in the morning. 90 tablet 3    atorvastatin (LIPITOR) 10 MG tablet Take 10 mg by mouth daily      clotrimazole-betamethasone (LOTRISONE) 1-0.05 % cream Apply topically 2 times daily      DULoxetine (CYMBALTA) 60 MG extended release capsule Take 60 mg by mouth daily      gabapentin (NEURONTIN) 100 MG capsule Take 200 mg by mouth every 12 hours. omeprazole (PRILOSEC) 40 MG delayed release capsule Take 40 mg by mouth daily       No current facility-administered medications for this visit.         Past Medical History  Past Medical History: Diagnosis Date    Anxiety 3/13/2013    Chest pain, unspecified 3/13/2013    Dyslipidemia 3/13/2013    GERD (gastroesophageal reflux disease) 3/13/2013    Hypercholesterolemia     Unstable angina ([address] 3/13/2013       Surgical History  Past Surgical History:   Procedure Laterality Date    BREAST BIOPSY      2000    COLONOSCOPY  2010    PELVIC LAPAROSCOPY      Infertility          Family History  Family History   Problem Relation Age of Onset    Other Sister         brain anyuerism    Cancer Sister         lung    Breast Cancer Other 46        Niece    Breast Cancer Paternal Aunt 80    Heart Attack Brother         Age 61, stent, vein stripping    Heart Attack Mother         Age 62       Social History  Social History     Socioeconomic History    Marital status:      Spouse name: Not on file    Number of children: Not on file    Years of education: Not on file    Highest education level: Not on file   Occupational History    Not on file   Tobacco Use    Smoking status: Never    Smokeless tobacco: Never   Substance and Sexual Activity    Alcohol use: Yes     Alcohol/week: 10.0 standard drinks    Drug use: No    Sexual activity: Not on file   Other Topics Concern    Not on file   Social History Narrative    Not on file     Social Determinants of Health     Financial Resource Strain: Not on file   Food Insecurity: Not on file   Transportation Needs: Not on file   Physical Activity: Not on file   Stress: Not on file   Social Connections: Not on file   Intimate Partner Violence: Not on file   Housing Stability: Not on file       Social History     Tobacco Use   Smoking Status Never   Smokeless Tobacco Never       Allergies  Allergies   Allergen Reactions    Sulfa Antibiotics Rash       Vital Signs  Body mass index is 27.28 kg/m².   Vitals:    07/21/22 1423 07/21/22 1815   BP: (!) 142/80 (!) 150/80   Pulse: 75    Temp: 97 °F (36.1 °C)    TempSrc: Temporal    SpO2: 98%    Weight: 169 lb (76.7 kg)    Height: 5' 6\" (1.676 m)        Physical Exam  Physical Exam  Vitals reviewed. Constitutional:       Appearance: Normal appearance. HENT:      Head: Normocephalic. Right Ear: Tympanic membrane normal.      Left Ear: Tympanic membrane normal.      Nose: No congestion. Mouth/Throat:      Pharynx: No oropharyngeal exudate or posterior oropharyngeal erythema. Eyes:      Extraocular Movements: Extraocular movements intact. Conjunctiva/sclera: Conjunctivae normal.   Cardiovascular:      Rate and Rhythm: Normal rate and regular rhythm. Heart sounds: Normal heart sounds. No murmur heard. Pulmonary:      Effort: Pulmonary effort is normal.      Breath sounds: Normal breath sounds. No wheezing. Musculoskeletal:         General: Tenderness (L low back and groin) present. Right lower leg: No edema. Left lower leg: No edema. Lymphadenopathy:      Cervical: No cervical adenopathy. Skin:     General: Skin is warm and dry. Findings: No rash. Neurological:      General: No focal deficit present. Mental Status: She is alert. Psychiatric:         Mood and Affect: Mood normal.         Thought Content: Thought content normal.        Assessment and Plan  Marylu Diaz was seen today for follow-up and otalgia. Diagnoses and all orders for this visit:    Primary hypertension  -     valsartan (DIOVAN) 160 MG tablet; Take 1 tablet by mouth in the morning.     Low back pain with sciatica, sciatica laterality unspecified, unspecified back pain laterality, unspecified chronicity    Reactive depression    Left ear pain    Cont PT  Add Diovan  Watch bp closely; low salt diet  Cont Cymbalta- getting legal help with home situation  Labs looked good earlier this year  Ear looks clear- add Flonase  Recheck in 1 mo    On this date I have spent 32 minutes reviewing previous notes, test results and face to face with the patient discussing the diagnosis and importance of compliance with the treatment plan as well as documenting on the day of the visit.

## 2022-08-01 ENCOUNTER — HOSPITAL ENCOUNTER (OUTPATIENT)
Dept: PHYSICAL THERAPY | Age: 77
Setting detail: RECURRING SERIES
Discharge: HOME | End: 2022-08-04
Payer: MEDICARE

## 2022-08-01 PROCEDURE — 97530 THERAPEUTIC ACTIVITIES: CPT

## 2022-08-01 PROCEDURE — 97110 THERAPEUTIC EXERCISES: CPT

## 2022-08-01 NOTE — PROGRESS NOTES
Reyes Garden  : 1945  Primary: Medicare Part A And B  Secondary: [address] @ [facility]  [address] [facility] [address]  Phone: [phone]  Fax: [phone] Plan Frequency: 2    Plan of Care/Certification Expiration Date: 22      PT Visit Info: Total # of Visits to Date: 10      OUTPATIENT PHYSICAL THERAPY:OP NOTE TYPE: Treatment Note 2022       Episode  }Appt Desk              Treatment Diagnosis:   Low back pain (M54.5)  Lumbago with Sciatica Left side (M54.42)  Muscle Weakness, Generalized (M62.81)  Muscle spasm of back (M62.830)  Pain in left hip (M25.552)    Medical/Referring Diagnosis:  Lumbago with sciatica, unspecified side [M54.40]  Referring Physician:  Vaishnavi Edward MD MD Orders:  PT Eval and Treat   Date of Onset:  Onset Date: 22      Allergies:   Sulfa antibiotics  Restrictions/Precautions:  Restrictions/Precautions: None  No data recorded   Interventions Planned (Treatment may consist of any combination of the following):    Current Treatment Recommendations: Strengthening; ROM; Balance training; Functional mobility training; Transfer training; IADL training; Endurance training; Pain management; Manual Therapy - Joint Manipulation; Manual Therapy - Soft Tissue Mobilization; Return to work related activity; Home exercise program; Safety education & training; Patient/Caregiver education & training; Equipment evaluation, education, & procurement; Modalities; Integrated dry needling; Therapeutic activities     Subjective Comments:  pt reports that she played 18 holes of golf today. Initial:  /does not rate, soreness 10Post Session:        /dow snot erate, [email or web address]  Medications Last Reviewed:  2022  Updated Objective Findings:  None today  Treatment     THERAPEUTIC EXERCISE: ( 10 minutes):    Exercises per grid below to improve mobility, strength, balance, and coordination.   Required minimal visual, verbal, manual and tactile cues to promote proper body mechanics. Progressed resistance and repetitions as indicated. Date:  7/6/22 Date:  7/8/22 Date:  7/11/22 Date:  7/13/22 Date:  7/18/22 Date:  7/20/22 Date:  8/1/22   Activity/Exercise          education    Assessment of BP before during and after exercise. Education on BP and safe ranges Assessment of progress toward goals, admin and scoring of Oswestry - see progress report for further details. L stretch 2 min  2 min  2 min 2 min 2 min   Treadmill 3.0 mph, incline 1, 40 calories, 8 min 3.0 mph, incline 1, 40 calories, 8 min 3.0 mph, incline 1, 40 calories, 8 min 2.5 to 3.0 mph x 6 min 10 min, 0.46 miles, 2% incline 5 min with pt reports an increase in radicular pain intensity with walking 8 min, incline 2%, 2.2 mph   Rows   23 lb  3 x 8 reps 23 lb  3 x 8 reps      RDL Corrective Red band  2 x 10 reps         Lat pull  Standing  20 lb x 8 res  23 lb 2 x 8 reps Seated  30 lb  3 x 8 reps       Open book      1:30 min  Left and right with holds    Lower Trunk rotation      30x with holds    Childspose to Quad rocking      30x    Cat/Cow      30 x        THERAPEUTIC ACTIVITY: ( 28 minutes): Therapeutic activities per grid below to improve mobility, strength, coordination, and dynamic movement of entire body to improve functional lifting, carrying, reaching, catching, and overhead activites. Required minimal visual, verbal, manual and tactile cues to promote coordination of bilateral, upper extremity(s), lower extremity(s) and promote motor control of bilateral, upper extremity(s), lower extremity(s).    Date:  7/6/22 Date:  7/8/22 Date:  7/11/22 Date:  7/18/22 Date:  8/1/22   Activity/Exercise        Sled  60 lb  3 x 30 ft  75 lb  3 x 30 ft 75 lb  3 x 30 ft   Sit to stand     Red band, 10 lb, 17 in bench  3 x 5 reps   Cunha Carry    15 lb  3 x 150ft    Rack pull 45 lb  3 x 5 reps 45 lb x 5 reps  50 lb 3 x 5 reps 45 lb x 8 reps 22 in   45 lb 3 x 5 reps from 15. 5 in boxs 45 lb x 8 reps 22 in   45 lb 3 x 5 reps from 15. 5 in boxs 45 lb x 8 reps 22 in   45 lb 3 x 5 reps from 15. 5 in boxs   Circuit 1. Sit to stand 10 lb, 18 in x 5 reps  2. Sled x 50 lb  3. Cunha carry 10 lb x 150 ft  3 rounds       Split squat     12 in block, B UE support  10x left/right   OH Press  15 lb  3 x 3 reps            HEP Log Date    cat/camel, L stretch, LTR, childsponse, open book 6/2/22   2.     3.    4.     5.            Treatment/Session Summary:      Treatment Assessment:    pt demos B hip extension to 15 dg with minimal discomfort on the left at end range. pt is able to carry over technique with deadlift and required only min cues for scapular setting and thoracic extension. pt responds well to use of resistance band to cue hip abd/ER. Communication/Consultation:       None today   Equipment provided today:  HEP   Recommendations/Intent for next  treatment session:  Next visit will focus on strengthening, improving mobility, pain science.          Total Treatment Billable Duration: 38 minutes 2 min untimed  Time In: 1435  Time Out: 1515    Karonmikie Avery, PT       Charge Capture  }Post Session Pain  MedBridge Portal  MD Guidelines  Scanned Media  Benefits  MyChart    Future Appointments   Date Time Provider Bryan Kamara   8/3/2022  2:30 PM Karon Avery, [facility] HOME [facility]   8/8/2022  2:30 PM Karon Avery, PT [facility]   8/10/2022  2:30 PM Karon Avery PT [facility]   8/15/2022  2:30 PM Sulma Fernando, PT [facility]   8/17/2022  9:45 AM [facility] LAB RESOURCE [facility]   8/17/2022  2:30 PM Sulma Fernando, PT [facility]   2/1/2023  8:15 AM Melani Bull MD [facility] AMB

## 2022-08-03 ENCOUNTER — HOSPITAL ENCOUNTER (OUTPATIENT)
Dept: PHYSICAL THERAPY | Age: 77
Setting detail: RECURRING SERIES
Discharge: HOME | End: 2022-08-06
Payer: MEDICARE

## 2022-08-03 PROCEDURE — 97530 THERAPEUTIC ACTIVITIES: CPT

## 2022-08-03 PROCEDURE — 97110 THERAPEUTIC EXERCISES: CPT

## 2022-08-03 NOTE — PROGRESS NOTES
Bijan Ochoa  : 1945  Primary: Medicare Part A And B  Secondary: [address] @ [facility]  [address] [facility] [address]  Phone: [phone]  Fax: [phone] Plan Frequency: 2    Plan of Care/Certification Expiration Date: 22      PT Visit Info: Total # of Visits to Date: 15      OUTPATIENT PHYSICAL THERAPY:OP NOTE TYPE: Treatment Note 8/3/2022       Episode  }Appt Desk              Treatment Diagnosis:   Low back pain (M54.5)  Lumbago with Sciatica Left side (M54.42)  Muscle Weakness, Generalized (M62.81)  Muscle spasm of back (M62.830)  Pain in left hip (M25.552)    Medical/Referring Diagnosis:  Lumbago with sciatica, unspecified side [M54.40]  Referring Physician:  Aixa Denny MD MD Orders:  PT Eval and Treat   Date of Onset:  Onset Date: 22      Allergies:   Sulfa antibiotics  Restrictions/Precautions:  Restrictions/Precautions: None  No data recorded   Interventions Planned (Treatment may consist of any combination of the following):    Current Treatment Recommendations: Strengthening; ROM; Balance training; Functional mobility training; Transfer training; IADL training; Endurance training; Pain management; Manual Therapy - Joint Manipulation; Manual Therapy - Soft Tissue Mobilization; Return to work related activity; Home exercise program; Safety education & training; Patient/Caregiver education & training; Equipment evaluation, education, & procurement; Modalities; Integrated dry needling; Therapeutic activities     Subjective Comments:  pt reports that she was sore after last visit but stretching helped  Initial:  /does not rate, soreness 10Post Session:        /dow snot erate, [id number]  Medications Last Reviewed:  8/3/2022  Updated Objective Findings:  None today  Treatment     THERAPEUTIC EXERCISE: ( 10 minutes):    Exercises per grid below to improve mobility, strength, balance, and coordination. Required minimal visual, verbal, manual and tactile cues to promote proper body mechanics. Progressed resistance and repetitions as indicated. Date:  7/6/22 Date:  7/8/22 Date:  7/11/22 Date:  7/13/22 Date:  7/18/22 Date:  7/20/22 Date:  8/1/22 Date:  8/3/22   Activity/Exercise           education    Assessment of BP before during and after exercise. Education on BP and safe ranges Assessment of progress toward goals, admin and scoring of Oswestry - see progress report for further details. L stretch 2 min  2 min  2 min 2 min 2 min 10 x   Treadmill 3.0 mph, incline 1, 40 calories, 8 min 3.0 mph, incline 1, 40 calories, 8 min 3.0 mph, incline 1, 40 calories, 8 min 2.5 to 3.0 mph x 6 min 10 min, 0.46 miles, 2% incline 5 min with pt reports an increase in radicular pain intensity with walking 8 min, incline 2%, 2.2 mph 8 min, incline 2%, 2.5 mph   Rows   23 lb  3 x 8 reps 23 lb  3 x 8 reps       RDL Corrective Red band  2 x 10 reps          Lat pull  Standing  20 lb x 8 res  23 lb 2 x 8 reps Seated  30 lb  3 x 8 reps        Open book      1:30 min  Left and right with holds     Lower Trunk rotation      30x with holds     Childspose to Quad rocking      30x     Cat/Cow      30 x         THERAPEUTIC ACTIVITY: ( 28 minutes): Therapeutic activities per grid below to improve mobility, strength, coordination, and dynamic movement of entire body to improve functional lifting, carrying, reaching, catching, and overhead activites. Required minimal visual, verbal, manual and tactile cues to promote coordination of bilateral, upper extremity(s), lower extremity(s) and promote motor control of bilateral, upper extremity(s), lower extremity(s).    Date:  7/6/22 Date:  7/8/22 Date:  7/11/22 Date:  7/18/22 Date:  8/1/22 Date:  8/3/22   Activity/Exercise         Sled  60 lb  3 x 30 ft  75 lb  3 x 30 ft 75 lb  3 x 30 ft 75 lb  3 x 30 ft  In circuit with split squat   Sit to stand     Red band, 10 lb, 17 in bench  3 x 5 reps    [employer]    15 lb  3 x 150ft  15 lb   3 x 150 ft   Rack pull 45 lb  3 x 5 reps 45 lb x 5 reps  50 lb 3 x 5 reps 45 lb x 8 reps 22 in   45 lb 3 x 5 reps from 15. 5 in boxs 45 lb x 8 reps 22 in   45 lb 3 x 5 reps from 15. 5 in boxs 45 lb x 8 reps 22 in   45 lb 3 x 5 reps from 15. 5 in boxs 15.5 in block  45 lb x 8 reps  50 lb 3 x 5 reps   Circuit 1. Sit to stand 10 lb, 18 in x 5 reps  2. Sled x 50 lb  3. Cunha carry 10 lb x 150 ft  3 rounds        Split squat     12 in block, B UE support  10x left/right 12 in block  B UE support  3 x 5 reps left/right  In circuit with sled   OH Press  15 lb  3 x 3 reps             HEP Log Date    cat/camel, L stretch, LTR, childsponse, open book 6/2/22   2.     3.    4.     5.            Treatment/Session Summary:      Treatment Assessment:    pt progressed deadlift in weight and from lower surface. pt initated on circuit to improve metabolic conditioning. pt verbalizes understanding of DOMS and movement to assist with pain management with chronic back pain,   Communication/Consultation:       None today   Equipment provided today:  HEP   Recommendations/Intent for next  treatment session:  Next visit will focus on strengthening, improving mobility, pain science.          Total Treatment Billable Duration: 38 minutes 7 min untimed  Time In: 1430  Time Out: 1515    Theron Samson, PT       Charge Capture  }Post Session Pain  MedBridge Portal  MD Guidelines  Scanned Media  Benefits  MyChart    Future Appointments   Date Time Provider Bryan Kamara   8/8/2022  2:30 PM Theron Samson PT [facility] AND HOME [facility]   8/10/2022  2:30 PM Theron Samson PT [facility]   8/15/2022  2:30 PM Sulma Fernando, PT [facility]   8/17/2022  9:45 AM HTF LAB RESOURCE [facility] AM[facility]   8/17/2022  2:30 PM Sulma Fernando PT [facility]   2/1/2023  8:15 AM Miguel Shabazz MD [facility] AMB

## 2022-08-08 ENCOUNTER — HOSPITAL ENCOUNTER (OUTPATIENT)
Dept: PHYSICAL THERAPY | Age: 77
Setting detail: RECURRING SERIES
Discharge: HOME | End: 2022-08-11
Payer: MEDICARE

## 2022-08-08 PROCEDURE — 97110 THERAPEUTIC EXERCISES: CPT

## 2022-08-08 PROCEDURE — 97530 THERAPEUTIC ACTIVITIES: CPT

## 2022-08-08 NOTE — PROGRESS NOTES
Manny Mcneil  : 1945  Primary: Medicare Part A And B  Secondary: [address] @ [place]  [address] [facility] [address]  Phone: [phone]  Fax: [phone] Plan Frequency: 2    Plan of Care/Certification Expiration Date: 22      PT Visit Info: Total # of Visits to Date: 15      OUTPATIENT PHYSICAL THERAPY:OP NOTE TYPE: Treatment Note 2022       Episode  }Appt Desk              Treatment Diagnosis:   Low back pain (M54.5)  Lumbago with Sciatica Left side (M54.42)  Muscle Weakness, Generalized (M62.81)  Muscle spasm of back (M62.830)  Pain in left hip (M25.552)    Medical/Referring Diagnosis:  Lumbago with sciatica, unspecified side [M54.40]  Referring Physician:  Toni Ellis MD MD Orders:  PT Eval and Treat   Date of Onset:  Onset Date: 22      Allergies:   Sulfa antibiotics  Restrictions/Precautions:  Restrictions/Precautions: None  No data recorded   Interventions Planned (Treatment may consist of any combination of the following):    Current Treatment Recommendations: Strengthening; ROM; Balance training; Functional mobility training; Transfer training; IADL training; Endurance training; Pain management; Manual Therapy - Joint Manipulation; Manual Therapy - Soft Tissue Mobilization; Return to work related activity; Home exercise program; Safety education & training; Patient/Caregiver education & training; Equipment evaluation, education, & procurement; Modalities; Integrated dry needling; Therapeutic activities     Subjective Comments:  pt reports she had some irritability and pain in the back after last session.   Initial:  /does not rate, soreness 10Post Session:        /dow snot erate, [email or web address]  Medications Last Reviewed:  2022  Updated Objective Findings:  None today  Treatment     THERAPEUTIC EXERCISE: ( 10 minutes):    Exercises per grid below to improve mobility, strength, balance, and coordination. Required minimal visual, verbal, manual and tactile cues to promote proper body mechanics. Progressed resistance and repetitions as indicated. Date:  7/6/22 Date:  7/8/22 Date:  7/11/22 Date:  7/13/22 Date:  7/18/22 Date:  7/20/22 Date:  8/1/22 Date:  8/3/22 Date:  8/8/22   Activity/Exercise            education    Assessment of BP before during and after exercise. Education on BP and safe ranges Assessment of progress toward goals, admin and scoring of Oswestry - see progress report for further details. L stretch 2 min  2 min  2 min 2 min 2 min 10 x 2 min   Treadmill 3.0 mph, incline 1, 40 calories, 8 min 3.0 mph, incline 1, 40 calories, 8 min 3.0 mph, incline 1, 40 calories, 8 min 2.5 to 3.0 mph x 6 min 10 min, 0.46 miles, 2% incline 5 min with pt reports an increase in radicular pain intensity with walking 8 min, incline 2%, 2.2 mph 8 min, incline 2%, 2.5 mph 8 min, incline 2%, 2.5 mph   Rows   23 lb  3 x 8 reps 23 lb  3 x 8 reps        RDL Corrective Red band  2 x 10 reps           Lat pull  Standing  20 lb x 8 res  23 lb 2 x 8 reps Seated  30 lb  3 x 8 reps         Open book      1:30 min  Left and right with holds      Lower Trunk rotation      30x with holds      Childspose to Quad rocking      30x      Cat/Cow      30 x          THERAPEUTIC ACTIVITY: ( 28 minutes): Therapeutic activities per grid below to improve mobility, strength, coordination, and dynamic movement of entire body to improve functional lifting, carrying, reaching, catching, and overhead activites. Required minimal visual, verbal, manual and tactile cues to promote coordination of bilateral, upper extremity(s), lower extremity(s) and promote motor control of bilateral, upper extremity(s), lower extremity(s).    Date:  7/6/22 Date:  7/8/22 Date:  7/11/22 Date:  7/18/22 Date:  8/1/22 Date:  8/3/22 Date:  8/8/22   Activity/Exercise          Sled  60 lb  3 x 30 ft  75 lb  3 x 30 ft 75 lb  3 x 30 ft 75 lb  3 x 30 ft  In circuit with split squat    Sit to stand     Red band, 10 lb, 17 in bench  3 x 5 reps  Red band, 10 lb, 18 in bench  3 x 8 reps  In circuit with side steps   [employer]    15 lb  3 x 150ft  15 lb   3 x 150 ft    Rack pull 45 lb  3 x 5 reps 45 lb x 5 reps  50 lb 3 x 5 reps 45 lb x 8 reps 22 in   45 lb 3 x 5 reps from 15. 5 in boxs 45 lb x 8 reps 22 in   45 lb 3 x 5 reps from 15. 5 in boxs 45 lb x 8 reps 22 in   45 lb 3 x 5 reps from 15. 5 in boxs 15.5 in block  45 lb x 8 reps  50 lb 3 x 5 reps 45 lb  3 x 5 reps   Circuit 1. Sit to stand 10 lb, 18 in x 5 reps  2. Sled x 50 lb  3. Cunha carry 10 lb x 150 ft  3 rounds         Split squat     12 in block, B UE support  10x left/right 12 in block  B UE support  3 x 5 reps left/right  In circuit with sled    OH Press  15 lb  3 x 3 reps        Side stepping       Red band  3 x 25 ft  In circuit with sit to stand         HEP Log Date    cat/camel, L stretch, LTR, childsponse, open book 6/2/22   2.     3.    4.     5.            Treatment/Session Summary:      Treatment Assessment:    pt returned to rack pull at rack height due to irritability. pt responds well to use of band to increase awareness for hip engagment throughout session. Communication/Consultation:       None today   Equipment provided today:  HEP   Recommendations/Intent for next  treatment session:  Next visit will focus on strengthening, improving mobility, pain science.          Total Treatment Billable Duration: 38 minutes 7 min untimed  Time In: 1430  Time Out: 1515    Carmen Carvalho PT       Charge Capture  }Post Session Pain  MedBridge Portal  MD Guidelines  Scanned Media  Benefits  MyChart    Future Appointments   Date Time Provider Bryan Kamara   8/10/2022  2:30 PM Carmen Carvalho PT [facility] [place] [facility]   8/15/2022  2:30 PM Carmen Carvalho PT [facility]   8/17/2022  9:45 AM HTF LAB RESOURCE [facility]F [facility] AMB   8/17/2022  2:30 PM Sulma Fernando PT [facility]   2/1/2023  8:15 AM Franky Cannon MD HTF [facility] AMB

## 2022-08-09 ENCOUNTER — TELEPHONE (OUTPATIENT)
Dept: FAMILY MEDICINE CLINIC | Facility: CLINIC | Age: 77
End: 2022-08-09

## 2022-08-09 NOTE — TELEPHONE ENCOUNTER
Patient saw Dr. Jeaneth Johnson a couple of weeks ago and was put on bp meds. Her bp this morning is 158/81 and she's very concerned. Please call patient at [phone] to advise.

## 2022-08-10 ENCOUNTER — HOSPITAL ENCOUNTER (OUTPATIENT)
Dept: PHYSICAL THERAPY | Age: 77
Setting detail: RECURRING SERIES
End: 2022-08-10
Payer: MEDICARE

## 2022-08-10 DIAGNOSIS — I10 PRIMARY HYPERTENSION: Primary | ICD-10-CM

## 2022-08-10 RX ORDER — CLOTRIMAZOLE AND BETAMETHASONE DIPROPIONATE 10; .64 MG/G; MG/G
CREAM TOPICAL 2 TIMES DAILY
Qty: 45 G | Refills: 1 | Status: SHIPPED | OUTPATIENT
Start: 2022-08-10

## 2022-08-10 RX ORDER — HYDROCHLOROTHIAZIDE 25 MG/1
25 TABLET ORAL EVERY MORNING
Qty: 30 TABLET | Refills: 5 | Status: SHIPPED | OUTPATIENT
Start: 2022-08-10 | End: 2022-09-28 | Stop reason: SDUPTHER

## 2022-08-10 NOTE — PROGRESS NOTES
Greg Wheeler  : 1945  Primary: Medicare Part A And B  Secondary: [address] @ [facility]  [address] [facility] [address]  Phone: [phone]  Fax: [phone] Plan Frequency: 2    Plan of Care/Certification Expiration Date: 22      PT Visit Info: Total # of Visits to Date: [address] THERAPY 8/10/2022     Appt Desk   Episode   MyChart      Ms. Myers cancelled appointment due to not feeling well after COVID booster.     Heather Parmar, PT    Future Appointments   Date Time Provider Bryan Kamara   8/10/2022  7:00 PM Litzy Bello [place], [facility] HOME O   8/15/2022  2:30 PM Heather Parmar, PT [facility]   2022  9:45 AM HTF LAB RESOURCE HTF [facility] AMB   2022  2:30 PM Sulma Fernando, PT [facility]   2023  8:15 AM Sheilah Phoenix, MD HTF [facility] AMB

## 2022-08-15 ENCOUNTER — HOSPITAL ENCOUNTER (OUTPATIENT)
Dept: PHYSICAL THERAPY | Age: 77
Setting detail: RECURRING SERIES
Discharge: HOME | End: 2022-08-18
Payer: MEDICARE

## 2022-08-15 PROCEDURE — 97530 THERAPEUTIC ACTIVITIES: CPT

## 2022-08-15 PROCEDURE — 97110 THERAPEUTIC EXERCISES: CPT

## 2022-08-15 NOTE — PROGRESS NOTES
Radha Nguyen  : 1945  Primary: Medicare Part A And B  Secondary: [address] @ [place]  [address] [facility] [address]  Phone: [phone]  Fax: [phone] Plan Frequency: 2    Plan of Care/Certification Expiration Date: 22      PT Visit Info: Total # of Visits to Date: 15      OUTPATIENT PHYSICAL THERAPY:OP NOTE TYPE: Treatment Note 8/15/2022       Episode  }Appt Desk              Treatment Diagnosis:   Low back pain (M54.5)  Lumbago with Sciatica Left side (M54.42)  Muscle Weakness, Generalized (M62.81)  Muscle spasm of back (M62.830)  Pain in left hip (M25.552)    Medical/Referring Diagnosis:  Lumbago with sciatica, unspecified side [M54.40]  Referring Physician:  Angela Roman MD MD Orders:  PT Eval and Treat   Date of Onset:  Onset Date: 22      Allergies:   Sulfa antibiotics  Restrictions/Precautions:  Restrictions/Precautions: None  No data recorded   Interventions Planned (Treatment may consist of any combination of the following):    Current Treatment Recommendations: Strengthening; ROM; Balance training; Functional mobility training; Transfer training; IADL training; Endurance training; Pain management; Manual Therapy - Joint Manipulation; Manual Therapy - Soft Tissue Mobilization; Return to work related activity; Home exercise program; Safety education & training; Patient/Caregiver education & training; Equipment evaluation, education, & procurement; Modalities; Integrated dry needling; Therapeutic activities     Subjective Comments:  pt reports she played golf and over the weekend. Initial:  /does not rate, soreness 10Post Session:        /dow snot erate, [id number]  Medications Last Reviewed:  8/15/2022  Updated Objective Findings:  None today  Treatment     THERAPEUTIC EXERCISE: ( 10 minutes):    Exercises per grid below to improve mobility, strength, balance, and coordination.   Required minimal visual, verbal, manual and tactile cues to promote proper body mechanics. Progressed resistance and repetitions as indicated. Date:  7/13/22 Date:  7/18/22 Date:  7/20/22 Date:  8/1/22 Date:  8/3/22 Date:  8/8/22 Date:  8/15/22   Activity/Exercise          education Assessment of BP before during and after exercise. Education on BP and safe ranges Assessment of progress toward goals, admin and scoring of Oswestry - see progress report for further details. L stretch  2 min 2 min 2 min 10 x 2 min    Treadmill 2.5 to 3.0 mph x 6 min 10 min, 0.46 miles, 2% incline 5 min with pt reports an increase in radicular pain intensity with walking 8 min, incline 2%, 2.2 mph 8 min, incline 2%, 2.5 mph 8 min, incline 2%, 2.5 mph 8 min, incline 2%, 2.5 mph   Rows 23 lb  3 x 8 reps         Open book   1:30 min  Left and right with holds       Lower Trunk rotation   30x with holds    Upper and lower trunk rotation on bench  10x   Childspose to Quad rocking   30x       Cat/Cow   30 x           THERAPEUTIC ACTIVITY: ( 28 minutes): Therapeutic activities per grid below to improve mobility, strength, coordination, and dynamic movement of entire body to improve functional lifting, carrying, reaching, catching, and overhead activites. Required minimal visual, verbal, manual and tactile cues to promote coordination of bilateral, upper extremity(s), lower extremity(s) and promote motor control of bilateral, upper extremity(s), lower extremity(s).    Date:  7/11/22 Date:  7/18/22 Date:  8/1/22 Date:  8/3/22 Date:  8/8/22 Date:  8/15/22   Activity/Exercise         Sled  75 lb  3 x 30 ft 75 lb  3 x 30 ft 75 lb  3 x 30 ft  In circuit with split squat     Sit to stand   Red band, 10 lb, 17 in bench  3 x 5 reps  Red band, 10 lb, 18 in bench  3 x 8 reps  In circuit with side steps    [employer]  15 lb  3 x 150ft  15 lb   3 x 150 ft  15 lb   3 x 150 ft   Rack pull 45 lb x 8 reps 22 in   45 lb 3 x 5 reps from 15. 5 in boxs 45 lb x 8 reps 22 in   45 lb 3 x 5 reps from 15. 5 in boxs 45 lb x 8 reps 22 in   45 lb 3 x 5 reps from 15. 5 in boxs 15.5 in block  45 lb x 8 reps  50 lb 3 x 5 reps 45 lb  3 x 5 reps KB DL, 30 lb  3 x 5 reps   Split squat   12 in block, B UE support  10x left/right 12 in block  B UE support  3 x 5 reps left/right  In circuit with sled     Side stepping     Red band  3 x 25 ft  In circuit with sit to stand Purple band  3 x 30 ft         HEP Log Date    cat/camel, L stretch, LTR, childsponse, open book 6/2/22   2. KB DL, quad trunk rotation 8/15/22   3.    4.     5.            Treatment/Session Summary:      Treatment Assessment:    pt and therapist modified exercises to accomodate for equipment she will have access to at her local gym. pt returns demo of exercises and verbalizes understanding of progression, volumne and internsity variables. Communication/Consultation:       None today   Equipment provided today:  HEP   Recommendations/Intent for next  treatment session:  Next visit will focus on strengthening, improving mobility, pain science.          Total Treatment Billable Duration: 38 minutes 7 min untimed  Time In: 1430  Time Out: 1515    Bel Ernst, PT       Charge Capture  }Post Session Pain  MedBridge Portal  MD Guidelines  Scanned Media  Benefits  MyChart    Future Appointments   Date Time Provider Bryan Kamara   8/17/2022  9:45 AM \[facility]\"" LAB RESOURCE F [facility] AMB   8/17/2022  2:30 PM Sulma Fernando, PT [facility]   8/26/2022  3:30 PM [facility] ROOM 4 [facility]   2/1/2023  8:15 AM Maryann Jasso MD F [facility] AMB

## 2022-08-17 ENCOUNTER — HOSPITAL ENCOUNTER (OUTPATIENT)
Dept: PHYSICAL THERAPY | Age: 77
Setting detail: RECURRING SERIES
Discharge: HOME | End: 2022-08-20
Payer: MEDICARE

## 2022-08-17 ENCOUNTER — NURSE ONLY (OUTPATIENT)
Dept: FAMILY MEDICINE CLINIC | Facility: CLINIC | Age: 77
End: 2022-08-17

## 2022-08-17 VITALS — DIASTOLIC BLOOD PRESSURE: 76 MMHG | SYSTOLIC BLOOD PRESSURE: 124 MMHG

## 2022-08-17 DIAGNOSIS — E78.5 HYPERLIPIDEMIA, UNSPECIFIED HYPERLIPIDEMIA TYPE: Primary | ICD-10-CM

## 2022-08-17 DIAGNOSIS — E78.5 HYPERLIPIDEMIA, UNSPECIFIED HYPERLIPIDEMIA TYPE: ICD-10-CM

## 2022-08-17 LAB
CHOLEST SERPL-MCNC: 231 MG/DL
HDLC SERPL-MCNC: 74 MG/DL (ref 40–60)
HDLC SERPL: 3.1 {RATIO}
LDLC SERPL CALC-MCNC: 138 MG/DL
TRIGL SERPL-MCNC: 95 MG/DL (ref 35–150)
VLDLC SERPL CALC-MCNC: 19 MG/DL (ref 6–23)

## 2022-08-17 PROCEDURE — 97110 THERAPEUTIC EXERCISES: CPT

## 2022-08-17 PROCEDURE — 97530 THERAPEUTIC ACTIVITIES: CPT

## 2022-08-17 NOTE — THERAPY DISCHARGE
Sonia Ruiz  : 1945  Primary: Medicare Part A And B  Secondary: [address] @ [facility]  [address] [facility] [address]  Phone: [phone]  Fax: [phone] Plan Frequency: 2    Plan of Care/Certification Expiration Date: 22      PT Visit Info: Total # of Visits to Date: 13      OUTPATIENT PHYSICAL THERAPY:OP NOTE TYPE: Discharge Summary 2022               Episode  Appt Desk         Treatment Diagnosis:ICD-10: Treatment Diagnosis: Low back pain (M54.5)  Lumbago with Sciatica Left side (M54.42)  Muscle Weakness, Generalized (M62.81)  Muscle spasm of back (M62.830)  Pain in left hip (M25.552)    * No diagnoses found *  Medical/Referring Diagnosis:  Lumbago with sciatica, unspecified side [M54.40]  Referring Physician:  Quinton Gavin MD MD Orders:  PT Eval and Treat   Return MD Appt:  TBD by patient  Date of Onset:  Onset Date: 22     Allergies:  Sulfa antibiotics  Restrictions/Precautions:    Restrictions/Precautions: None  No data recorded   Medications Last Reviewed:  2022     ASSESSMENT   Discharge Assessment: Mrs Catie Brenner has met 3/4 STGs and 4/5 LTGs over course of therapy intervention focusing on improving LE and lumbar strength, pain management, improving overall lumbar and hip mobility, and increasing activity tolerance for IADLs and golfing. Pt is able to push and pull 75 lbs, lift 50 lb from just below knee level and 30 lb from the floor, pt is able to walk on treadmill x 8 min with a 2% grade at 2.5 mph, she is also able to carry 40 lb over level terrain. Pt has been provided with a HEP and will continue with a community based wellness program. PT POC closed. PLAN          GOALS: (Goals have been discussed and agreed upon with patient.)   Short-Term Goals: 4 weeks  Goal Met   1. Sonia Ruiz will be independent with HEP to maintain functional gains made with therapy intervention.  1. [x] Date: 8/17/22   2. Martha Linder will be able to complete sit to stand 13 reps for improved strength and balance with no radicular symptoms. 2.  [] Date: not met 12 reps   3. Martha Linder will participate in walking program x 6 minutes at a distance of 1200 ft to be comparable to age related norms. 3.  [x] Date: 7/18/22   4. Martha Linder will be able to sit for 60 min in order to drive in car and complete work related responsibilities. 4.  [x] Date: 7/18/22              Long Term Goals: 8 weeks Goal Met   1. Martha Linder will demonstrate a 10 point improvement on the Oswestry to show improvement in function and participation in ADLs/IADLs 1. [] Date: not met   2. Martha Linder will be able to pull/push 50 lbs in order to complete household chores without restriction. 2.  [x] Date: 7/18/22   3. Martha Linder will demonstrate appropriate lifting technique from floor to waist level with 30 lbs and no cues from therapist to complete heavy household chores 3. [x] Date: 8/15/22   4. Martha Linder will be able to carry 15 lbs in bilateral UE in order to complete household chores, community participation, and work needs. 4.  [x] Date: 7/18/22   5. Martha Linder will participate in walking program x 6 min at >=1500 ft to prepare for return to golf. 5.  [x] Date: 7/18/22       Outcome Measure: Tool Used: Modified Oswestry Low Back Pain Questionnaire  Score:  Initial: 17/50  Most Recent: 11/50 (Date: 7/18/22) Most Recent: 13/50 (Date: 8/17/22)   Interpretation of Score: Each section is scored on a 0-5 scale, 5 representing the greatest disability. The scores of each section are added together for a total score of 50.       Tool Used: 30 sec sit to stand  Score:  Initial: 8 reps,  Most Recent: 10.5 reps (Date: 7/18/22 ) Most Recent: 12 reps (Date: 8/17/22 )       Total Duration: 38 min - see daily note  Time In: 1430  Time Out: 1515      Thank you for this referral,  Minoo Fernandes, PT Referring Physician Signature: Shannon Mathew MD                    Post Session Pain  Charge Capture   POC Link  Treatment Note Link  MD Guidelines  Zahra

## 2022-08-17 NOTE — PROGRESS NOTES
Surya Sat  : 1945  Primary: Medicare Part A And B  Secondary: [address] @ [facility]  [address] [facility] [address]  Phone: [phone]  Fax: [phone] Plan Frequency: 2    Plan of Care/Certification Expiration Date: 22      PT Visit Info: Total # of Visits to Date: 13      OUTPATIENT PHYSICAL THERAPY:OP NOTE TYPE: Treatment Note 2022       Episode  }Appt Desk              Treatment Diagnosis:   Low back pain (M54.5)  Lumbago with Sciatica Left side (M54.42)  Muscle Weakness, Generalized (M62.81)  Muscle spasm of back (M62.830)  Pain in left hip (M25.552)    Medical/Referring Diagnosis:  Lumbago with sciatica, unspecified side [M54.40]  Referring Physician:  Litzy Jones MD MD Orders:  PT Eval and Treat   Date of Onset:  Onset Date: 22      Allergies:   Sulfa antibiotics  Restrictions/Precautions:  Restrictions/Precautions: None  No data recorded   Interventions Planned (Treatment may consist of any combination of the following):    Current Treatment Recommendations: Strengthening; ROM; Balance training; Functional mobility training; Transfer training; IADL training; Endurance training; Pain management; Manual Therapy - Joint Manipulation; Manual Therapy - Soft Tissue Mobilization; Return to work related activity; Home exercise program; Safety education & training; Patient/Caregiver education & training; Equipment evaluation, education, & procurement; Modalities; Integrated dry needling; Therapeutic activities     Subjective Comments:  no specific reported changes  Initial:  /does not rate, soreness 10Post Session:        /doesnot rate, [id number]  Medications Last Reviewed:  2022  Updated Objective Findings:  see d/c summary  Treatment     THERAPEUTIC EXERCISE: ( 15 minutes):    Exercises per grid below to improve mobility, strength, balance, and coordination.   Required minimal visual, verbal, Cunha Carry  15 lb  3 x 150ft  15 lb   3 x 150 ft  15 lb   3 x 150 ft 20 lb  3 x 150 ft   Rack pull 45 lb x 8 reps 22 in   45 lb 3 x 5 reps from 15. 5 in boxs 45 lb x 8 reps 22 in   45 lb 3 x 5 reps from 15. 5 in boxs 45 lb x 8 reps 22 in   45 lb 3 x 5 reps from 15. 5 in boxs 15.5 in block  45 lb x 8 reps  50 lb 3 x 5 reps 45 lb  3 x 5 reps KB DL, 30 lb  3 x 5 reps 15.5  in box  45 lb x 8 reps  50 lb 4 x 5 reps   Split squat   12 in block, B UE support  10x left/right 12 in block  B UE support  3 x 5 reps left/right  In circuit with sled      Side stepping     Red band  3 x 25 ft  In circuit with sit to stand Purple band  3 x 30 ft                    HEP Log Date    cat/camel, L stretch, LTR, childsponse, open book 6/2/22   2. KB DL, quad trunk rotation 8/15/22   3.    4.     5.            Treatment/Session Summary:      Treatment Assessment:    see d/c summary for details.    Communication/Consultation:       None today   Equipment provided today:  HEP   Recommendations/Intent for next  treatment session:  D/C with HEP         Total Treatment Billable Duration: 38 minutes 7 min untimed  Time In: 1430  Time Out: 1515    Jose Elias Daniels, PT       Charge Capture  }Post Session Pain  Med[facility] Portal  MD Guidelines  Scanned Media  Benefits  MyChart    Future Appointments   Date Time Provider Bryan Kamara   8/26/2022  3:30 PM [facility] ROOM 4 [facility]   2/1/2023  8:15 AM Celso Bailon MD [facility]

## 2022-08-18 ENCOUNTER — TELEPHONE (OUTPATIENT)
Dept: FAMILY MEDICINE CLINIC | Facility: CLINIC | Age: 77
End: 2022-08-18

## 2022-08-18 NOTE — TELEPHONE ENCOUNTER
CALLED PT TO NOTIFY RESULTS AS PER DR FRENCH LEFT VOICEMAIL   Lipids are a little higher. Make sure she has a f/u appt for BP in a coupleweeks.  LR

## 2022-08-25 ENCOUNTER — HOSPITAL ENCOUNTER (OUTPATIENT)
Dept: MAMMOGRAPHY | Age: 77
Discharge: HOME | End: 2022-08-28
Payer: MEDICARE

## 2022-08-25 DIAGNOSIS — Z12.31 VISIT FOR SCREENING MAMMOGRAM: ICD-10-CM

## 2022-08-25 PROCEDURE — 77067 SCR MAMMO BI INCL CAD: CPT

## 2022-09-01 ENCOUNTER — OFFICE VISIT (OUTPATIENT)
Dept: FAMILY MEDICINE CLINIC | Facility: CLINIC | Age: 77
End: 2022-09-01
Payer: MEDICARE

## 2022-09-01 VITALS
HEART RATE: 65 BPM | WEIGHT: 165 LBS | TEMPERATURE: 97 F | BODY MASS INDEX: 26.52 KG/M2 | DIASTOLIC BLOOD PRESSURE: 72 MMHG | SYSTOLIC BLOOD PRESSURE: 110 MMHG | OXYGEN SATURATION: 96 % | HEIGHT: 66 IN

## 2022-09-01 DIAGNOSIS — F32.9 REACTIVE DEPRESSION: ICD-10-CM

## 2022-09-01 DIAGNOSIS — I10 PRIMARY HYPERTENSION: Primary | ICD-10-CM

## 2022-09-01 DIAGNOSIS — E78.2 MIXED HYPERLIPIDEMIA: ICD-10-CM

## 2022-09-01 PROCEDURE — 1036F TOBACCO NON-USER: CPT | Performed by: FAMILY MEDICINE

## 2022-09-01 PROCEDURE — G8400 PT W/DXA NO RESULTS DOC: HCPCS | Performed by: FAMILY MEDICINE

## 2022-09-01 PROCEDURE — 3017F COLORECTAL CA SCREEN DOC REV: CPT | Performed by: FAMILY MEDICINE

## 2022-09-01 PROCEDURE — 99213 OFFICE O/P EST LOW 20 MIN: CPT | Performed by: FAMILY MEDICINE

## 2022-09-01 PROCEDURE — G8417 CALC BMI ABV UP PARAM F/U: HCPCS | Performed by: FAMILY MEDICINE

## 2022-09-01 PROCEDURE — 1090F PRES/ABSN URINE INCON ASSESS: CPT | Performed by: FAMILY MEDICINE

## 2022-09-01 PROCEDURE — G8427 DOCREV CUR MEDS BY ELIG CLIN: HCPCS | Performed by: FAMILY MEDICINE

## 2022-09-01 PROCEDURE — 1123F ACP DISCUSS/DSCN MKR DOCD: CPT | Performed by: FAMILY MEDICINE

## 2022-09-01 ASSESSMENT — ENCOUNTER SYMPTOMS
ABDOMINAL PAIN: 0
SHORTNESS OF BREATH: 0
CHEST TIGHTNESS: 0
DIARRHEA: 0
COUGH: 0
EYE DISCHARGE: 0
CONSTIPATION: 0
SINUS PAIN: 0

## 2022-09-01 NOTE — PROGRESS NOTES
Maryanne Kelly is a 78 y.o. female who presents with   Chief Complaint   Patient presents with    Follow-up     B/P        History of Present Illness    BP has been doing well at home with addition of diuretic. No CP or SOB. No headaches or edema. No side effects with medications. Staying active. On Cymbalta, but still low mood at times since  passed away. Review of Systems  Review of Systems   Constitutional:  Negative for appetite change, fatigue and fever. HENT:  Negative for congestion, ear pain and sinus pain. Eyes:  Negative for discharge. Respiratory:  Negative for cough, chest tightness and shortness of breath. Cardiovascular:  Negative for chest pain, palpitations and leg swelling. Gastrointestinal:  Negative for abdominal pain, constipation and diarrhea. Genitourinary:  Negative for dysuria. Musculoskeletal:  Negative for joint swelling. Skin:  Negative for rash. Neurological:  Negative for headaches. Hematological:  Negative for adenopathy. Psychiatric/Behavioral:  Negative for dysphoric mood. The patient is not nervous/anxious. Medications  Current Outpatient Medications   Medication Sig Dispense Refill    clotrimazole-betamethasone (LOTRISONE) 1-0.05 % cream Apply topically 2 times daily 45 g 1    hydroCHLOROthiazide (HYDRODIURIL) 25 MG tablet Take 1 tablet by mouth every morning 30 tablet 5    valsartan (DIOVAN) 160 MG tablet Take 1 tablet by mouth in the morning. 90 tablet 3    atorvastatin (LIPITOR) 10 MG tablet Take 10 mg by mouth daily      DULoxetine (CYMBALTA) 60 MG extended release capsule Take 60 mg by mouth daily      gabapentin (NEURONTIN) 100 MG capsule Take 200 mg by mouth every 12 hours. omeprazole (PRILOSEC) 40 MG delayed release capsule Take 40 mg by mouth daily       No current facility-administered medications for this visit.         Past Medical History  Past Medical History:   Diagnosis Date    Anxiety 3/13/2013    Chest pain, unspecified 3/13/2013    Dyslipidemia 3/13/2013    GERD (gastroesophageal reflux disease) 3/13/2013    Hypercholesterolemia     Unstable angina ([facility] [address] 3/13/2013       Surgical History  Past Surgical History:   Procedure Laterality Date    BREAST BIOPSY      2000    COLONOSCOPY  2010    PELVIC LAPAROSCOPY      Infertility          Family History  Family History   Problem Relation Age of Onset    Heart Attack Mother         Age 62    Other Sister         brain anyuerism    Cancer Sister         lung    Heart Attack Brother         Age 61, stent, vein stripping    Breast Cancer Paternal Aunt 80    Breast Cancer Niece 46       Social History  Social History     Socioeconomic History    Marital status:      Spouse name: Not on file    Number of children: Not on file    Years of education: Not on file    Highest education level: Not on file   Occupational History    Not on file   Tobacco Use    Smoking status: Never    Smokeless tobacco: Never   Substance and Sexual Activity    Alcohol use: Yes     Alcohol/week: 10.0 standard drinks    Drug use: No    Sexual activity: Not on file   Other Topics Concern    Not on file   Social History Narrative    Not on file     Social Determinants of Health     Financial Resource Strain: Not on file   Food Insecurity: Not on file   Transportation Needs: Not on file   Physical Activity: Not on file   Stress: Not on file   Social Connections: Not on file   Intimate Partner Violence: Not on file   Housing Stability: Not on file       Social History     Tobacco Use   Smoking Status Never   Smokeless Tobacco Never       Allergies  Allergies   Allergen Reactions    Sulfa Antibiotics Rash       Vital Signs  Body mass index is 26.63 kg/m². Vitals:    09/01/22 1510   BP: 110/72   Pulse: 65   Temp: 97 °F (36.1 °C)   TempSrc: Temporal   SpO2: 96%   Weight: 165 lb (74.8 kg)   Height: 5' 6\" (1.676 m)       Physical Exam  Physical Exam  Vitals reviewed.    Constitutional:       Appearance: Normal appearance. HENT:      Head: Normocephalic. Right Ear: Tympanic membrane normal.      Left Ear: Tympanic membrane normal.      Nose: No congestion. Mouth/Throat:      Pharynx: No oropharyngeal exudate or posterior oropharyngeal erythema. Eyes:      Extraocular Movements: Extraocular movements intact. Conjunctiva/sclera: Conjunctivae normal.   Cardiovascular:      Rate and Rhythm: Normal rate and regular rhythm. Heart sounds: Normal heart sounds. No murmur heard. Pulmonary:      Effort: Pulmonary effort is normal.      Breath sounds: Normal breath sounds. No wheezing. Musculoskeletal:         General: No tenderness. Right lower leg: No edema. Left lower leg: No edema. Lymphadenopathy:      Cervical: No cervical adenopathy. Skin:     General: Skin is warm and dry. Findings: No rash. Neurological:      General: No focal deficit present. Mental Status: She is alert. Psychiatric:         Mood and Affect: Mood normal.         Thought Content: Thought content normal.        Assessment and Plan  Marylu Diaz was seen today for follow-up. Diagnoses and all orders for this visit:    Primary hypertension    Reactive depression    Mixed hyperlipidemia  BP improved; cont current meds/dosages Diovan and HCTZ  Add Wellbutrin 150XL- has unused old prescription- if this does well presley let us know  Recheck in 6 mo    On this date I have spent 20 minutes reviewing previous notes, test results and face to face with the patient discussing the diagnosis and importance of compliance with the treatment plan as well as documenting on the day of the visit.

## 2022-09-28 ENCOUNTER — OFFICE VISIT (OUTPATIENT)
Dept: FAMILY MEDICINE CLINIC | Facility: CLINIC | Age: 77
End: 2022-09-28
Payer: MEDICARE

## 2022-09-28 VITALS
BODY MASS INDEX: 27 KG/M2 | WEIGHT: 168 LBS | HEART RATE: 70 BPM | DIASTOLIC BLOOD PRESSURE: 70 MMHG | SYSTOLIC BLOOD PRESSURE: 132 MMHG | OXYGEN SATURATION: 97 % | RESPIRATION RATE: 18 BRPM | HEIGHT: 66 IN

## 2022-09-28 DIAGNOSIS — K21.9 GASTROESOPHAGEAL REFLUX DISEASE, UNSPECIFIED WHETHER ESOPHAGITIS PRESENT: ICD-10-CM

## 2022-09-28 DIAGNOSIS — E78.5 HYPERLIPIDEMIA, UNSPECIFIED HYPERLIPIDEMIA TYPE: ICD-10-CM

## 2022-09-28 DIAGNOSIS — F41.1 GENERALIZED ANXIETY DISORDER: Primary | ICD-10-CM

## 2022-09-28 DIAGNOSIS — I10 PRIMARY HYPERTENSION: ICD-10-CM

## 2022-09-28 DIAGNOSIS — F51.01 PRIMARY INSOMNIA: ICD-10-CM

## 2022-09-28 PROCEDURE — G8417 CALC BMI ABV UP PARAM F/U: HCPCS | Performed by: FAMILY MEDICINE

## 2022-09-28 PROCEDURE — 3017F COLORECTAL CA SCREEN DOC REV: CPT | Performed by: FAMILY MEDICINE

## 2022-09-28 PROCEDURE — 99213 OFFICE O/P EST LOW 20 MIN: CPT | Performed by: FAMILY MEDICINE

## 2022-09-28 PROCEDURE — 1090F PRES/ABSN URINE INCON ASSESS: CPT | Performed by: FAMILY MEDICINE

## 2022-09-28 PROCEDURE — G8400 PT W/DXA NO RESULTS DOC: HCPCS | Performed by: FAMILY MEDICINE

## 2022-09-28 PROCEDURE — 1036F TOBACCO NON-USER: CPT | Performed by: FAMILY MEDICINE

## 2022-09-28 PROCEDURE — G8427 DOCREV CUR MEDS BY ELIG CLIN: HCPCS | Performed by: FAMILY MEDICINE

## 2022-09-28 PROCEDURE — 1123F ACP DISCUSS/DSCN MKR DOCD: CPT | Performed by: FAMILY MEDICINE

## 2022-09-28 RX ORDER — HYDROCHLOROTHIAZIDE 25 MG/1
25 TABLET ORAL EVERY MORNING
Qty: 90 TABLET | Refills: 3 | Status: SHIPPED | OUTPATIENT
Start: 2022-09-28

## 2022-09-28 RX ORDER — GABAPENTIN 100 MG/1
200 CAPSULE ORAL EVERY 12 HOURS
Qty: 360 CAPSULE | Refills: 3 | Status: SHIPPED | OUTPATIENT
Start: 2022-09-28 | End: 2023-09-28

## 2022-09-28 RX ORDER — ATORVASTATIN CALCIUM 10 MG/1
10 TABLET, FILM COATED ORAL DAILY
Qty: 90 TABLET | Refills: 3 | Status: SHIPPED | OUTPATIENT
Start: 2022-09-28

## 2022-09-28 RX ORDER — LORAZEPAM 1 MG/1
1 TABLET ORAL NIGHTLY PRN
Qty: 20 TABLET | Refills: 1 | Status: SHIPPED | OUTPATIENT
Start: 2022-09-28 | End: 2022-10-28

## 2022-09-28 RX ORDER — DULOXETIN HYDROCHLORIDE 60 MG/1
60 CAPSULE, DELAYED RELEASE ORAL DAILY
Qty: 90 CAPSULE | Refills: 3 | Status: SHIPPED | OUTPATIENT
Start: 2022-09-28

## 2022-09-28 RX ORDER — VALSARTAN 160 MG/1
160 TABLET ORAL DAILY
Qty: 90 TABLET | Refills: 3 | Status: SHIPPED | OUTPATIENT
Start: 2022-09-28

## 2022-09-28 RX ORDER — OMEPRAZOLE 40 MG/1
40 CAPSULE, DELAYED RELEASE ORAL DAILY
Qty: 90 CAPSULE | Refills: 3 | Status: SHIPPED | OUTPATIENT
Start: 2022-09-28

## 2022-09-28 ASSESSMENT — PATIENT HEALTH QUESTIONNAIRE - PHQ9
SUM OF ALL RESPONSES TO PHQ QUESTIONS 1-9: 0
6. FEELING BAD ABOUT YOURSELF - OR THAT YOU ARE A FAILURE OR HAVE LET YOURSELF OR YOUR FAMILY DOWN: 0
SUM OF ALL RESPONSES TO PHQ QUESTIONS 1-9: 0
4. FEELING TIRED OR HAVING LITTLE ENERGY: 0
7. TROUBLE CONCENTRATING ON THINGS, SUCH AS READING THE NEWSPAPER OR WATCHING TELEVISION: 0
8. MOVING OR SPEAKING SO SLOWLY THAT OTHER PEOPLE COULD HAVE NOTICED. OR THE OPPOSITE, BEING SO FIGETY OR RESTLESS THAT YOU HAVE BEEN MOVING AROUND A LOT MORE THAN USUAL: 0
3. TROUBLE FALLING OR STAYING ASLEEP: 0
9. THOUGHTS THAT YOU WOULD BE BETTER OFF DEAD, OR OF HURTING YOURSELF: 0
SUM OF ALL RESPONSES TO PHQ QUESTIONS 1-9: 0
SUM OF ALL RESPONSES TO PHQ QUESTIONS 1-9: 0
SUM OF ALL RESPONSES TO PHQ9 QUESTIONS 1 & 2: 0
2. FEELING DOWN, DEPRESSED OR HOPELESS: 0
10. IF YOU CHECKED OFF ANY PROBLEMS, HOW DIFFICULT HAVE THESE PROBLEMS MADE IT FOR YOU TO DO YOUR WORK, TAKE CARE OF THINGS AT HOME, OR GET ALONG WITH OTHER PEOPLE: 0
1. LITTLE INTEREST OR PLEASURE IN DOING THINGS: 0
5. POOR APPETITE OR OVEREATING: 0

## 2022-09-28 ASSESSMENT — ENCOUNTER SYMPTOMS
COUGH: 0
DIARRHEA: 0
SINUS PAIN: 0
ABDOMINAL PAIN: 0
SHORTNESS OF BREATH: 0
CONSTIPATION: 0
CHEST TIGHTNESS: 0
EYE DISCHARGE: 0

## 2022-09-28 ASSESSMENT — ANXIETY QUESTIONNAIRES
7. FEELING AFRAID AS IF SOMETHING AWFUL MIGHT HAPPEN: 0
5. BEING SO RESTLESS THAT IT IS HARD TO SIT STILL: 0
IF YOU CHECKED OFF ANY PROBLEMS ON THIS QUESTIONNAIRE, HOW DIFFICULT HAVE THESE PROBLEMS MADE IT FOR YOU TO DO YOUR WORK, TAKE CARE OF THINGS AT HOME, OR GET ALONG WITH OTHER PEOPLE: NOT DIFFICULT AT ALL
6. BECOMING EASILY ANNOYED OR IRRITABLE: 0
2. NOT BEING ABLE TO STOP OR CONTROL WORRYING: 0
1. FEELING NERVOUS, ANXIOUS, OR ON EDGE: 0
GAD7 TOTAL SCORE: 0
3. WORRYING TOO MUCH ABOUT DIFFERENT THINGS: 0
4. TROUBLE RELAXING: 0

## 2022-09-28 NOTE — PROGRESS NOTES
Radha Nguyen is a 78 y.o. female who presents with   Chief Complaint   Patient presents with    Follow-up       History of Present Illness  BP has been doing well at home. No CP or SOB. No headaches or edema. No side effects with medications. Exercising regularly. Still some fatigue and feeling lonely since  . Not sleeping well. Review of Systems  Review of Systems   Constitutional:  Negative for appetite change, fatigue and fever. HENT:  Negative for congestion, ear pain and sinus pain. Eyes:  Negative for discharge. Respiratory:  Negative for cough, chest tightness and shortness of breath. Cardiovascular:  Negative for chest pain, palpitations and leg swelling. Gastrointestinal:  Negative for abdominal pain, constipation and diarrhea. Genitourinary:  Negative for dysuria. Musculoskeletal:  Negative for joint swelling. Skin:  Negative for rash. Neurological:  Negative for headaches. Hematological:  Negative for adenopathy. Psychiatric/Behavioral:  Negative for dysphoric mood. The patient is not nervous/anxious. Medications  Current Outpatient Medications   Medication Sig Dispense Refill    hydroCHLOROthiazide (HYDRODIURIL) 25 MG tablet Take 1 tablet by mouth every morning 90 tablet 3    valsartan (DIOVAN) 160 MG tablet Take 1 tablet by mouth daily 90 tablet 3    atorvastatin (LIPITOR) 10 MG tablet Take 1 tablet by mouth daily 90 tablet 3    DULoxetine (CYMBALTA) 60 MG extended release capsule Take 1 capsule by mouth daily 90 capsule 3    gabapentin (NEURONTIN) 100 MG capsule Take 2 capsules by mouth in the morning and 2 capsules in the evening. 360 capsule 3    omeprazole (PRILOSEC) 40 MG delayed release capsule Take 1 capsule by mouth daily 90 capsule 3    LORazepam (ATIVAN) 1 MG tablet Take 1 tablet by mouth nightly as needed for Anxiety for up to 30 days.  20 tablet 1    clotrimazole-betamethasone (LOTRISONE) 1-0.05 % cream Apply topically 2 times daily 45 g 1 No current facility-administered medications for this visit. Past Medical History  Past Medical History:   Diagnosis Date    Anxiety 3/13/2013    Chest pain, unspecified 3/13/2013    Dyslipidemia 3/13/2013    GERD (gastroesophageal reflux disease) 3/13/2013    Hypercholesterolemia     Unstable angina ([address] 3/13/2013       Surgical History  Past Surgical History:   Procedure Laterality Date    BREAST BIOPSY      2000    COLONOSCOPY  2010    PELVIC LAPAROSCOPY      Infertility          Family History  Family History   Problem Relation Age of Onset    Heart Attack Mother         Age 62    Other Sister         brain anyuerism    Cancer Sister         lung    Heart Attack Brother         Age 61, stent, vein stripping    Breast Cancer Paternal Aunt 80    Breast Cancer Niece 46       Social History  Social History     Socioeconomic History    Marital status:      Spouse name: Not on file    Number of children: Not on file    Years of education: Not on file    Highest education level: Not on file   Occupational History    Not on file   Tobacco Use    Smoking status: Never    Smokeless tobacco: Never   Substance and Sexual Activity    Alcohol use: Yes     Alcohol/week: 10.0 standard drinks    Drug use: No    Sexual activity: Not on file   Other Topics Concern    Not on file   Social History Narrative    Not on file     Social Determinants of Health     Financial Resource Strain: Not on file   Food Insecurity: Not on file   Transportation Needs: Not on file   Physical Activity: Not on file   Stress: Not on file   Social Connections: Not on file   Intimate Partner Violence: Not on file   Housing Stability: Not on file       Social History     Tobacco Use   Smoking Status Never   Smokeless Tobacco Never       Allergies  Allergies   Allergen Reactions    Sulfur Other (See Comments)    Sulfa Antibiotics Rash       Vital Signs  Body mass index is 27.12 kg/m².   Vitals:    09/28/22 1039 09/28/22 1109   BP: 102/60 132/70   Pulse: 70    Resp: 18    SpO2: 97%    Weight: 168 lb (76.2 kg)    Height: 5' 6\" (1.676 m)        Physical Exam  Physical Exam  Vitals reviewed. Constitutional:       Appearance: Normal appearance. HENT:      Head: Normocephalic. Right Ear: Tympanic membrane normal.      Left Ear: Tympanic membrane normal.      Nose: No congestion. Mouth/Throat:      Pharynx: No oropharyngeal exudate or posterior oropharyngeal erythema. Eyes:      Extraocular Movements: Extraocular movements intact. Conjunctiva/sclera: Conjunctivae normal.   Cardiovascular:      Rate and Rhythm: Normal rate and regular rhythm. Heart sounds: Normal heart sounds. No murmur heard. Pulmonary:      Effort: Pulmonary effort is normal.      Breath sounds: Normal breath sounds. No wheezing. Musculoskeletal:         General: No tenderness. Right lower leg: No edema. Left lower leg: No edema. Lymphadenopathy:      Cervical: No cervical adenopathy. Skin:     General: Skin is warm and dry. Findings: No rash. Neurological:      General: No focal deficit present. Mental Status: She is alert. Psychiatric:         Mood and Affect: Mood normal.         Thought Content: Thought content normal.        Assessment and Plan  Nancy Fallon was seen today for follow-up. Diagnoses and all orders for this visit:    Generalized anxiety disorder  -     DULoxetine (CYMBALTA) 60 MG extended release capsule; Take 1 capsule by mouth daily    Primary hypertension  -     hydroCHLOROthiazide (HYDRODIURIL) 25 MG tablet; Take 1 tablet by mouth every morning  -     valsartan (DIOVAN) 160 MG tablet; Take 1 tablet by mouth daily  -     LORazepam (ATIVAN) 1 MG tablet; Take 1 tablet by mouth nightly as needed for Anxiety for up to 30 days. Primary insomnia  -     LORazepam (ATIVAN) 1 MG tablet; Take 1 tablet by mouth nightly as needed for Anxiety for up to 30 days.     Hyperlipidemia, unspecified hyperlipidemia type  - atorvastatin (LIPITOR) 10 MG tablet; Take 1 tablet by mouth daily    Gastroesophageal reflux disease, unspecified whether esophagitis present  -     omeprazole (PRILOSEC) 40 MG delayed release capsule; Take 1 capsule by mouth daily    Other orders  -     gabapentin (NEURONTIN) 100 MG capsule; Take 2 capsules by mouth in the morning and 2 capsules in the evening. Add Ativan prn  Call if not improved  Watch BP  Recheck in Jan for awv      On this date I have spent 30 minutes reviewing previous notes, test results and face to face with the patient discussing the diagnosis and importance of compliance with the treatment plan as well as documenting on the day of the visit.

## 2023-02-01 ENCOUNTER — OFFICE VISIT (OUTPATIENT)
Dept: FAMILY MEDICINE CLINIC | Facility: CLINIC | Age: 78
End: 2023-02-01
Payer: MEDICARE

## 2023-02-01 VITALS
TEMPERATURE: 97.6 F | BODY MASS INDEX: 26.53 KG/M2 | OXYGEN SATURATION: 97 % | HEIGHT: 67 IN | WEIGHT: 169 LBS | HEART RATE: 76 BPM | DIASTOLIC BLOOD PRESSURE: 64 MMHG | SYSTOLIC BLOOD PRESSURE: 126 MMHG

## 2023-02-01 DIAGNOSIS — F32.9 REACTIVE DEPRESSION: ICD-10-CM

## 2023-02-01 DIAGNOSIS — I49.9 IRREGULAR HEART BEAT: ICD-10-CM

## 2023-02-01 DIAGNOSIS — I10 PRIMARY HYPERTENSION: ICD-10-CM

## 2023-02-01 DIAGNOSIS — M25.552 LEFT HIP PAIN: ICD-10-CM

## 2023-02-01 DIAGNOSIS — E78.5 HYPERLIPIDEMIA, UNSPECIFIED HYPERLIPIDEMIA TYPE: ICD-10-CM

## 2023-02-01 DIAGNOSIS — E78.2 MIXED HYPERLIPIDEMIA: ICD-10-CM

## 2023-02-01 DIAGNOSIS — Z00.00 ENCOUNTER FOR ANNUAL WELLNESS VISIT (AWV) IN MEDICARE PATIENT: Primary | ICD-10-CM

## 2023-02-01 LAB
APPEARANCE UR: CLEAR
BACTERIA URNS QL MICRO: 0 /HPF
BASOPHILS # BLD: 0.1 K/UL (ref 0–0.2)
BASOPHILS NFR BLD: 1 % (ref 0–2)
BILIRUB UR QL: NEGATIVE
CASTS URNS QL MICRO: 0 /LPF
COLOR UR: ABNORMAL
CRYSTALS URNS QL MICRO: 0 /LPF
DIFFERENTIAL METHOD BLD: ABNORMAL
EOSINOPHIL # BLD: 0.2 K/UL (ref 0–0.8)
EOSINOPHIL NFR BLD: 3 % (ref 0.5–7.8)
EPI CELLS #/AREA URNS HPF: ABNORMAL /HPF
ERYTHROCYTE [DISTWIDTH] IN BLOOD BY AUTOMATED COUNT: 13 % (ref 11.9–14.6)
GLUCOSE UR STRIP.AUTO-MCNC: NEGATIVE MG/DL
HCT VFR BLD AUTO: 38 % (ref 35.8–46.3)
HGB BLD-MCNC: 12.4 G/DL (ref 11.7–15.4)
HGB UR QL STRIP: NEGATIVE
IMM GRANULOCYTES # BLD AUTO: 0 K/UL (ref 0–0.5)
IMM GRANULOCYTES NFR BLD AUTO: 1 % (ref 0–5)
KETONES UR QL STRIP.AUTO: ABNORMAL MG/DL
LEUKOCYTE ESTERASE UR QL STRIP.AUTO: ABNORMAL
LYMPHOCYTES # BLD: 1.2 K/UL (ref 0.5–4.6)
LYMPHOCYTES NFR BLD: 26 % (ref 13–44)
MCH RBC QN AUTO: 31.6 PG (ref 26.1–32.9)
MCHC RBC AUTO-ENTMCNC: 32.6 G/DL (ref 31.4–35)
MCV RBC AUTO: 96.9 FL (ref 82–102)
MONOCYTES # BLD: 0.5 K/UL (ref 0.1–1.3)
MONOCYTES NFR BLD: 10 % (ref 4–12)
MUCOUS THREADS URNS QL MICRO: 0 /LPF
NEUTS SEG # BLD: 2.7 K/UL (ref 1.7–8.2)
NEUTS SEG NFR BLD: 59 % (ref 43–78)
NITRITE UR QL STRIP.AUTO: NEGATIVE
NRBC # BLD: 0 K/UL (ref 0–0.2)
OTHER OBSERVATIONS: ABNORMAL
PH UR STRIP: 7.5 (ref 5–9)
PLATELET # BLD AUTO: 278 K/UL (ref 150–450)
PMV BLD AUTO: 9.6 FL (ref 9.4–12.3)
PROT UR STRIP-MCNC: NEGATIVE MG/DL
RBC # BLD AUTO: 3.92 M/UL (ref 4.05–5.2)
RBC #/AREA URNS HPF: 0 /HPF
SP GR UR REFRACTOMETRY: 1.02 (ref 1–1.02)
URINE CULTURE IF INDICATED: ABNORMAL
UROBILINOGEN UR QL STRIP.AUTO: 1 EU/DL (ref 0.2–1)
WBC # BLD AUTO: 4.7 K/UL (ref 4.3–11.1)
WBC URNS QL MICRO: 0 /HPF

## 2023-02-01 PROCEDURE — 93000 ELECTROCARDIOGRAM COMPLETE: CPT | Performed by: FAMILY MEDICINE

## 2023-02-01 PROCEDURE — 3017F COLORECTAL CA SCREEN DOC REV: CPT | Performed by: FAMILY MEDICINE

## 2023-02-01 PROCEDURE — G8427 DOCREV CUR MEDS BY ELIG CLIN: HCPCS | Performed by: FAMILY MEDICINE

## 2023-02-01 PROCEDURE — G8484 FLU IMMUNIZE NO ADMIN: HCPCS | Performed by: FAMILY MEDICINE

## 2023-02-01 PROCEDURE — G8400 PT W/DXA NO RESULTS DOC: HCPCS | Performed by: FAMILY MEDICINE

## 2023-02-01 PROCEDURE — 1090F PRES/ABSN URINE INCON ASSESS: CPT | Performed by: FAMILY MEDICINE

## 2023-02-01 PROCEDURE — 3074F SYST BP LT 130 MM HG: CPT | Performed by: FAMILY MEDICINE

## 2023-02-01 PROCEDURE — 99214 OFFICE O/P EST MOD 30 MIN: CPT | Performed by: FAMILY MEDICINE

## 2023-02-01 PROCEDURE — G8417 CALC BMI ABV UP PARAM F/U: HCPCS | Performed by: FAMILY MEDICINE

## 2023-02-01 PROCEDURE — 1123F ACP DISCUSS/DSCN MKR DOCD: CPT | Performed by: FAMILY MEDICINE

## 2023-02-01 PROCEDURE — G0439 PPPS, SUBSEQ VISIT: HCPCS | Performed by: FAMILY MEDICINE

## 2023-02-01 PROCEDURE — 3078F DIAST BP <80 MM HG: CPT | Performed by: FAMILY MEDICINE

## 2023-02-01 PROCEDURE — 1036F TOBACCO NON-USER: CPT | Performed by: FAMILY MEDICINE

## 2023-02-01 SDOH — ECONOMIC STABILITY: HOUSING INSECURITY
IN THE LAST 12 MONTHS, WAS THERE A TIME WHEN YOU DID NOT HAVE A STEADY PLACE TO SLEEP OR SLEPT IN A SHELTER (INCLUDING NOW)?: NO

## 2023-02-01 SDOH — ECONOMIC STABILITY: INCOME INSECURITY: HOW HARD IS IT FOR YOU TO PAY FOR THE VERY BASICS LIKE FOOD, HOUSING, MEDICAL CARE, AND HEATING?: NOT HARD AT ALL

## 2023-02-01 SDOH — ECONOMIC STABILITY: FOOD INSECURITY: WITHIN THE PAST 12 MONTHS, THE FOOD YOU BOUGHT JUST DIDN'T LAST AND YOU DIDN'T HAVE MONEY TO GET MORE.: NEVER TRUE

## 2023-02-01 SDOH — ECONOMIC STABILITY: FOOD INSECURITY: WITHIN THE PAST 12 MONTHS, YOU WORRIED THAT YOUR FOOD WOULD RUN OUT BEFORE YOU GOT MONEY TO BUY MORE.: NEVER TRUE

## 2023-02-01 ASSESSMENT — PATIENT HEALTH QUESTIONNAIRE - PHQ9
SUM OF ALL RESPONSES TO PHQ QUESTIONS 1-9: 6
6. FEELING BAD ABOUT YOURSELF - OR THAT YOU ARE A FAILURE OR HAVE LET YOURSELF OR YOUR FAMILY DOWN: 1
SUM OF ALL RESPONSES TO PHQ9 QUESTIONS 1 & 2: 2
4. FEELING TIRED OR HAVING LITTLE ENERGY: 1
10. IF YOU CHECKED OFF ANY PROBLEMS, HOW DIFFICULT HAVE THESE PROBLEMS MADE IT FOR YOU TO DO YOUR WORK, TAKE CARE OF THINGS AT HOME, OR GET ALONG WITH OTHER PEOPLE: 1
7. TROUBLE CONCENTRATING ON THINGS, SUCH AS READING THE NEWSPAPER OR WATCHING TELEVISION: 0
3. TROUBLE FALLING OR STAYING ASLEEP: 1
1. LITTLE INTEREST OR PLEASURE IN DOING THINGS: 0
SUM OF ALL RESPONSES TO PHQ QUESTIONS 1-9: 6
2. FEELING DOWN, DEPRESSED OR HOPELESS: 2
9. THOUGHTS THAT YOU WOULD BE BETTER OFF DEAD, OR OF HURTING YOURSELF: 0
8. MOVING OR SPEAKING SO SLOWLY THAT OTHER PEOPLE COULD HAVE NOTICED. OR THE OPPOSITE, BEING SO FIGETY OR RESTLESS THAT YOU HAVE BEEN MOVING AROUND A LOT MORE THAN USUAL: 1
5. POOR APPETITE OR OVEREATING: 0

## 2023-02-01 ASSESSMENT — LIFESTYLE VARIABLES
HOW OFTEN DURING THE LAST YEAR HAVE YOU NEEDED AN ALCOHOLIC DRINK FIRST THING IN THE MORNING TO GET YOURSELF GOING AFTER A NIGHT OF HEAVY DRINKING: 0
HOW OFTEN DURING THE LAST YEAR HAVE YOU FOUND THAT YOU WERE NOT ABLE TO STOP DRINKING ONCE YOU HAD STARTED: 0
HOW OFTEN DO YOU HAVE A DRINK CONTAINING ALCOHOL: 4 OR MORE TIMES A WEEK
HAVE YOU OR SOMEONE ELSE BEEN INJURED AS A RESULT OF YOUR DRINKING: 0
HOW OFTEN DURING THE LAST YEAR HAVE YOU BEEN UNABLE TO REMEMBER WHAT HAPPENED THE NIGHT BEFORE BECAUSE YOU HAD BEEN DRINKING: 0
HOW OFTEN DURING THE LAST YEAR HAVE YOU FAILED TO DO WHAT WAS NORMALLY EXPECTED FROM YOU BECAUSE OF DRINKING: 0
HAS A RELATIVE, FRIEND, DOCTOR, OR ANOTHER HEALTH PROFESSIONAL EXPRESSED CONCERN ABOUT YOUR DRINKING OR SUGGESTED YOU CUT DOWN: 0
HOW MANY STANDARD DRINKS CONTAINING ALCOHOL DO YOU HAVE ON A TYPICAL DAY: 1 OR 2
HOW OFTEN DURING THE LAST YEAR HAVE YOU HAD A FEELING OF GUILT OR REMORSE AFTER DRINKING: 0

## 2023-02-01 NOTE — PATIENT INSTRUCTIONS
Learning About Mindfulness for Stress  What are mindfulness and stress? Stress is what you feel when you have to handle more than you are used to. A lot of things can cause stress. You may feel stress when you go on a job interview, take a test, or run a race. This kind of short-term stress is normal and even useful. It can help you if you need to work hard or react quickly. Stress also can last a long time. Long-term stress is caused by stressful situations or events. Examples of long-term stress include long-term health problems, ongoing problems at work, and conflicts in your family. Long-term stress can harm your health. Mindfulness is a focus only on things happening in the present moment. It's a process of purposefully paying attention to and being aware of your surroundings, your emotions, your thoughts, and how your body feels. You are aware of these things, but you aren't judging these experiences as \"good\" or \"bad. \" Mindfulness can help you learn to calm your mind and body to help you cope with illness, pain, and stress. How does mindfulness help to relieve stress? Mindfulness can help quiet your mind and relax your body. Studies show that it can help some people sleep better, feel less anxious, and bring their blood pressure down. And it's been shown to help some people live and cope better with certain health problems like heart disease, depression, chronic pain, and cancer. How do you practice mindfulness? To be mindful is to pay attention, to be present, and to be accepting. When you're mindful, you do just one thing and you pay close attention to that one thing. For example, you may sit quietly and notice your emotions or how your food tastes and smells. When you're present, you focus on the things that are happening right now. You let go of your thoughts about the past and the future. When you dwell on the past or the future, you miss moments that can heal and strengthen you.  You may miss moments like hearing a child laugh or seeing a friendly face when you think you're all alone. When you're accepting, you don't  the present moment. Instead you accept your thoughts and feelings as they come. You can practice anytime, anywhere, and in any way you choose. You can practice in many ways. Here are a few ideas:  While doing your chores, like washing the dishes, let your mind focus on what's in your hand. What does the dish feel like? Is the water warm or cold? Go outside and take a few deep breaths. What is the air like? Is it warm or cold? When you can, take some time at the start of your day to sit alone and think. Take a slow walk by yourself. Count your steps while you breathe in and out. Try yoga breathing exercises, stretches, and poses to strengthen and relax your muscles. At work, if you can, try to stop for a few moments each hour. Note how your body feels. Let yourself regroup and let your mind settle before you return to what you were doing. If you struggle with anxiety or \"worry thoughts,\" imagine your mind as a blue lucy and your worry thoughts as clouds. Now imagine those worry thoughts floating across your mind's lucy. Just let them pass by as you watch. Follow-up care is a key part of your treatment and safety. Be sure to make and go to all appointments, and call your doctor if you are having problems. It's also a good idea to know your test results and keep a list of the medicines you take. Where can you learn more? Go to http://www.dubois.com/ and enter M676 to learn more about \"Learning About Mindfulness for Stress. \"  Current as of: February 9, 2022               Content Version: 13.5  © 2006-2022 Healthwise, Incorporated. Care instructions adapted under license by [facility] ([facility]).  If you have questions about a medical condition or this instruction, always ask your healthcare professional. [address] any warranty or liability for your use of this information. Fatigue: Care Instructions  Your Care Instructions     Fatigue is a feeling of tiredness, exhaustion, or lack of energy. You may feel fatigue because of too much or not enough activity. It can also come from stress, lack of sleep, boredom, and poor diet. Many medical problems, such as viral infections, can cause fatigue. Emotional problems, especially depression, are often the cause of fatigue. Fatigue is most often a symptom of another problem. Treatment for fatigue depends on the cause. For example, if you have fatigue because you have a certain health problem, treating this problem also treats your fatigue. If depression or anxiety is the cause, treatment may help. Follow-up care is a key part of your treatment and safety. Be sure to make and go to all appointments, and call your doctor if you are having problems. It's also a good idea to know your test results and keep a list of the medicines you take. How can you care for yourself at home? Get regular exercise. But don't overdo it. Go back and forth between rest and exercise. Get plenty of rest.  Eat a healthy diet. Do not skip meals, especially breakfast.  Reduce your use of caffeine, tobacco, and alcohol. Caffeine is most often found in coffee, tea, cola drinks, and chocolate. Limit medicines that can cause fatigue. This includes tranquilizers and cold and allergy medicines. When should you call for help? Watch closely for changes in your health, and be sure to contact your doctor if:    You have new symptoms such as fever or a rash.     Your fatigue gets worse.     You have been feeling down, depressed, or hopeless. Or you may have lost interest in things that you usually enjoy.     You are not getting better as expected. Where can you learn more? Go to http://www.woods.com/ and enter L973 to learn more about \"Fatigue: Care Instructions. \"  Current as of: February 9, 4106               CPJUVXC Version: 13.5  © 2478-1087 [employer]. Care instructions adapted under license by [facility] ([facility]). If you have questions about a medical condition or this instruction, always ask your healthcare professional. [address] any warranty or liability for your use of this information. Learning About Stress  What is stress? Stress is what you feel when you have to handle more than you are used to. Stress is a fact of life for most people, and it affects everyone differently. What causes stress for you may not be stressful for someone else. A lot of things can cause stress. You may feel stress when you go on a job interview, take a test, or run a race. This kind of short-term stress is normal and even useful. It can help you if you need to work hard or react quickly. For example, stress can help you finish an important job on time. Stress also can last a long time. Long-term stress is caused by stressful situations or events. Examples of long-term stress include long-term health problems, ongoing problems at work, or conflicts in your family. Long-term stress can harm your health. How does stress affect your health? When you are stressed, your body responds as though you are in danger. It makes hormones that speed up your heart, make you breathe faster, and give you a burst of energy. This is called the fight-or-flight stress response. If the stress is over quickly, your body goes back to normal and no harm is done. But if stress happens too often or lasts too long, it can have bad effects. Long-term stress can make you more likely to get sick, and it can make symptoms of some diseases worse. If you tense up when you are stressed, you may develop neck, shoulder, or low back pain. Stress is linked to high blood pressure and heart disease. Stress also harms your emotional health. It can make you griffin, tense, or depressed.  Your relationships may suffer, and you may not do well at work or school. What can you do to manage stress? How to relax your mind   Write. It may help to write about things that are bothering you. This helps you find out how much stress you feel and what is causing it. When you know this, you can find better ways to cope. Let your feelings out. Talk, laugh, cry, and express anger when you need to. Talking with friends, family, a counselor, or a member of the clergy about your feelings is a healthy way to relieve stress. Do something you enjoy. For example, listen to music or go to a movie. Practice your hobby or do volunteer work. Meditate. This can help you relax, because you are not worrying about what happened before or what may happen in the future. Do guided imagery. Imagine yourself in any setting that helps you feel calm. You can use audiotapes, books, or a teacher to guide you. How to relax your body   Do something active. Exercise or activity can help reduce stress. Walking is a great way to get started. Even everyday activities such as housecleaning or yard work can help. Do breathing exercises. For example:  From a standing position, bend forward from the waist with your knees slightly bent. Let your arms dangle close to the floor. Breathe in slowly and deeply as you return to a standing position. Roll up slowly and lift your head last.  Hold your breath for just a few seconds in the standing position. Breathe out slowly and bend forward from the waist.  Try yoga or too chi. These techniques combine exercise and meditation. You may need some training at first to learn them. What can you do to prevent stress? Manage your time. This helps you find time to do the things you want and need to do. Get enough sleep. Your body recovers from the stresses of the day while you are sleeping. Get support. Your family, friends, and community can make a difference in how you experience stress. Where can you learn more?   Go to http://www.woods.com/ and enter N032 to learn more about \"Learning About Stress. \"  Current as of: October 6, 2021               Content Version: 13.5  © 2006-2022 Healthwise, [employer]. Care instructions adapted under license by [facility] ([facility]). If you have questions about a medical condition or this instruction, always ask your healthcare professional. [address] any warranty or liability for your use of this information. Advance Directives: Care Instructions  Overview  An advance directive is a legal way to state your wishes at the end of your life. It tells your family and your doctor what to do if you can't say what you want. There are two main types of advance directives. You can change them any time your wishes change. Living will. This form tells your family and your doctor your wishes about life support and other treatment. The form is also called a declaration. Medical power of . This form lets you name a person to make treatment decisions for you when you can't speak for yourself. This person is called a health care agent (health care proxy, health care surrogate). The form is also called a durable power of  for health care. If you do not have an advance directive, decisions about your medical care may be made by a family member, or by a doctor or a  who doesn't know you. It may help to think of an advance directive as a gift to the people who care for you. If you have one, they won't have to make tough decisions by themselves. For more information, including forms for your state, see the [address] website (www.caringinfo.org/planning/advance-directives/). Follow-up care is a key part of your treatment and safety. Be sure to make and go to all appointments, and call your doctor if you are having problems. It's also a good idea to know your test results and keep a list of the medicines you take.   What should you include in an advance directive? Many states have a unique advance directive form. (It may ask you to address specific issues.) Or you might use a universal form that's approved by many states. If your form doesn't tell you what to address, it may be hard to know what to include in your advance directive. Use the questions below to help you get started. Who do you want to make decisions about your medical care if you are not able to? What life-support measures do you want if you have a serious illness that gets worse over time or can't be cured? What are you most afraid of that might happen? (Maybe you're afraid of having pain, losing your independence, or being kept alive by machines.)  Where would you prefer to die? (Your home? A hospital? A nursing home?)  Do you want to donate your organs when you die? Do you want certain Buddhist practices performed before you die? When should you call for help? Be sure to contact your doctor if you have any questions. Where can you learn more? Go to http://www.dubois.com/ and enter R264 to learn more about \"Advance Directives: Care Instructions. \"  Current as of: June 16, 2022               Content Version: 13.5  © 8112-6724 [employer]. Care instructions adapted under license by [facility] ([facility]). If you have questions about a medical condition or this instruction, always ask your healthcare professional. [place] [address] any warranty or liability for your use of this information. A Healthy Heart: Care Instructions  Your Care Instructions     Coronary artery disease, also called heart disease, occurs when a substance called plaque builds up in the vessels that supply oxygen-rich blood to your heart muscle. This can narrow the blood vessels and reduce blood flow. A heart attack happens when blood flow is completely blocked. A high-fat diet, smoking, and other factors increase the risk of heart disease.   Your doctor has found that you have a chance of having heart disease. You can do lots of things to keep your heart healthy. It may not be easy, but you can change your diet, exercise more, and quit smoking. These steps really work to lower your chance of heart disease. Follow-up care is a key part of your treatment and safety. Be sure to make and go to all appointments, and call your doctor if you are having problems. It's also a good idea to know your test results and keep a list of the medicines you take. How can you care for yourself at home? Diet    Use less salt when you cook and eat. This helps lower your blood pressure. Taste food before salting. Add only a little salt when you think you need it. With time, your taste buds will adjust to less salt.     Eat fewer snack items, fast foods, canned soups, and other high-salt, high-fat, processed foods.     Read food labels and try to avoid saturated and trans fats. They increase your risk of heart disease by raising cholesterol levels.     Limit the amount of solid fat-butter, margarine, and shortening-you eat. Use olive, peanut, or canola oil when you cook. Bake, broil, and steam foods instead of frying them.     Eat a variety of fruit and vegetables every day. Dark green, deep orange, red, or yellow fruits and vegetables are especially good for you. Examples include spinach, carrots, peaches, and berries.     Foods high in fiber can reduce your cholesterol and provide important vitamins and minerals. High-fiber foods include whole-grain cereals and breads, oatmeal, beans, brown rice, citrus fruits, and apples.     Eat lean proteins. Heart-healthy proteins include seafood, lean meats and poultry, eggs, beans, peas, nuts, seeds, and soy products.     Limit drinks and foods with added sugar. These include candy, desserts, and soda pop. Lifestyle changes    If your doctor recommends it, get more exercise. Walking is a good choice. Bit by bit, increase the amount you walk every day.  Try for at least 30 minutes on most days of the week. You also may want to swim, bike, or do other activities.     Do not smoke. If you need help quitting, talk to your doctor about stop-smoking programs and medicines. These can increase your chances of quitting for good. Quitting smoking may be the most important step you can take to protect your heart. It is never too late to quit.     Limit alcohol to 2 drinks a day for men and 1 drink a day for women. Too much alcohol can cause health problems.     Manage other health problems such as diabetes, high blood pressure, and high cholesterol. If you think you may have a problem with alcohol or drug use, talk to your doctor. Medicines    Take your medicines exactly as prescribed. Call your doctor if you think you are having a problem with your medicine.     If your doctor recommends aspirin, take the amount directed each day. Make sure you take aspirin and not another kind of pain reliever, such as acetaminophen (Tylenol). When should you call for help? Call 911 if you have symptoms of a heart attack. These may include:    Chest pain or pressure, or a strange feeling in the chest.     Sweating.     Shortness of breath.     Pain, pressure, or a strange feeling in the back, neck, jaw, or upper belly or in one or both shoulders or arms.     Lightheadedness or sudden weakness.     A fast or irregular heartbeat. After you call 911, the  may tell you to chew 1 adult-strength or 2 to 4 low-dose aspirin. Wait for an ambulance. Do not try to drive yourself. Watch closely for changes in your health, and be sure to contact your doctor if you have any problems. Where can you learn more? Go to http://www.dubois.com/ and enter F075 to learn more about \"A Healthy Heart: Care Instructions. \"  Current as of: September 7, 2022               Content Version: 13.5  © 9441-9347 Healthwise, Incorporated. Care instructions adapted under license by [facility] ([facility]). If you have questions about a medical condition or this instruction, always ask your healthcare professional. Healthwise, [facility] disclaims any warranty or liability for your use of this information.      Personalized Preventive Plan for Iris Myers - 2/1/2023  Medicare offers a range of preventive health benefits. Some of the tests and screenings are paid in full while other may be subject to a deductible, co-insurance, and/or copay.    Some of these benefits include a comprehensive review of your medical history including lifestyle, illnesses that may run in your family, and various assessments and screenings as appropriate.    After reviewing your medical record and screening and assessments performed today your provider may have ordered immunizations, labs, imaging, and/or referrals for you.  A list of these orders (if applicable) as well as your Preventive Care list are included within your After Visit Summary for your review.    Other Preventive Recommendations:    A preventive eye exam performed by an eye specialist is recommended every 1-2 years to screen for glaucoma; cataracts, macular degeneration, and other eye disorders.  A preventive dental visit is recommended every 6 months.  Try to get at least 150 minutes of exercise per week or 10,000 steps per day on a pedometer .  Order or download the FREE \"Exercise & Physical Activity: Your Everyday Guide\" from [employer]. Call 1-978.819.7515 or search [employer] online.  You need 4711-9648 mg of calcium and 5371-7578 IU of vitamin D per day. It is possible to meet your calcium requirement with diet alone, but a vitamin D supplement is usually necessary to meet this goal.  When exposed to the sun, use a sunscreen that protects against both UVA and UVB radiation with an SPF of 30 or greater. Reapply every 2 to 3 hours or after sweating, drying off with a towel, or swimming.  Always wear a seat belt when  traveling in a car. Always wear a helmet when riding a bicycle or motorcycle.

## 2023-02-01 NOTE — PROGRESS NOTES
Medicare Annual Wellness Visit    Marylu York is here for Medicare AWV, Back Pain (Sciatica. Has seen PT which helped.), Stress (Ongoing family issues with daughter), and Hypertension (Concerned about BP, having fatigue and episodes of feeling \"off\")    Assessment & Plan   Encounter for annual wellness visit (AWV) in Medicare patient  Hyperlipidemia, unspecified hyperlipidemia type  -     Lipid Panel; Future  Reactive depression  Mixed hyperlipidemia  Primary hypertension  -     Comprehensive Metabolic Panel; Future  -     CBC with Auto Differential; Future  -     Urinalysis with Reflex to Culture; Future  Irregular heart beat  -     TSH; Future  -     EKG 12 Lead  Left hip pain      EKG with nsr, NS T wave changes; nl rate; irreg likely PVCs or PACs- reduce caffeine; await TSH  Call if mood not improved  BP doing well on current meds  Cont DIOVAN, HCTZ, Lipitor, Cymbalta, Neurontin, Prilosec at current doses  Xray L hip if sxs worsen; add Turmeric and Glucosanemia  Recheck in 6 mo    Recommendations for Preventive Services Due: see orders and patient instructions/AVS.  Recommended screening schedule for the next 5-10 years is provided to the patient in written form: see Patient Instructions/AVS.     Return for Medicare Annual Wellness Visit in 1 year. Subjective   The following acute and/or chronic problems were also addressed today:  Occas dizziness with standing quickly. Heart racing at times in AM.  Increased stress and anxiety related to stress with daughter. On Cymbalta and doing well with this. BP has been doing well at home. No CP or SOB. No headaches or edema. No side effects with medications-  Diovan. Tolerating Lipitor well. Recent L hip pain with walking. Worse in groin. Xray R hip 2021 looked good. Patient's complete Health Risk Assessment and screening values have been reviewed and are found in Flowsheets.  The following problems were reviewed today and where indicated follow up appointments were made and/or referrals ordered. Positive Risk Factor Screenings with Interventions:        Depression:  PHQ-2 Score: 2  PHQ-9 Total Score: 6    Interpretation:   1-4 = minimal  5-9 = mild  10-14 = moderate  15-19 = moderately severe  20-27 = severe  Interventions:  Mild depression; trouble with daughter          General HRA Questions:  Select all that apply: (!) New or Increased Fatigue, Stress    Fatigue Interventions:  Check labs, review meds; increase activity    Stress Interventions:  Discussed stresses; stress reduction             Advanced Directives:  Do you have a Living Will?: (!) No    Intervention:  has NO advanced directive - information provided                       Objective   Vitals:    02/01/23 0819   BP: 126/64   Pulse: 76   Temp: 97.6 °F (36.4 °C)   TempSrc: Temporal   SpO2: 97%   Weight: 169 lb (76.7 kg)   Height: 5' 6.5\" (1.689 m)      Body mass index is 26.87 kg/m².       General Appearance: alert and oriented to person, place and time, well developed and well- nourished, in no acute distress  Skin: warm and dry, no rash or erythema  Head: normocephalic and atraumatic  Eyes: pupils equal, round, and reactive to light, extraocular eye movements intact, conjunctivae normal  ENT: tympanic membrane, external ear and ear canal normal bilaterally, nose without deformity, nasal mucosa and turbinates normal without polyps  Neck: supple and non-tender without mass, no thyromegaly or thyroid nodules, no cervical lymphadenopathy  Pulmonary/Chest: clear to auscultation bilaterally- no wheezes, rales or rhonchi, normal air movement, no respiratory distress  Cardiovascular: normal rate, regular rhythm, normal S1 and S2, no murmurs, rubs, clicks, or gallops, distal pulses intact, no carotid bruits  Abdomen: soft, non-tender, non-distended, normal bowel sounds, no masses or organomegaly  Pelvic: normal external genitalia, vulva, vagina,uterus and adnexa; stool heme negative  Breast: appear normal, no suspicious masses, no skin or nipple changes or axillary nodes  Extremities: no cyanosis, clubbing or edema  Musculoskeletal: normal range of motion, no joint swelling, deformity or tenderness  Neurologic: reflexes normal and symmetric, no cranial nerve deficit, gait, coordination and speech normal       Allergies   Allergen Reactions    Sulfur Other (See Comments)    Sulfa Antibiotics Rash     Prior to Visit Medications    Medication Sig Taking? Authorizing Provider   hydroCHLOROthiazide (HYDRODIURIL) 25 MG tablet Take 1 tablet by mouth every morning Yes Debra Mcgregor MD   valsartan (DIOVAN) 160 MG tablet Take 1 tablet by mouth daily Yes Debra Mcgregor MD   atorvastatin (LIPITOR) 10 MG tablet Take 1 tablet by mouth daily Yes Debra Mcgregor MD   DULoxetine (CYMBALTA) 60 MG extended release capsule Take 1 capsule by mouth daily Yes Debra Mcgregor MD   gabapentin (NEURONTIN) 100 MG capsule Take 2 capsules by mouth in the morning and 2 capsules in the evening. Yes Debra Mcgregor MD   omeprazole (PRILOSEC) 40 MG delayed release capsule Take 1 capsule by mouth daily Yes Debra Mcgregor MD   clotrimazole-betamethasone (LOTRISONE) 1-0.05 % cream Apply topically 2 times daily Yes Debra Mcgregor MD       [facility] (Including outside providers/suppliers regularly involved in providing care):   Patient Care Team:  Debra Mcgregor MD as PCP - General  Debra Mcgregor MD as PCP - Empaneled Provider     Reviewed and updated this visit:  Tobacco  Allergies  Meds  Problems  Med Hx  Surg Hx  Soc Hx  Fam Hx               DEBRA MCGREGOR MD

## 2023-02-02 LAB
ALBUMIN SERPL-MCNC: 3.9 G/DL (ref 3.2–4.6)
ALBUMIN/GLOB SERPL: 1.1 (ref 0.4–1.6)
ALP SERPL-CCNC: 93 U/L (ref 50–136)
ALT SERPL-CCNC: 20 U/L (ref 12–65)
ANION GAP SERPL CALC-SCNC: 7 MMOL/L (ref 2–11)
AST SERPL-CCNC: 13 U/L (ref 15–37)
BILIRUB SERPL-MCNC: 0.6 MG/DL (ref 0.2–1.1)
BUN SERPL-MCNC: 19 MG/DL (ref 8–23)
CALCIUM SERPL-MCNC: 9.3 MG/DL (ref 8.3–10.4)
CHLORIDE SERPL-SCNC: 103 MMOL/L (ref 101–110)
CHOLEST SERPL-MCNC: 204 MG/DL
CO2 SERPL-SCNC: 30 MMOL/L (ref 21–32)
CREAT SERPL-MCNC: 1.1 MG/DL (ref 0.6–1)
GLOBULIN SER CALC-MCNC: 3.6 G/DL (ref 2.8–4.5)
GLUCOSE SERPL-MCNC: 108 MG/DL (ref 65–100)
HDLC SERPL-MCNC: 79 MG/DL (ref 40–60)
HDLC SERPL: 2.6
LDLC SERPL CALC-MCNC: 106.8 MG/DL
POTASSIUM SERPL-SCNC: 4.2 MMOL/L (ref 3.5–5.1)
PROT SERPL-MCNC: 7.5 G/DL (ref 6.3–8.2)
SODIUM SERPL-SCNC: 140 MMOL/L (ref 133–143)
TRIGL SERPL-MCNC: 91 MG/DL (ref 35–150)
TSH, 3RD GENERATION: 2.02 UIU/ML (ref 0.36–3.74)
VLDLC SERPL CALC-MCNC: 18.2 MG/DL (ref 6–23)

## 2023-02-03 DIAGNOSIS — R79.89 ELEVATED SERUM CREATININE: Primary | ICD-10-CM

## 2023-07-11 ENCOUNTER — OFFICE VISIT (OUTPATIENT)
Dept: FAMILY MEDICINE CLINIC | Facility: CLINIC | Age: 78
End: 2023-07-11
Payer: MEDICARE

## 2023-07-11 VITALS
HEART RATE: 68 BPM | WEIGHT: 169 LBS | OXYGEN SATURATION: 98 % | TEMPERATURE: 97.2 F | DIASTOLIC BLOOD PRESSURE: 66 MMHG | BODY MASS INDEX: 26.53 KG/M2 | HEIGHT: 67 IN | SYSTOLIC BLOOD PRESSURE: 118 MMHG

## 2023-07-11 DIAGNOSIS — R05.3 CHRONIC COUGH: Primary | ICD-10-CM

## 2023-07-11 DIAGNOSIS — I10 PRIMARY HYPERTENSION: ICD-10-CM

## 2023-07-11 DIAGNOSIS — E78.5 HYPERLIPIDEMIA, UNSPECIFIED HYPERLIPIDEMIA TYPE: ICD-10-CM

## 2023-07-11 DIAGNOSIS — F32.9 REACTIVE DEPRESSION: ICD-10-CM

## 2023-07-11 PROCEDURE — 99214 OFFICE O/P EST MOD 30 MIN: CPT | Performed by: FAMILY MEDICINE

## 2023-07-11 PROCEDURE — 1123F ACP DISCUSS/DSCN MKR DOCD: CPT | Performed by: FAMILY MEDICINE

## 2023-07-11 PROCEDURE — 3078F DIAST BP <80 MM HG: CPT | Performed by: FAMILY MEDICINE

## 2023-07-11 PROCEDURE — G8399 PT W/DXA RESULTS DOCUMENT: HCPCS | Performed by: FAMILY MEDICINE

## 2023-07-11 PROCEDURE — G8427 DOCREV CUR MEDS BY ELIG CLIN: HCPCS | Performed by: FAMILY MEDICINE

## 2023-07-11 PROCEDURE — 1036F TOBACCO NON-USER: CPT | Performed by: FAMILY MEDICINE

## 2023-07-11 PROCEDURE — G8417 CALC BMI ABV UP PARAM F/U: HCPCS | Performed by: FAMILY MEDICINE

## 2023-07-11 PROCEDURE — 1090F PRES/ABSN URINE INCON ASSESS: CPT | Performed by: FAMILY MEDICINE

## 2023-07-11 PROCEDURE — 3074F SYST BP LT 130 MM HG: CPT | Performed by: FAMILY MEDICINE

## 2023-07-11 RX ORDER — MONTELUKAST SODIUM 10 MG/1
10 TABLET ORAL DAILY
Qty: 30 TABLET | Refills: 3 | Status: SHIPPED | OUTPATIENT
Start: 2023-07-11

## 2023-07-11 ASSESSMENT — ENCOUNTER SYMPTOMS
COUGH: 0
CHEST TIGHTNESS: 0
SINUS PAIN: 0
EYE DISCHARGE: 0
DIARRHEA: 0
ABDOMINAL PAIN: 0
CONSTIPATION: 0
SHORTNESS OF BREATH: 0

## 2023-07-11 NOTE — PROGRESS NOTES
Naresh Vu is a 70 y.o. female who presents with   Chief Complaint   Patient presents with    Hypertension     Does not check BP at home regularly. Occasionally has pressure is head when moving from sitting to standing, but passes. Occasional dizziness. Denies edema. Hyperlipidemia       History of Present Illness  Chronic dry cough. No sputum or fever. No sob or cp. No smoking. Mood low at times. On Cymbalta. Family stress persists. Lipids good at last check. BP has been doing well at home. No CP or SOB. No headaches or edema. No side effects with medications. Exercising regularly. Review of Systems  Review of Systems   Constitutional:  Negative for appetite change, fatigue and fever. HENT:  Negative for congestion, ear pain and sinus pain. Eyes:  Negative for discharge. Respiratory:  Negative for cough, chest tightness and shortness of breath. Cardiovascular:  Negative for chest pain, palpitations and leg swelling. Gastrointestinal:  Negative for abdominal pain, constipation and diarrhea. Genitourinary:  Negative for dysuria. Musculoskeletal:  Negative for joint swelling. Skin:  Negative for rash. Neurological:  Negative for headaches. Hematological:  Negative for adenopathy. Psychiatric/Behavioral:  Negative for dysphoric mood. The patient is not nervous/anxious.        Medications  Current Outpatient Medications   Medication Sig Dispense Refill    montelukast (SINGULAIR) 10 MG tablet Take 1 tablet by mouth daily 30 tablet 3    hydroCHLOROthiazide (HYDRODIURIL) 25 MG tablet Take 1 tablet by mouth every morning 90 tablet 3    valsartan (DIOVAN) 160 MG tablet Take 1 tablet by mouth daily 90 tablet 3    atorvastatin (LIPITOR) 10 MG tablet Take 1 tablet by mouth daily 90 tablet 3    DULoxetine (CYMBALTA) 60 MG extended release capsule Take 1 capsule by mouth daily 90 capsule 3    gabapentin (NEURONTIN) 100 MG capsule Take 2 capsules by mouth in the morning and 2 capsules in

## 2023-09-11 ENCOUNTER — HOSPITAL ENCOUNTER (OUTPATIENT)
Dept: MAMMOGRAPHY | Age: 78
Discharge: HOME | End: 2023-09-14
Attending: FAMILY MEDICINE
Payer: MEDICARE

## 2023-09-11 DIAGNOSIS — Z12.31 VISIT FOR SCREENING MAMMOGRAM: ICD-10-CM

## 2023-09-11 PROCEDURE — 77067 SCR MAMMO BI INCL CAD: CPT

## 2023-09-28 ENCOUNTER — TELEPHONE (OUTPATIENT)
Dept: FAMILY MEDICINE CLINIC | Facility: CLINIC | Age: 78
End: 2023-09-28

## 2023-09-28 DIAGNOSIS — K21.9 GASTROESOPHAGEAL REFLUX DISEASE, UNSPECIFIED WHETHER ESOPHAGITIS PRESENT: ICD-10-CM

## 2023-09-28 DIAGNOSIS — I10 PRIMARY HYPERTENSION: ICD-10-CM

## 2023-09-28 DIAGNOSIS — F41.1 GENERALIZED ANXIETY DISORDER: ICD-10-CM

## 2023-09-28 RX ORDER — GABAPENTIN 100 MG/1
200 CAPSULE ORAL EVERY 12 HOURS
Qty: 120 CAPSULE | Refills: 0 | Status: SHIPPED | OUTPATIENT
Start: 2023-09-28 | End: 2024-09-27

## 2023-09-28 RX ORDER — VALSARTAN 160 MG/1
160 TABLET ORAL DAILY
Qty: 30 TABLET | Refills: 0 | Status: SHIPPED | OUTPATIENT
Start: 2023-09-28

## 2023-09-28 RX ORDER — HYDROCHLOROTHIAZIDE 25 MG/1
25 TABLET ORAL EVERY MORNING
Qty: 30 TABLET | Refills: 0 | Status: SHIPPED | OUTPATIENT
Start: 2023-09-28

## 2023-09-28 RX ORDER — OMEPRAZOLE 40 MG/1
40 CAPSULE, DELAYED RELEASE ORAL DAILY
Qty: 30 CAPSULE | Refills: 0 | Status: SHIPPED | OUTPATIENT
Start: 2023-09-28

## 2023-09-28 RX ORDER — DULOXETIN HYDROCHLORIDE 60 MG/1
60 CAPSULE, DELAYED RELEASE ORAL DAILY
Qty: 30 CAPSULE | Refills: 0 | Status: SHIPPED | OUTPATIENT
Start: 2023-09-28

## 2023-09-28 NOTE — TELEPHONE ENCOUNTER
Duloxetine 60 mg, Gabapentin 100 mg, Valsartan 160 mg, HCTZ 25 mg, Omeprazole 40 mg to be sent to [facility] in [address] [place] [address]. [phone]. Patient states she only needs enough of these for about 5 days until she gets back home.

## 2023-10-12 ENCOUNTER — HOSPITAL ENCOUNTER (OUTPATIENT)
Dept: GENERAL RADIOLOGY | Age: 78
Discharge: HOME | End: 2023-10-14
Payer: MEDICARE

## 2023-10-12 DIAGNOSIS — K21.9 GASTROESOPHAGEAL REFLUX DISEASE, UNSPECIFIED WHETHER ESOPHAGITIS PRESENT: ICD-10-CM

## 2023-10-12 DIAGNOSIS — E78.5 HYPERLIPIDEMIA, UNSPECIFIED HYPERLIPIDEMIA TYPE: ICD-10-CM

## 2023-10-12 DIAGNOSIS — R05.3 CHRONIC COUGH: ICD-10-CM

## 2023-10-12 PROCEDURE — 71046 X-RAY EXAM CHEST 2 VIEWS: CPT

## 2023-10-12 RX ORDER — ATORVASTATIN CALCIUM 10 MG/1
10 TABLET, FILM COATED ORAL DAILY
Qty: 90 TABLET | Refills: 3 | Status: SHIPPED | OUTPATIENT
Start: 2023-10-12

## 2023-10-12 RX ORDER — OMEPRAZOLE 40 MG/1
40 CAPSULE, DELAYED RELEASE ORAL DAILY
Qty: 30 CAPSULE | Refills: 0 | Status: SHIPPED | OUTPATIENT
Start: 2023-10-12

## 2023-11-21 ENCOUNTER — TELEPHONE (OUTPATIENT)
Dept: FAMILY MEDICINE CLINIC | Facility: CLINIC | Age: 78
End: 2023-11-21

## 2023-11-21 DIAGNOSIS — F41.1 GENERALIZED ANXIETY DISORDER: ICD-10-CM

## 2023-11-21 DIAGNOSIS — R05.3 CHRONIC COUGH: ICD-10-CM

## 2023-11-21 DIAGNOSIS — K21.9 GASTROESOPHAGEAL REFLUX DISEASE, UNSPECIFIED WHETHER ESOPHAGITIS PRESENT: ICD-10-CM

## 2023-11-21 DIAGNOSIS — I10 PRIMARY HYPERTENSION: ICD-10-CM

## 2023-11-21 DIAGNOSIS — E78.5 HYPERLIPIDEMIA, UNSPECIFIED HYPERLIPIDEMIA TYPE: ICD-10-CM

## 2023-11-21 RX ORDER — CLOTRIMAZOLE AND BETAMETHASONE DIPROPIONATE 10; .64 MG/G; MG/G
CREAM TOPICAL 2 TIMES DAILY
Qty: 45 G | Refills: 1 | Status: SHIPPED | OUTPATIENT
Start: 2023-11-21

## 2023-11-21 RX ORDER — VALSARTAN 160 MG/1
160 TABLET ORAL DAILY
Qty: 30 TABLET | Refills: 3 | Status: SHIPPED | OUTPATIENT
Start: 2023-11-21

## 2023-11-21 RX ORDER — DULOXETIN HYDROCHLORIDE 60 MG/1
60 CAPSULE, DELAYED RELEASE ORAL DAILY
Qty: 30 CAPSULE | Refills: 0 | Status: SHIPPED | OUTPATIENT
Start: 2023-11-21

## 2023-11-21 RX ORDER — OMEPRAZOLE 40 MG/1
40 CAPSULE, DELAYED RELEASE ORAL DAILY
Qty: 30 CAPSULE | Refills: 0 | Status: SHIPPED | OUTPATIENT
Start: 2023-11-21

## 2023-11-21 RX ORDER — HYDROCHLOROTHIAZIDE 25 MG/1
25 TABLET ORAL EVERY MORNING
Qty: 30 TABLET | Refills: 3 | Status: SHIPPED | OUTPATIENT
Start: 2023-11-21

## 2023-11-21 RX ORDER — ATORVASTATIN CALCIUM 10 MG/1
10 TABLET, FILM COATED ORAL DAILY
Qty: 90 TABLET | Refills: 3 | Status: SHIPPED | OUTPATIENT
Start: 2023-11-21

## 2023-11-21 RX ORDER — MONTELUKAST SODIUM 10 MG/1
10 TABLET ORAL DAILY
Qty: 30 TABLET | Refills: 3 | Status: SHIPPED | OUTPATIENT
Start: 2023-11-21

## 2023-11-21 RX ORDER — GABAPENTIN 100 MG/1
200 CAPSULE ORAL EVERY 12 HOURS
Qty: 120 CAPSULE | Refills: 3 | Status: SHIPPED | OUTPATIENT
Start: 2023-11-21 | End: 2024-11-20

## 2023-11-21 NOTE — TELEPHONE ENCOUNTER
Pt called and said she needs a refill on all of her meds.  Atorvastatin 10 MG tab  Clotrimazole-bethamethasone 1-0.05 cream  Duloxetine 60 MG capsule  Gabapentin 100 MG capsule  Hydrochlorothiazide 25 MG tab  Montelukast 10 MG tab  Omeprazole 40 MG capsule  Valsartan 160 MG tab    Send to [facility] in [place] [address]

## 2023-12-19 DIAGNOSIS — F41.1 GENERALIZED ANXIETY DISORDER: ICD-10-CM

## 2023-12-20 RX ORDER — DULOXETIN HYDROCHLORIDE 60 MG/1
60 CAPSULE, DELAYED RELEASE ORAL DAILY
Qty: 30 CAPSULE | Refills: 0 | OUTPATIENT
Start: 2023-12-20

## 2023-12-25 DIAGNOSIS — K21.9 GASTROESOPHAGEAL REFLUX DISEASE, UNSPECIFIED WHETHER ESOPHAGITIS PRESENT: ICD-10-CM

## 2023-12-25 RX ORDER — OMEPRAZOLE 40 MG/1
40 CAPSULE, DELAYED RELEASE ORAL DAILY
Qty: 90 CAPSULE | Refills: 0 | Status: SHIPPED | OUTPATIENT
Start: 2023-12-25

## 2024-02-13 ENCOUNTER — OFFICE VISIT (OUTPATIENT)
Dept: FAMILY MEDICINE CLINIC | Facility: CLINIC | Age: 79
End: 2024-02-13
Payer: MEDICARE

## 2024-02-13 VITALS
WEIGHT: 176.2 LBS | OXYGEN SATURATION: 96 % | BODY MASS INDEX: 29.36 KG/M2 | HEART RATE: 77 BPM | HEIGHT: 65 IN | TEMPERATURE: 97.9 F | SYSTOLIC BLOOD PRESSURE: 122 MMHG | DIASTOLIC BLOOD PRESSURE: 80 MMHG

## 2024-02-13 DIAGNOSIS — K21.9 GASTROESOPHAGEAL REFLUX DISEASE, UNSPECIFIED WHETHER ESOPHAGITIS PRESENT: ICD-10-CM

## 2024-02-13 DIAGNOSIS — F41.1 GENERALIZED ANXIETY DISORDER: ICD-10-CM

## 2024-02-13 DIAGNOSIS — E78.2 MIXED HYPERLIPIDEMIA: ICD-10-CM

## 2024-02-13 DIAGNOSIS — Z00.00 ENCOUNTER FOR ANNUAL WELLNESS VISIT (AWV) IN MEDICARE PATIENT: Primary | ICD-10-CM

## 2024-02-13 DIAGNOSIS — I10 PRIMARY HYPERTENSION: ICD-10-CM

## 2024-02-13 DIAGNOSIS — F32.9 REACTIVE DEPRESSION: ICD-10-CM

## 2024-02-13 DIAGNOSIS — R00.2 PALPITATIONS: ICD-10-CM

## 2024-02-13 LAB
ALBUMIN SERPL-MCNC: 4 G/DL (ref 3.2–4.6)
ALBUMIN/GLOB SERPL: 1.3 (ref 0.4–1.6)
ALP SERPL-CCNC: 87 U/L (ref 50–136)
ALT SERPL-CCNC: 18 U/L (ref 12–65)
ANION GAP SERPL CALC-SCNC: 4 MMOL/L (ref 2–11)
APPEARANCE UR: CLEAR
AST SERPL-CCNC: 13 U/L (ref 15–37)
BACTERIA URNS QL MICRO: 0 /HPF
BASOPHILS # BLD: 0 K/UL (ref 0–0.2)
BASOPHILS NFR BLD: 1 % (ref 0–2)
BILIRUB SERPL-MCNC: 0.6 MG/DL (ref 0.2–1.1)
BILIRUB UR QL: NEGATIVE
BUN SERPL-MCNC: 21 MG/DL (ref 8–23)
CALCIUM SERPL-MCNC: 9.7 MG/DL (ref 8.3–10.4)
CASTS URNS QL MICRO: 0 /LPF
CHLORIDE SERPL-SCNC: 105 MMOL/L (ref 103–113)
CHOLEST SERPL-MCNC: 215 MG/DL
CO2 SERPL-SCNC: 30 MMOL/L (ref 21–32)
COLOR UR: NORMAL
CREAT SERPL-MCNC: 1.3 MG/DL (ref 0.6–1)
CRYSTALS URNS QL MICRO: 0 /LPF
DIFFERENTIAL METHOD BLD: NORMAL
EOSINOPHIL # BLD: 0.3 K/UL (ref 0–0.8)
EOSINOPHIL NFR BLD: 5 % (ref 0.5–7.8)
EPI CELLS #/AREA URNS HPF: NORMAL /HPF
ERYTHROCYTE [DISTWIDTH] IN BLOOD BY AUTOMATED COUNT: 13.2 % (ref 11.9–14.6)
GLOBULIN SER CALC-MCNC: 3.2 G/DL (ref 2.8–4.5)
GLUCOSE SERPL-MCNC: 97 MG/DL (ref 65–100)
GLUCOSE UR STRIP.AUTO-MCNC: NEGATIVE MG/DL
HCT VFR BLD AUTO: 38.6 % (ref 35.8–46.3)
HDLC SERPL-MCNC: 88 MG/DL (ref 40–60)
HDLC SERPL: 2.4
HGB BLD-MCNC: 12.5 G/DL (ref 11.7–15.4)
HGB UR QL STRIP: NEGATIVE
IMM GRANULOCYTES # BLD AUTO: 0 K/UL (ref 0–0.5)
IMM GRANULOCYTES NFR BLD AUTO: 0 % (ref 0–5)
KETONES UR QL STRIP.AUTO: NEGATIVE MG/DL
LDLC SERPL CALC-MCNC: 107.8 MG/DL
LEUKOCYTE ESTERASE UR QL STRIP.AUTO: NEGATIVE
LYMPHOCYTES # BLD: 1.2 K/UL (ref 0.5–4.6)
LYMPHOCYTES NFR BLD: 22 % (ref 13–44)
MCH RBC QN AUTO: 30.5 PG (ref 26.1–32.9)
MCHC RBC AUTO-ENTMCNC: 32.4 G/DL (ref 31.4–35)
MCV RBC AUTO: 94.1 FL (ref 82–102)
MONOCYTES # BLD: 0.3 K/UL (ref 0.1–1.3)
MONOCYTES NFR BLD: 6 % (ref 4–12)
MUCOUS THREADS URNS QL MICRO: 0 /LPF
NEUTS SEG # BLD: 3.6 K/UL (ref 1.7–8.2)
NEUTS SEG NFR BLD: 66 % (ref 43–78)
NITRITE UR QL STRIP.AUTO: NEGATIVE
NRBC # BLD: 0 K/UL (ref 0–0.2)
OTHER OBSERVATIONS: NORMAL
PH UR STRIP: 8 (ref 5–9)
PLATELET # BLD AUTO: 223 K/UL (ref 150–450)
PMV BLD AUTO: 9.7 FL (ref 9.4–12.3)
POTASSIUM SERPL-SCNC: 4.4 MMOL/L (ref 3.5–5.1)
PROT SERPL-MCNC: 7.2 G/DL (ref 6.3–8.2)
PROT UR STRIP-MCNC: NEGATIVE MG/DL
RBC # BLD AUTO: 4.1 M/UL (ref 4.05–5.2)
RBC #/AREA URNS HPF: NORMAL /HPF
SODIUM SERPL-SCNC: 139 MMOL/L (ref 136–146)
SP GR UR REFRACTOMETRY: 1.02 (ref 1–1.02)
TRIGL SERPL-MCNC: 96 MG/DL (ref 35–150)
TSH W FREE THYROID IF ABNORMAL: 1.97 UIU/ML (ref 0.36–3.74)
URINE CULTURE IF INDICATED: NORMAL
UROBILINOGEN UR QL STRIP.AUTO: 1 EU/DL (ref 0.2–1)
VLDLC SERPL CALC-MCNC: 19.2 MG/DL (ref 6–23)
WBC # BLD AUTO: 5.4 K/UL (ref 4.3–11.1)
WBC URNS QL MICRO: NORMAL /HPF

## 2024-02-13 PROCEDURE — 3074F SYST BP LT 130 MM HG: CPT | Performed by: FAMILY MEDICINE

## 2024-02-13 PROCEDURE — 1123F ACP DISCUSS/DSCN MKR DOCD: CPT | Performed by: FAMILY MEDICINE

## 2024-02-13 PROCEDURE — G8417 CALC BMI ABV UP PARAM F/U: HCPCS | Performed by: FAMILY MEDICINE

## 2024-02-13 PROCEDURE — G8427 DOCREV CUR MEDS BY ELIG CLIN: HCPCS | Performed by: FAMILY MEDICINE

## 2024-02-13 PROCEDURE — 1090F PRES/ABSN URINE INCON ASSESS: CPT | Performed by: FAMILY MEDICINE

## 2024-02-13 PROCEDURE — G8484 FLU IMMUNIZE NO ADMIN: HCPCS | Performed by: FAMILY MEDICINE

## 2024-02-13 PROCEDURE — 1036F TOBACCO NON-USER: CPT | Performed by: FAMILY MEDICINE

## 2024-02-13 PROCEDURE — G0439 PPPS, SUBSEQ VISIT: HCPCS | Performed by: FAMILY MEDICINE

## 2024-02-13 PROCEDURE — G8399 PT W/DXA RESULTS DOCUMENT: HCPCS | Performed by: FAMILY MEDICINE

## 2024-02-13 PROCEDURE — 93000 ELECTROCARDIOGRAM COMPLETE: CPT | Performed by: FAMILY MEDICINE

## 2024-02-13 PROCEDURE — 3079F DIAST BP 80-89 MM HG: CPT | Performed by: FAMILY MEDICINE

## 2024-02-13 PROCEDURE — 99214 OFFICE O/P EST MOD 30 MIN: CPT | Performed by: FAMILY MEDICINE

## 2024-02-13 RX ORDER — GABAPENTIN 100 MG/1
200 CAPSULE ORAL EVERY 12 HOURS
Qty: 360 CAPSULE | Refills: 3 | Status: SHIPPED | OUTPATIENT
Start: 2024-02-13 | End: 2025-02-12

## 2024-02-13 RX ORDER — OMEPRAZOLE 40 MG/1
40 CAPSULE, DELAYED RELEASE ORAL DAILY
Qty: 90 CAPSULE | Refills: 3 | Status: SHIPPED | OUTPATIENT
Start: 2024-02-13

## 2024-02-13 RX ORDER — HYDROCHLOROTHIAZIDE 25 MG/1
25 TABLET ORAL EVERY MORNING
Qty: 90 TABLET | Refills: 3 | Status: SHIPPED | OUTPATIENT
Start: 2024-02-13

## 2024-02-13 RX ORDER — DULOXETIN HYDROCHLORIDE 60 MG/1
60 CAPSULE, DELAYED RELEASE ORAL DAILY
Qty: 90 CAPSULE | Refills: 3 | Status: SHIPPED | OUTPATIENT
Start: 2024-02-13

## 2024-02-13 RX ORDER — VALSARTAN 160 MG/1
160 TABLET ORAL DAILY
Qty: 90 TABLET | Refills: 3 | Status: SHIPPED | OUTPATIENT
Start: 2024-02-13

## 2024-02-13 NOTE — PROGRESS NOTES
Medicare Annual Wellness Visit    Iris Myers is here for Medicare AWV, Tinnitus (Intermittent, hears radio in AMs but no radio playing), and Dehydration (Dry mouth, especially when exercising)    Assessment & Plan   Encounter for annual wellness visit (AWV) in Medicare patient  Gastroesophageal reflux disease, unspecified whether esophagitis present  -     omeprazole (PRILOSEC) 40 MG delayed release capsule; Take 1 capsule by mouth daily, Disp-90 capsule, R-3Normal  Generalized anxiety disorder  -     DULoxetine (CYMBALTA) 60 MG extended release capsule; Take 1 capsule by mouth daily, Disp-90 capsule, R-3Normal  Primary hypertension  -     hydroCHLOROthiazide (HYDRODIURIL) 25 MG tablet; Take 1 tablet by mouth every morning, Disp-90 tablet, R-3Normal  -     valsartan (DIOVAN) 160 MG tablet; Take 1 tablet by mouth daily, Disp-90 tablet, R-3Normal  -     Comprehensive Metabolic Panel; Future  -     CBC with Auto Differential; Future  -     Lipid Panel; Future  -     Urinalysis with Reflex to Culture; Future  -     TSH with Reflex; Future  Reactive depression  Mixed hyperlipidemia  Palpitations  -     TSH with Reflex; Future  -     EKG 12 Lead  Palpitations- EKG normal following bigeminy on exam which resolved; will monitor EKG in iwatch- has training at [employer] store scheduled.  Limit coffee. Await labs.   Hyperlipidemia- watch diet; recheck  Increased stress-  Recommend HU; cont Cymbalta  HTN and GERD stable    30min for problems and related exam  15 min for AWV    Recommendations for Preventive Services Due: see orders and patient instructions/AVS.  Recommended screening schedule for the next 5-10 years is provided to the patient in written form: see Patient Instructions/AVS.     No follow-ups on file.     Subjective   The following acute and/or chronic problems were also addressed today:  Here for AWV.  Stress with daughter related to mental illness and drugs.  Heart racing and flutters at times.  No cp or sob.

## 2024-02-13 NOTE — PATIENT INSTRUCTIONS
glasses or contact lenses need to be.  Visual field tests   Your doctor may have you look through special machines.  Or your doctor may simply have you stare straight ahead while they move a finger into and out of your field of vision.  Color vision test   You look at pieces of printed test patterns in various colors. You say what number or symbol you see.  Your doctor may have you trace the number or symbol using a pointer.  How do these tests feel?  There is very little chance of having a problem from this test. If dilating drops are used for a vision test, they may make the eyes sting and cause a medicine taste in the mouth.  Follow-up care is a key part of your treatment and safety. Be sure to make and go to all appointments, and call your doctor if you are having problems. It's also a good idea to know your test results and keep a list of the medicines you take.  Where can you learn more?  Go to https://www.[employer].net/patientEd and enter G551 to learn more about \"Learning About Vision Tests.\"  Current as of: June 6, 2023               Content Version: 13.9  © 5064-5948 [employer].   Care instructions adapted under license by [employer]. If you have questions about a medical condition or this instruction, always ask your healthcare professional. [employer] disclaims any warranty or liability for your use of this information.           A Healthy Heart: Care Instructions  Your Care Instructions     Coronary artery disease, also called heart disease, occurs when a substance called plaque builds up in the vessels that supply oxygen-rich blood to your heart muscle. This can narrow the blood vessels and reduce blood flow. A heart attack happens when blood flow is completely blocked. A high-fat diet, smoking, and other factors increase the risk of heart disease.  Your doctor has found that you have a chance of having heart disease. You can do lots of things to keep your heart healthy.

## 2024-03-12 ENCOUNTER — HOSPITAL ENCOUNTER (EMERGENCY)
Age: 79
Discharge: HOME | End: 2024-03-12
Attending: STUDENT IN AN ORGANIZED HEALTH CARE EDUCATION/TRAINING PROGRAM
Payer: MEDICARE

## 2024-03-12 ENCOUNTER — APPOINTMENT (OUTPATIENT)
Dept: CT IMAGING | Age: 79
End: 2024-03-12
Payer: MEDICARE

## 2024-03-12 ENCOUNTER — APPOINTMENT (OUTPATIENT)
Dept: GENERAL RADIOLOGY | Age: 79
End: 2024-03-12
Payer: MEDICARE

## 2024-03-12 VITALS
BODY MASS INDEX: 27.32 KG/M2 | SYSTOLIC BLOOD PRESSURE: 129 MMHG | WEIGHT: 170 LBS | DIASTOLIC BLOOD PRESSURE: 93 MMHG | OXYGEN SATURATION: 98 % | HEART RATE: 73 BPM | HEIGHT: 66 IN | TEMPERATURE: 97.7 F | RESPIRATION RATE: 16 BRPM

## 2024-03-12 DIAGNOSIS — S52.044A CLOSED NONDISPLACED FRACTURE OF CORONOID PROCESS OF RIGHT ULNA, INITIAL ENCOUNTER: Primary | ICD-10-CM

## 2024-03-12 PROCEDURE — 71046 X-RAY EXAM CHEST 2 VIEWS: CPT

## 2024-03-12 PROCEDURE — 29105 APPLICATION LONG ARM SPLINT: CPT

## 2024-03-12 PROCEDURE — 99284 EMERGENCY DEPT VISIT MOD MDM: CPT

## 2024-03-12 PROCEDURE — 73080 X-RAY EXAM OF ELBOW: CPT

## 2024-03-12 PROCEDURE — 70450 CT HEAD/BRAIN W/O DYE: CPT

## 2024-03-12 PROCEDURE — 6370000000 HC RX 637 (ALT 250 FOR IP): Performed by: STUDENT IN AN ORGANIZED HEALTH CARE EDUCATION/TRAINING PROGRAM

## 2024-03-12 RX ORDER — HYDROCODONE BITARTRATE AND ACETAMINOPHEN 5; 325 MG/1; MG/1
1 TABLET ORAL EVERY 6 HOURS PRN
Qty: 8 TABLET | Refills: 0 | Status: SHIPPED | OUTPATIENT
Start: 2024-03-12 | End: 2024-03-15

## 2024-03-12 RX ORDER — IBUPROFEN 400 MG/1
400 TABLET ORAL ONCE
Status: COMPLETED | OUTPATIENT
Start: 2024-03-12 | End: 2024-03-12

## 2024-03-12 RX ADMIN — IBUPROFEN 400 MG: 400 TABLET, FILM COATED ORAL at 21:18

## 2024-03-12 ASSESSMENT — PAIN SCALES - GENERAL
PAINLEVEL_OUTOF10: 9
PAINLEVEL_OUTOF10: 9

## 2024-03-12 ASSESSMENT — LIFESTYLE VARIABLES
HOW OFTEN DO YOU HAVE A DRINK CONTAINING ALCOHOL: 2-4 TIMES A MONTH
HOW MANY STANDARD DRINKS CONTAINING ALCOHOL DO YOU HAVE ON A TYPICAL DAY: 1 OR 2

## 2024-03-12 ASSESSMENT — PAIN - FUNCTIONAL ASSESSMENT: PAIN_FUNCTIONAL_ASSESSMENT: 0-10

## 2024-03-12 ASSESSMENT — PAIN DESCRIPTION - ORIENTATION
ORIENTATION: RIGHT
ORIENTATION: RIGHT

## 2024-03-12 ASSESSMENT — PAIN DESCRIPTION - LOCATION
LOCATION: BACK;ARM
LOCATION: RIB CAGE;ELBOW

## 2024-03-12 ASSESSMENT — PAIN DESCRIPTION - DESCRIPTORS: DESCRIPTORS: ACHING

## 2024-03-12 NOTE — ED TRIAGE NOTES
Pt ambulatory to triage. Pt reports right elbow pain and right rib cage pain after falling. Pt states \"I think I was sleep walking last night when I fell.\" Pt states she was in the kitchen when she fell. Pt states she landed on her left side. Pt also states her daughters dog ran into her today causing her to fall. Pt denies hitting her head, denies loc, denies taking blood thinners.

## 2024-03-12 NOTE — DISCHARGE INSTRUCTIONS
Alternate Tylenol and Motrin as needed for discomfort.  Reserve Norco for severe, breakthrough pain.  Use caution as this medication can be sedating.  Do not drive or operate heavy machinery while taking this medication.  Leave your splint in place until seen by orthopedics.  A referral has been made for you.  Call their office if you do not hear from them in 2 business days.  Return to the ER for worsening or worrisome symptoms.

## 2024-03-12 NOTE — ED PROVIDER NOTES
hemorrhage.      XR ELBOW RIGHT (MIN 3 VIEWS)   Final Result   An avulsion of the coronoid process of the ulna is demonstrated with a mild   hemarthrosis.         XR CHEST (2 VW)   Final Result   No acute findings in the chest                      No results for input(s): \"COVID19\" in the last 72 hours.    Voice dictation software was used during the making of this note.  This software is not perfect and grammatical and other typographical errors may be present.  This note has not been completely proofread for errors.     Dimitrios Pitts,   03/12/24 6725

## 2024-03-13 ENCOUNTER — CARE COORDINATION (OUTPATIENT)
Dept: CARE COORDINATION | Facility: CLINIC | Age: 79
End: 2024-03-13

## 2024-03-13 NOTE — CARE COORDINATION
Ambulatory Care Management Note    Date/Time:  3/13/2024 9:28 AM    This patient was received as a referral from Daily assignment.  Ambulatory Care Manager outreached to patient today to offer care management services.   Introduction to self and role of care manager provided.  Patient declined care management services at this time.   No follow up call scheduled at this time.  Patient has Ambulatory Care Manager's contact number for for any questions or concerns.

## 2024-03-13 NOTE — ED NOTES
I have reviewed discharge instructions with the patient.  The patient verbalized understanding.    Patient left ED via Discharge Method: ambulatory to Home with self.    Opportunity for questions and clarification provided.       Patient given 1 scripts.         To continue your aftercare when you leave the hospital, you may receive an automated call from our care team to check in on how you are doing.  This is a free service and part of our promise to provide the best care and service to meet your aftercare needs.” If you have questions, or wish to unsubscribe from this service please call [phone].  Thank you for Choosing our [facility] Emergency Department.

## 2024-03-18 ENCOUNTER — OFFICE VISIT (OUTPATIENT)
Dept: ORTHOPEDIC SURGERY | Age: 79
End: 2024-03-18
Payer: MEDICARE

## 2024-03-18 DIAGNOSIS — S52.044A CLOSED NONDISPLACED FRACTURE OF CORONOID PROCESS OF RIGHT ULNA, INITIAL ENCOUNTER: Primary | ICD-10-CM

## 2024-03-18 PROCEDURE — G8427 DOCREV CUR MEDS BY ELIG CLIN: HCPCS | Performed by: ORTHOPAEDIC SURGERY

## 2024-03-18 PROCEDURE — 1090F PRES/ABSN URINE INCON ASSESS: CPT | Performed by: ORTHOPAEDIC SURGERY

## 2024-03-18 PROCEDURE — G8399 PT W/DXA RESULTS DOCUMENT: HCPCS | Performed by: ORTHOPAEDIC SURGERY

## 2024-03-18 PROCEDURE — 24670 CLTX ULNAR FX PROX W/O MNPJ: CPT | Performed by: ORTHOPAEDIC SURGERY

## 2024-03-18 PROCEDURE — 99204 OFFICE O/P NEW MOD 45 MIN: CPT | Performed by: ORTHOPAEDIC SURGERY

## 2024-03-18 PROCEDURE — 1036F TOBACCO NON-USER: CPT | Performed by: ORTHOPAEDIC SURGERY

## 2024-03-18 PROCEDURE — G8417 CALC BMI ABV UP PARAM F/U: HCPCS | Performed by: ORTHOPAEDIC SURGERY

## 2024-03-18 PROCEDURE — G8484 FLU IMMUNIZE NO ADMIN: HCPCS | Performed by: ORTHOPAEDIC SURGERY

## 2024-03-18 PROCEDURE — 1123F ACP DISCUSS/DSCN MKR DOCD: CPT | Performed by: ORTHOPAEDIC SURGERY

## 2024-03-18 NOTE — PROGRESS NOTES
Xray at next follow up:  AP lateral axial views right elbow    S52.044  Global  [id number]        MOIRA PRATER JR, MD

## 2024-03-22 ENCOUNTER — TELEPHONE (OUTPATIENT)
Dept: FAMILY MEDICINE CLINIC | Facility: CLINIC | Age: 79
End: 2024-03-22

## 2024-03-22 NOTE — TELEPHONE ENCOUNTER
----- Message from Chiquita Aguilar sent at 3/22/2024 11:16 AM EDT -----  Subject: Message to Provider    QUESTIONS  Information for Provider? Pt wants to know if her creatinine level is   going to be rechecked and when does PCP want that rechecked?; PLEASE   ADVISE PT  ---------------------------------------------------------------------------  --------------  CALL BACK INFO  [phone]; OK to leave message on voicemail  ---------------------------------------------------------------------------  --------------  SCRIPT ANSWERS  Relationship to Patient? Self

## 2024-03-25 ENCOUNTER — OFFICE VISIT (OUTPATIENT)
Dept: ORTHOPEDIC SURGERY | Age: 79
End: 2024-03-25

## 2024-03-25 DIAGNOSIS — S52.044D CLOSED NONDISPLACED FRACTURE OF CORONOID PROCESS OF RIGHT ULNA WITH ROUTINE HEALING, SUBSEQUENT ENCOUNTER: ICD-10-CM

## 2024-03-25 DIAGNOSIS — Z48.89 ENCOUNTER FOR POSTOPERATIVE CARE: Primary | ICD-10-CM

## 2024-03-25 PROCEDURE — 99024 POSTOP FOLLOW-UP VISIT: CPT | Performed by: ORTHOPAEDIC SURGERY

## 2024-03-25 NOTE — PROGRESS NOTES
Name: Iris Myers  YOB: 1945  Gender: female  MRN: [id number]              HPI: Iris Myers is a 78 y.o. right-hand-dominant female 2 weeks status post a nondisplaced coronoid fracture right elbow.  She returns and is doing well      ROS/Meds/PSH/PMH/FH/SH: A ten system review of systems was performed and is negative other than what is in the HPI.   Tobacco:  reports that she has never smoked. She has never used smokeless tobacco.  There were no vitals taken for this visit.     Physical Examination:  She is an awake alert pleasant female ambulating without difficulty      The left  elbow has a range of motion of 0 to 135 with 85 degrees of supination and 75 degrees of pronation.   Patient is non-tender over the medial epicondyle  non-tender over the ulnar nerve with no evidence of any subluxation or dislocation.   Negative tinel at the cubital tunnel  Negative flexor pronator stress test.  Patient is non-tender over the radial tunnel  non-tender over the lateral epicondyle with a negative wrist extensor stress test.   The patient is non-tender when shaking hands.    Negative middle finger extension stress test.  The biceps tendon is intact.    Present hook test.  Negative reverse kathe sign  The left elbow is stable at 0, 30, 70, 90, degrees.   No swelling or erythema over the olecranon bursa.   Patient has 2+ radial and ulnar pulses and neurovascularly is intact.     The right elbow has a range of motion of 10 to 125 with 85 degrees of supination and 75 degrees of pronation.   Global right elbow pain  No instability  Patient is non-tender over the medial epicondyle  non-tender over the ulnar nerve with no evidence of any subluxation or dislocation.   Negative tinel at the cubital tunnel  Negative flexor pronator stress test.  Patient is non-tender over the radial tunnel  non-tender over the lateral epicondyle with a negative wrist extensor stress test.   The patient is non-tender when

## 2024-04-03 DIAGNOSIS — R79.89 ELEVATED SERUM CREATININE: Primary | ICD-10-CM

## 2024-04-04 ENCOUNTER — NURSE ONLY (OUTPATIENT)
Dept: FAMILY MEDICINE CLINIC | Facility: CLINIC | Age: 79
End: 2024-04-04

## 2024-04-04 DIAGNOSIS — R79.89 ELEVATED SERUM CREATININE: ICD-10-CM

## 2024-04-04 LAB
ANION GAP SERPL CALC-SCNC: 5 MMOL/L (ref 2–11)
BUN SERPL-MCNC: 17 MG/DL (ref 8–23)
CALCIUM SERPL-MCNC: 9.7 MG/DL (ref 8.3–10.4)
CHLORIDE SERPL-SCNC: 103 MMOL/L (ref 103–113)
CO2 SERPL-SCNC: 29 MMOL/L (ref 21–32)
CREAT SERPL-MCNC: 1.2 MG/DL (ref 0.6–1)
GLUCOSE SERPL-MCNC: 102 MG/DL (ref 65–100)
POTASSIUM SERPL-SCNC: 4.3 MMOL/L (ref 3.5–5.1)
SODIUM SERPL-SCNC: 137 MMOL/L (ref 136–146)

## 2024-04-09 DIAGNOSIS — R79.89 ELEVATED SERUM CREATININE: Primary | ICD-10-CM

## 2024-04-30 ENCOUNTER — OFFICE VISIT (OUTPATIENT)
Dept: FAMILY MEDICINE CLINIC | Facility: CLINIC | Age: 79
End: 2024-04-30
Payer: MEDICARE

## 2024-04-30 VITALS
SYSTOLIC BLOOD PRESSURE: 148 MMHG | TEMPERATURE: 97.3 F | DIASTOLIC BLOOD PRESSURE: 70 MMHG | HEIGHT: 66 IN | OXYGEN SATURATION: 95 % | BODY MASS INDEX: 27.97 KG/M2 | HEART RATE: 69 BPM | WEIGHT: 174 LBS

## 2024-04-30 DIAGNOSIS — N18.31 CHRONIC KIDNEY DISEASE, STAGE 3A (HCC): ICD-10-CM

## 2024-04-30 DIAGNOSIS — I10 PRIMARY HYPERTENSION: ICD-10-CM

## 2024-04-30 DIAGNOSIS — R60.9 SWELLING: ICD-10-CM

## 2024-04-30 DIAGNOSIS — R07.89 CHEST TIGHTNESS: Primary | ICD-10-CM

## 2024-04-30 DIAGNOSIS — R00.2 PALPITATIONS: ICD-10-CM

## 2024-04-30 DIAGNOSIS — R79.89 ELEVATED SERUM CREATININE: ICD-10-CM

## 2024-04-30 LAB
ANION GAP SERPL CALC-SCNC: 12 MMOL/L (ref 9–18)
BASOPHILS # BLD: 0 K/UL (ref 0–0.2)
BASOPHILS NFR BLD: 1 % (ref 0–2)
BUN SERPL-MCNC: 18 MG/DL (ref 8–23)
CALCIUM SERPL-MCNC: 9.7 MG/DL (ref 8.8–10.2)
CHLORIDE SERPL-SCNC: 100 MMOL/L (ref 98–107)
CO2 SERPL-SCNC: 27 MMOL/L (ref 20–28)
CREAT SERPL-MCNC: 1.05 MG/DL (ref 0.6–1.1)
DIFFERENTIAL METHOD BLD: NORMAL
EOSINOPHIL # BLD: 0.2 K/UL (ref 0–0.8)
EOSINOPHIL NFR BLD: 5 % (ref 0.5–7.8)
ERYTHROCYTE [DISTWIDTH] IN BLOOD BY AUTOMATED COUNT: 13.4 % (ref 11.9–14.6)
GLUCOSE SERPL-MCNC: 94 MG/DL (ref 70–99)
HCT VFR BLD AUTO: 39.4 % (ref 35.8–46.3)
HGB BLD-MCNC: 12.7 G/DL (ref 11.7–15.4)
IMM GRANULOCYTES # BLD AUTO: 0 K/UL (ref 0–0.5)
IMM GRANULOCYTES NFR BLD AUTO: 0 % (ref 0–5)
LYMPHOCYTES # BLD: 1.3 K/UL (ref 0.5–4.6)
LYMPHOCYTES NFR BLD: 26 % (ref 13–44)
MCH RBC QN AUTO: 30 PG (ref 26.1–32.9)
MCHC RBC AUTO-ENTMCNC: 32.2 G/DL (ref 31.4–35)
MCV RBC AUTO: 92.9 FL (ref 82–102)
MONOCYTES # BLD: 0.5 K/UL (ref 0.1–1.3)
MONOCYTES NFR BLD: 9 % (ref 4–12)
NEUTS SEG # BLD: 3 K/UL (ref 1.7–8.2)
NEUTS SEG NFR BLD: 59 % (ref 43–78)
NRBC # BLD: 0 K/UL (ref 0–0.2)
PLATELET # BLD AUTO: 259 K/UL (ref 150–450)
PMV BLD AUTO: 10 FL (ref 9.4–12.3)
POTASSIUM SERPL-SCNC: 4.1 MMOL/L (ref 3.5–5.1)
RBC # BLD AUTO: 4.24 M/UL (ref 4.05–5.2)
SODIUM SERPL-SCNC: 139 MMOL/L (ref 136–145)
TSH W FREE THYROID IF ABNORMAL: 1.64 UIU/ML (ref 0.27–4.2)
WBC # BLD AUTO: 5 K/UL (ref 4.3–11.1)

## 2024-04-30 PROCEDURE — 3078F DIAST BP <80 MM HG: CPT | Performed by: FAMILY MEDICINE

## 2024-04-30 PROCEDURE — G8427 DOCREV CUR MEDS BY ELIG CLIN: HCPCS | Performed by: FAMILY MEDICINE

## 2024-04-30 PROCEDURE — 1036F TOBACCO NON-USER: CPT | Performed by: FAMILY MEDICINE

## 2024-04-30 PROCEDURE — G8399 PT W/DXA RESULTS DOCUMENT: HCPCS | Performed by: FAMILY MEDICINE

## 2024-04-30 PROCEDURE — 1090F PRES/ABSN URINE INCON ASSESS: CPT | Performed by: FAMILY MEDICINE

## 2024-04-30 PROCEDURE — 3077F SYST BP >= 140 MM HG: CPT | Performed by: FAMILY MEDICINE

## 2024-04-30 PROCEDURE — 1123F ACP DISCUSS/DSCN MKR DOCD: CPT | Performed by: FAMILY MEDICINE

## 2024-04-30 PROCEDURE — 99214 OFFICE O/P EST MOD 30 MIN: CPT | Performed by: FAMILY MEDICINE

## 2024-04-30 PROCEDURE — 93000 ELECTROCARDIOGRAM COMPLETE: CPT | Performed by: FAMILY MEDICINE

## 2024-04-30 PROCEDURE — G8417 CALC BMI ABV UP PARAM F/U: HCPCS | Performed by: FAMILY MEDICINE

## 2024-04-30 RX ORDER — METOPROLOL SUCCINATE 25 MG/1
25 TABLET, EXTENDED RELEASE ORAL DAILY
Qty: 30 TABLET | Refills: 5 | Status: SHIPPED | OUTPATIENT
Start: 2024-04-30

## 2024-04-30 ASSESSMENT — ENCOUNTER SYMPTOMS
CHEST TIGHTNESS: 1
SINUS PAIN: 0
ABDOMINAL PAIN: 0
SHORTNESS OF BREATH: 0
DIARRHEA: 0
COUGH: 0
CONSTIPATION: 0
EYE DISCHARGE: 0

## 2024-04-30 NOTE — PROGRESS NOTES
Insecurity: No Food Insecurity (2/13/2024)    Hunger Vital Sign     Worried About Running Out of Food in the Last Year: Never true     Ran Out of Food in the Last Year: Never true   Transportation Needs: Unknown (2/13/2024)    PRAPARE - Transportation     Lack of Transportation (Medical): Not on file     Lack of Transportation (Non-Medical): No   Physical Activity: Sufficiently Active (2/13/2024)    Exercise Vital Sign     Days of Exercise per Week: 7 days     Minutes of Exercise per Session: 30 min   Stress: Not on file   Social Connections: Not on file   Intimate Partner Violence: Not on file   Housing Stability: Unknown (2/13/2024)    Housing Stability Vital Sign     Unable to Pay for Housing in the Last Year: Not on file     Number of Places Lived in the Last Year: Not on file     Unstable Housing in the Last Year: No       Social History     Tobacco Use   Smoking Status Never   Smokeless Tobacco Never       Allergies  Allergies   Allergen Reactions    Sulfur Other (See Comments)    Sulfa Antibiotics Rash       Vital Signs  Body mass index is 28.08 kg/m².  Vitals:    04/30/24 1505 04/30/24 1600   BP: (!) 152/76 (!) 148/70   Pulse: 69    Temp: 97.3 °F (36.3 °C)    TempSrc: Temporal    SpO2: 95%    Weight: 78.9 kg (174 lb)    Height: 1.676 m (5' 6\")        Physical Exam  Physical Exam  Vitals reviewed.   Constitutional:       Appearance: Normal appearance.   HENT:      Head: Normocephalic.      Right Ear: Tympanic membrane normal.      Left Ear: Tympanic membrane normal.      Nose: No congestion.      Mouth/Throat:      Pharynx: No oropharyngeal exudate or posterior oropharyngeal erythema.   Eyes:      Extraocular Movements: Extraocular movements intact.      Conjunctiva/sclera: Conjunctivae normal.   Cardiovascular:      Rate and Rhythm: Normal rate. Rhythm irregular.      Heart sounds: Normal heart sounds. No murmur heard.     Comments: Periodic ectopic beats  Pulmonary:      Effort: Pulmonary effort is normal.

## 2024-05-01 ENCOUNTER — OFFICE VISIT (OUTPATIENT)
Dept: ORTHOPEDIC SURGERY | Age: 79
End: 2024-05-01

## 2024-05-01 DIAGNOSIS — Z48.89 ENCOUNTER FOR POSTOPERATIVE CARE: Primary | ICD-10-CM

## 2024-05-01 PROCEDURE — 99024 POSTOP FOLLOW-UP VISIT: CPT | Performed by: ORTHOPAEDIC SURGERY

## 2024-05-01 NOTE — PROGRESS NOTES
Name: Iris Myers  YOB: 1945  Gender: female  MRN: [id number]              HPI: Iris Myers is a 79 y.o. right-hand-dominant female 6 weeks status post a nondisplaced coronoid fracture right elbow.  She returns and is doing well      ROS/Meds/PSH/PMH/FH/SH: A ten system review of systems was performed and is negative other than what is in the HPI.   Tobacco:  reports that she has never smoked. She has never used smokeless tobacco.  There were no vitals taken for this visit.     Physical Examination:  She is an awake alert pleasant female ambulating without difficulty      The left  elbow has a range of motion of 0 to 135 with 85 degrees of supination and 75 degrees of pronation.   Patient is non-tender over the medial epicondyle  non-tender over the ulnar nerve with no evidence of any subluxation or dislocation.   Negative tinel at the cubital tunnel  Negative flexor pronator stress test.  Patient is non-tender over the radial tunnel  non-tender over the lateral epicondyle with a negative wrist extensor stress test.   The patient is non-tender when shaking hands.    Negative middle finger extension stress test.  The biceps tendon is intact.    Present hook test.  Negative reverse kathe sign  The left elbow is stable at 0, 30, 70, 90, degrees.   No swelling or erythema over the olecranon bursa.   Patient has 2+ radial and ulnar pulses and neurovascularly is intact.     The right elbow has a range of motion of 0 to 135 with 85 degrees of supination and 75 degrees of pronation.   Minimal right elbow pain  No instability  Patient is non-tender over the medial epicondyle  non-tender over the ulnar nerve with no evidence of any subluxation or dislocation.   Negative tinel at the cubital tunnel  Negative flexor pronator stress test.  Patient is non-tender over the radial tunnel  non-tender over the lateral epicondyle with a negative wrist extensor stress test.   The patient is non-tender when

## 2024-06-09 NOTE — PROGRESS NOTES
[facility] CARDIOLOGY  [address] [place] [address], [address]  [place] [address]  PHONE: [phone]      06/10/24    NAME:  Iris Myers  : 1945  MRN: [id number]         SUBJECTIVE:   Iris Myers is a 79 y.o. female seen for a consultation visit regarding the following:     Chief Complaint   Patient presents with    Consultation            HPI:  Consultation is requested by Vanessa Mcgregor MD for evaluation of Consultation   .    Consult chest discomfort, dyspnea.  She was placed on losartan and hctz for hypertension, called her pcp because she felt weak, advised her to back off the medication but the patient felt bloated.  She was having palpitations, sometimes some chest pressure noting a history of GERD.  Palpitations feel erratic.  Admits she doesn't eat on a very good schedule.  She is  and since then her diet has been off track.  Often after she eats certain foods.  Walks for exercise, feels pretty well when she does so, apart from chronic hip and knee pain.  She sweats since she has been on duloxetine. She took her last BB yesterday having run out, she felt a little more tired with it on board.      Drinks 1-2 cups coffee daily, not much water, trying to do better. No nicotine.             PAST CARDIAC HISTORY:  2019       Coronary calcium = 0        Cardiac Medications       Thiazides and Thiazide-Like Diuretics       hydroCHLOROthiazide (HYDRODIURIL) 25 MG tablet Take 1 tablet by mouth every morning       HMG CoA Reductase Inhibitors       atorvastatin (LIPITOR) 10 MG tablet Take 1 tablet by mouth daily       Angiotensin II Receptor Antagonists       valsartan (DIOVAN) 160 MG tablet Take 1 tablet by mouth daily                      Past Medical History, Past Surgical History, Family history, Social History, and Medications were all reviewed with the patient today and updated as necessary.     Prior to Admission medications    Medication Sig Start Date End Date Taking? Authorizing

## 2024-06-10 ENCOUNTER — INITIAL CONSULT (OUTPATIENT)
Age: 79
End: 2024-06-10
Payer: MEDICARE

## 2024-06-10 VITALS
SYSTOLIC BLOOD PRESSURE: 124 MMHG | WEIGHT: 172.6 LBS | HEART RATE: 80 BPM | DIASTOLIC BLOOD PRESSURE: 66 MMHG | HEIGHT: 66 IN | BODY MASS INDEX: 27.74 KG/M2

## 2024-06-10 DIAGNOSIS — R07.89 CHEST DISCOMFORT: Primary | ICD-10-CM

## 2024-06-10 DIAGNOSIS — R00.2 PALPITATIONS: ICD-10-CM

## 2024-06-10 PROCEDURE — 1123F ACP DISCUSS/DSCN MKR DOCD: CPT | Performed by: INTERNAL MEDICINE

## 2024-06-10 PROCEDURE — 99204 OFFICE O/P NEW MOD 45 MIN: CPT | Performed by: INTERNAL MEDICINE

## 2024-06-10 PROCEDURE — 1090F PRES/ABSN URINE INCON ASSESS: CPT | Performed by: INTERNAL MEDICINE

## 2024-06-10 PROCEDURE — G8427 DOCREV CUR MEDS BY ELIG CLIN: HCPCS | Performed by: INTERNAL MEDICINE

## 2024-06-10 PROCEDURE — G8417 CALC BMI ABV UP PARAM F/U: HCPCS | Performed by: INTERNAL MEDICINE

## 2024-06-10 PROCEDURE — G8399 PT W/DXA RESULTS DOCUMENT: HCPCS | Performed by: INTERNAL MEDICINE

## 2024-06-10 PROCEDURE — 1036F TOBACCO NON-USER: CPT | Performed by: INTERNAL MEDICINE

## 2024-06-10 ASSESSMENT — ENCOUNTER SYMPTOMS: SHORTNESS OF BREATH: 1

## 2024-08-01 ENCOUNTER — TELEPHONE (OUTPATIENT)
Dept: ORTHOPEDIC SURGERY | Age: 79
End: 2024-08-01

## 2024-08-01 NOTE — TELEPHONE ENCOUNTER
Called and LVM for pt to ask if she would be alright moving her appt from 10:45am on 8/7 to 10:30am on 8/7.

## 2024-08-07 ENCOUNTER — OFFICE VISIT (OUTPATIENT)
Dept: ORTHOPEDIC SURGERY | Age: 79
End: 2024-08-07
Payer: MEDICARE

## 2024-08-07 DIAGNOSIS — Z47.89 ORTHOPEDIC AFTERCARE: Primary | ICD-10-CM

## 2024-08-07 DIAGNOSIS — S52.044D CLOSED NONDISPLACED FRACTURE OF CORONOID PROCESS OF RIGHT ULNA WITH ROUTINE HEALING, SUBSEQUENT ENCOUNTER: ICD-10-CM

## 2024-08-07 PROCEDURE — G8417 CALC BMI ABV UP PARAM F/U: HCPCS | Performed by: ORTHOPAEDIC SURGERY

## 2024-08-07 PROCEDURE — 1036F TOBACCO NON-USER: CPT | Performed by: ORTHOPAEDIC SURGERY

## 2024-08-07 PROCEDURE — 99212 OFFICE O/P EST SF 10 MIN: CPT | Performed by: ORTHOPAEDIC SURGERY

## 2024-08-07 PROCEDURE — 1090F PRES/ABSN URINE INCON ASSESS: CPT | Performed by: ORTHOPAEDIC SURGERY

## 2024-08-07 PROCEDURE — 1123F ACP DISCUSS/DSCN MKR DOCD: CPT | Performed by: ORTHOPAEDIC SURGERY

## 2024-08-07 PROCEDURE — G8427 DOCREV CUR MEDS BY ELIG CLIN: HCPCS | Performed by: ORTHOPAEDIC SURGERY

## 2024-08-07 PROCEDURE — G8399 PT W/DXA RESULTS DOCUMENT: HCPCS | Performed by: ORTHOPAEDIC SURGERY

## 2024-08-07 NOTE — PROGRESS NOTES
lateral epicondyle with a negative wrist extensor stress test.   The patient is non-tender when shaking hands.    Negative middle finger extension stress test.  The biceps tendon is intact.    Present hook test.  Negative reverse kathe sign  The right elbow is stable at 0, 30, 70, 90, degrees.   No swelling or erythema over the olecranon bursa.   Patient has 2+ radial and ulnar pulses and neurovascularly is intact.       Data Reviewed:          XR: AP lateral axial views right elbow   Clinical Indication    ICD-10-CM    1. Orthopedic aftercare  Z47.89       2. Closed nondisplaced fracture of coronoid process of right ulna with routine healing, subsequent encounter  S52.044D XR ELBOW RIGHT (MIN 3 VIEWS)         Report: AP lateral axial views right elbow demonstrate a nondisplaced coronoid fracture right elbow.  The elbow is reduced.  No change in alignment.  The fracture has healed    Impression: As above   MOIRA PRATER JR, MD              Impression:   1. Orthopedic aftercare    2. Closed nondisplaced fracture of coronoid process of right ulna with routine healing, subsequent encounter       Closed nondisplaced coronoid fracture right elbow 5 months date of injury 3/11/2024  Hypertension  Hypercholesterolemia    Plan:   I discussed the problem with the patient.  Her elbow is doing very well.  She is complaining of right shoulder low back left knee pain and multiple joint issues.  She wants to get back working out.  5 standpoint she can do activities without restriction.  I will send her to [employer] at the [employer] to customize a home workout program for 2 visits.  I will recheck her back on an as-needed basis    2.  Self-limited problem    Follow up: No follow-ups on file.     Xray at next follow up:  AP lateral axial views right elbow    S52.044  Global  [id number]        MOIRA PRATER JR, MD

## 2024-08-14 ENCOUNTER — OFFICE VISIT (OUTPATIENT)
Dept: FAMILY MEDICINE CLINIC | Facility: CLINIC | Age: 79
End: 2024-08-14
Payer: MEDICARE

## 2024-08-14 VITALS
DIASTOLIC BLOOD PRESSURE: 80 MMHG | WEIGHT: 170.2 LBS | OXYGEN SATURATION: 98 % | SYSTOLIC BLOOD PRESSURE: 114 MMHG | HEART RATE: 69 BPM | BODY MASS INDEX: 27.47 KG/M2 | TEMPERATURE: 97.5 F

## 2024-08-14 DIAGNOSIS — G89.29 CHRONIC BILATERAL LOW BACK PAIN WITH BILATERAL SCIATICA: Primary | ICD-10-CM

## 2024-08-14 DIAGNOSIS — E78.5 HYPERLIPIDEMIA, UNSPECIFIED HYPERLIPIDEMIA TYPE: ICD-10-CM

## 2024-08-14 DIAGNOSIS — M54.42 CHRONIC BILATERAL LOW BACK PAIN WITH BILATERAL SCIATICA: Primary | ICD-10-CM

## 2024-08-14 DIAGNOSIS — R05.3 CHRONIC COUGH: ICD-10-CM

## 2024-08-14 DIAGNOSIS — M54.41 CHRONIC BILATERAL LOW BACK PAIN WITH BILATERAL SCIATICA: Primary | ICD-10-CM

## 2024-08-14 PROCEDURE — 1123F ACP DISCUSS/DSCN MKR DOCD: CPT | Performed by: FAMILY MEDICINE

## 2024-08-14 PROCEDURE — G8417 CALC BMI ABV UP PARAM F/U: HCPCS | Performed by: FAMILY MEDICINE

## 2024-08-14 PROCEDURE — G8427 DOCREV CUR MEDS BY ELIG CLIN: HCPCS | Performed by: FAMILY MEDICINE

## 2024-08-14 PROCEDURE — G8399 PT W/DXA RESULTS DOCUMENT: HCPCS | Performed by: FAMILY MEDICINE

## 2024-08-14 PROCEDURE — 1090F PRES/ABSN URINE INCON ASSESS: CPT | Performed by: FAMILY MEDICINE

## 2024-08-14 PROCEDURE — 99214 OFFICE O/P EST MOD 30 MIN: CPT | Performed by: FAMILY MEDICINE

## 2024-08-14 PROCEDURE — 1036F TOBACCO NON-USER: CPT | Performed by: FAMILY MEDICINE

## 2024-08-14 RX ORDER — ATORVASTATIN CALCIUM 10 MG/1
10 TABLET, FILM COATED ORAL DAILY
Qty: 90 TABLET | Refills: 3 | Status: SHIPPED | OUTPATIENT
Start: 2024-08-14

## 2024-08-14 RX ORDER — MONTELUKAST SODIUM 10 MG/1
10 TABLET ORAL DAILY
Qty: 30 TABLET | Refills: 5 | Status: SHIPPED | OUTPATIENT
Start: 2024-08-14

## 2024-08-14 ASSESSMENT — ENCOUNTER SYMPTOMS
ABDOMINAL PAIN: 0
CHEST TIGHTNESS: 0
EYE DISCHARGE: 0
CONSTIPATION: 0
DIARRHEA: 0
SINUS PAIN: 0
COUGH: 0
SHORTNESS OF BREATH: 0

## 2024-08-14 NOTE — PROGRESS NOTES
Iris Myers is a 79 y.o. female who presents with   Chief Complaint   Patient presents with    Hypertension     Does not check BP regularly at home. Denies unusual HAs, edema. Has occasional dizziness.    Joint Pain       History of Present Illness    Pt with increased joint pain, knees, hips, low back. Sxs shoot from low back into hips and knees.   Prior hip xrays look good.  No injury. BP has been doing well at home.  No CP or SOB.  No headaches or edema.  No side effects with medications.  Exercising regularly.  Cough improved with Singulair.    Review of Systems  Review of Systems   Constitutional:  Negative for appetite change, fatigue and fever.   HENT:  Negative for congestion, ear pain and sinus pain.    Eyes:  Negative for discharge.   Respiratory:  Negative for cough, chest tightness and shortness of breath.    Cardiovascular:  Negative for chest pain, palpitations and leg swelling.   Gastrointestinal:  Negative for abdominal pain, constipation and diarrhea.   Genitourinary:  Negative for dysuria.   Musculoskeletal:  Positive for arthralgias. Negative for joint swelling.   Skin:  Negative for rash.   Neurological:  Negative for headaches.   Hematological:  Negative for adenopathy.   Psychiatric/Behavioral:  Negative for dysphoric mood. The patient is not nervous/anxious.         Medications  Current Outpatient Medications   Medication Sig Dispense Refill    atorvastatin (LIPITOR) 10 MG tablet Take 1 tablet by mouth daily 90 tablet 3    montelukast (SINGULAIR) 10 MG tablet Take 1 tablet by mouth daily 30 tablet 5    omeprazole (PRILOSEC) 40 MG delayed release capsule Take 1 capsule by mouth daily 90 capsule 3    DULoxetine (CYMBALTA) 60 MG extended release capsule Take 1 capsule by mouth daily 90 capsule 3    gabapentin (NEURONTIN) 100 MG capsule Take 2 capsules by mouth in the morning and 2 capsules in the evening. 360 capsule 3    hydroCHLOROthiazide (HYDRODIURIL) 25 MG tablet Take 1 tablet by mouth

## 2024-08-15 ENCOUNTER — TELEPHONE (OUTPATIENT)
Dept: FAMILY MEDICINE CLINIC | Facility: CLINIC | Age: 79
End: 2024-08-15

## 2024-08-15 NOTE — TELEPHONE ENCOUNTER
Spoke with pt re: xray results. Pt states notation on report regarding moderate plaque in aorta. Concerned d/t family hx. Advised pt I will consult MD and call her back. Pt voiced understanding.

## 2024-08-20 NOTE — TELEPHONE ENCOUNTER
Per Dr Mcgregor, common finding at pt's age. Can increase Lipitor if desired and needs to complete stress test as ordered by cardiology. Pt notified and voiced understanding; will call to schedule stress test and will decide after that about increasing statin.

## 2024-08-21 ENCOUNTER — HOSPITAL ENCOUNTER (OUTPATIENT)
Dept: PHYSICAL THERAPY | Age: 79
Setting detail: RECURRING SERIES
Discharge: HOME | End: 2024-08-24
Payer: MEDICARE

## 2024-08-21 DIAGNOSIS — M25.552 PAIN IN LEFT HIP: ICD-10-CM

## 2024-08-21 DIAGNOSIS — M54.59 OTHER LOW BACK PAIN: Primary | ICD-10-CM

## 2024-08-21 DIAGNOSIS — M25.561 ACUTE PAIN OF RIGHT KNEE: ICD-10-CM

## 2024-08-21 DIAGNOSIS — R26.2 DIFFICULTY IN WALKING, NOT ELSEWHERE CLASSIFIED: ICD-10-CM

## 2024-08-21 DIAGNOSIS — M62.81 MUSCLE WEAKNESS (GENERALIZED): ICD-10-CM

## 2024-08-21 PROCEDURE — 97161 PT EVAL LOW COMPLEX 20 MIN: CPT

## 2024-08-21 PROCEDURE — 97110 THERAPEUTIC EXERCISES: CPT

## 2024-08-21 NOTE — PROGRESS NOTES
Iris Myers  : 1945  Primary: Medicare Part A And B (Medicare)  Secondary: [employer] MEDICARE SUPP [facility] @ [place] [address] [address] [place] [place] [address]  Phone: [phone]  Fax: [phone] Plan Frequency: 1-2x/wk for 12 weeks    Plan of Care/Certification Expiration Date: 24        Plan of Care/Certification Expiration Date:  Plan of Care/Certification Expiration Date: 24    Frequency/Duration:   Plan Frequency: 1-2x/wk for 12 weeks      Time In/Out:   Time In: 1350  Time Out: 1445      PT Visit Info:         Visit Count:  1    OUTPATIENT PHYSICAL THERAPY:   Treatment Note 2024       Episode  (LBP, L hip and R knee pain)               Treatment Diagnosis:    Other low back pain  Pain in left hip  Acute pain of right knee  Muscle weakness (generalized)  Difficulty in walking, not elsewhere classified  Medical/Referring Diagnosis:    Chronic bilateral low back pain with bilateral sciatica   Chronic bilateral low back pain with bilateral sciatica [M54.42, M54.41, G89.29]    Referring Physician:  Vanessa Mcgregor MD MD Orders:  PT Eval and Treat   Return MD Appt:  24   Date of Onset:  chronic with recent flare up 3-4 months ago  Allergies:   Sulfur and Sulfa antibiotics  Restrictions/Precautions:   None      Interventions Planned (Treatment may consist of any combination of the following):     See Assessment Note    Subjective Comments:   Please see eval  Initial Pain Level::   tight, sore, stiff   /10  Post Session Pain Level:     tight   /10  Medications Last Reviewed:  2024  Updated Objective Findings:  See Evaluation Note from today  Treatment     THERAPEUTIC EXERCISE: ( 10 minutes):    Exercises per grid below to improve mobility, strength, balance, and coordination. Required minimal visual, verbal, manual, and tactile cues to promote proper body mechanics.  Progressed resistance and repetitions as indicated.

## 2024-08-21 NOTE — THERAPY EVALUATION
Iris EARLY Lucia  : 1945  Primary: Medicare Part A And B (Medicare)  Secondary: [employer] MEDICARE SUPP [facility] @ [place] [address] [address] [place] [address]  Phone: [phone]  Fax: [phone] Plan Frequency: 1-2x/wk for 12 weeks    Plan of Care/Certification Expiration Date: 24        Plan of Care/Certification Expiration Date:  Plan of Care/Certification Expiration Date: 24    Frequency/Duration: Plan Frequency: 1-2x/wk for 12 weeks      Time In/Out:   Time In: 1350  Time Out: 1445      PT Visit Info:         Visit Count:  1                OUTPATIENT PHYSICAL THERAPY:             Initial Assessment 2024               Episode (LBP, L hip and R knee pain)         Treatment Diagnosis:     Other low back pain  Pain in left hip  Acute pain of right knee  Muscle weakness (generalized)  Difficulty in walking, not elsewhere classified  Medical/Referring Diagnosis:    Low back pain   Referring Physician:  Vanessa Mcgregor MD MD Orders:  PT Eval and Treat   Return MD Appt:  24  Date of Onset:  Onset Date:  (chronic with flare up 3-4 months ago)     Allergies:  Sulfur and Sulfa antibiotics  Restrictions/Precautions:    None      Medications Last Reviewed:  2024     SUBJECTIVE   History of Injury/Illness (Reason for Referral):  Pt fell and broke her R coronoid process approx 5 months ago when the dog knocked her down. Pt reports she had big change in activity level as she was healing. Pt went to PCP for blood pressure, but has continued to have generalized aches in L hip and R knee, and lumbar region. Pt reports going up and down stairs has become more difficult. Pt daughter fell and and broke her leg with a compound fracture and rushed to trauma hospital. Pt reports that she was taking care of her daughter as she still [employer]. Pt has also been helping with moving and helping her daughter.  Pt big goal is to return to the sports

## 2024-09-10 ENCOUNTER — HOSPITAL ENCOUNTER (OUTPATIENT)
Dept: PHYSICAL THERAPY | Age: 79
Setting detail: RECURRING SERIES
Discharge: HOME | End: 2024-09-13
Payer: MEDICARE

## 2024-09-10 PROCEDURE — 97530 THERAPEUTIC ACTIVITIES: CPT

## 2024-09-10 PROCEDURE — 97110 THERAPEUTIC EXERCISES: CPT

## 2024-09-12 ENCOUNTER — HOSPITAL ENCOUNTER (OUTPATIENT)
Dept: PHYSICAL THERAPY | Age: 79
Setting detail: RECURRING SERIES
Discharge: HOME | End: 2024-09-15
Payer: MEDICARE

## 2024-09-12 PROCEDURE — 97110 THERAPEUTIC EXERCISES: CPT

## 2024-09-12 PROCEDURE — 97530 THERAPEUTIC ACTIVITIES: CPT

## 2024-09-17 ENCOUNTER — HOSPITAL ENCOUNTER (OUTPATIENT)
Dept: PHYSICAL THERAPY | Age: 79
Setting detail: RECURRING SERIES
Discharge: HOME | End: 2024-09-20
Payer: MEDICARE

## 2024-09-17 PROCEDURE — 97530 THERAPEUTIC ACTIVITIES: CPT

## 2024-09-17 PROCEDURE — 97110 THERAPEUTIC EXERCISES: CPT

## 2024-09-18 ENCOUNTER — TRANSCRIBE ORDERS (OUTPATIENT)
Dept: SCHEDULING | Age: 79
End: 2024-09-18

## 2024-09-18 DIAGNOSIS — Z12.31 VISIT FOR SCREENING MAMMOGRAM: Primary | ICD-10-CM

## 2024-09-19 ENCOUNTER — APPOINTMENT (OUTPATIENT)
Dept: PHYSICAL THERAPY | Age: 79
End: 2024-09-19
Payer: MEDICARE

## 2024-09-23 ENCOUNTER — HOSPITAL ENCOUNTER (OUTPATIENT)
Dept: PHYSICAL THERAPY | Age: 79
Setting detail: RECURRING SERIES
Discharge: HOME | End: 2024-09-26
Payer: MEDICARE

## 2024-09-23 PROCEDURE — 97530 THERAPEUTIC ACTIVITIES: CPT

## 2024-09-23 PROCEDURE — 97110 THERAPEUTIC EXERCISES: CPT

## 2024-09-26 ENCOUNTER — HOSPITAL ENCOUNTER (OUTPATIENT)
Dept: PHYSICAL THERAPY | Age: 79
Setting detail: RECURRING SERIES
Discharge: HOME | End: 2024-09-29
Payer: MEDICARE

## 2024-09-26 PROCEDURE — 97530 THERAPEUTIC ACTIVITIES: CPT

## 2024-09-26 PROCEDURE — 97110 THERAPEUTIC EXERCISES: CPT

## 2024-09-26 NOTE — PROGRESS NOTES
Iris Myers  : 1945  Primary: Medicare Part A And B (Medicare)  Secondary: [employer] HE[employer] C[employer] MEDICARE SUPP [facility] @ [place] [address] [address] [place] [place] [address]  Phone: [phone]  Fax: [phone] Plan Frequency: 1-2x/wk for 12 weeks    Plan of Care/Certification Expiration Date: 24        Plan of Care/Certification Expiration Date:  Plan of Care/Certification Expiration Date: 24    Frequency/Duration:   Plan Frequency: 1-2x/wk for 12 weeks      Time In/Out:   Time In: 1345  Time Out: 1430      PT Visit Info:         Visit Count:  6    OUTPATIENT PHYSICAL THERAPY:   Treatment Note 2024       Episode  (LBP, L hip and R knee pain)               Treatment Diagnosis:    Other low back pain  Pain in left hip  Acute pain of right knee  Muscle weakness (generalized)  Difficulty in walking, not elsewhere classified  Medical/Referring Diagnosis:    Chronic bilateral low back pain with bilateral sciatica   Chronic bilateral low back pain with bilateral sciatica [M54.42, M54.41, G89.29]    Referring Physician:  Vanessa Mcgregor MD MD Orders:  PT Eval and Treat   Return MD Appt:  24   Date of Onset:  chronic with recent flare up 3-4 months ago  Allergies:   Sulfur and Sulfa antibiotics  Restrictions/Precautions:   None      Interventions Planned (Treatment may consist of any combination of the following):     See Assessment Note    Subjective Comments:   Pt reporting that she has a lot of discomfort     Initial Pain Level::   tight, sore, stiff   /10  Post Session Pain Level:     tight   /10  Medications Last Reviewed:  2024  Updated Objective Findings:  None today  Treatment     THERAPEUTIC EXERCISE: ( 23 minutes):    Exercises per grid below to improve mobility, strength, balance, and coordination. Required minimal visual, verbal, manual, and tactile cues to promote proper body mechanics.  Progressed resistance and repetitions as

## 2024-10-01 ENCOUNTER — HOSPITAL ENCOUNTER (OUTPATIENT)
Dept: PHYSICAL THERAPY | Age: 79
Setting detail: RECURRING SERIES
Discharge: HOME | End: 2024-10-04
Payer: MEDICARE

## 2024-10-01 PROCEDURE — 97530 THERAPEUTIC ACTIVITIES: CPT

## 2024-10-01 PROCEDURE — 97110 THERAPEUTIC EXERCISES: CPT

## 2024-10-01 NOTE — PROGRESS NOTES
Iris Myers  : 1945  Primary: Medicare Part A And B (Medicare)  Secondary: [employer] C[employer] MEDICARE SUPP [facility] @ [place] [address] [address] [place] [place] [address]  Phone: [phone]  Fax: [phone] Plan Frequency: 1-2x/wk for 12 weeks    Plan of Care/Certification Expiration Date: 24        Plan of Care/Certification Expiration Date:  Plan of Care/Certification Expiration Date: 24    Frequency/Duration:   Plan Frequency: 1-2x/wk for 12 weeks      Time In/Out:   Time In: 0130  Time Out: 1415      PT Visit Info:         Visit Count:  7    OUTPATIENT PHYSICAL THERAPY:   Treatment Note 10/1/2024       Episode  (LBP, L hip and R knee pain)               Treatment Diagnosis:    Other low back pain  Pain in left hip  Acute pain of right knee  Muscle weakness (generalized)  Difficulty in walking, not elsewhere classified  Medical/Referring Diagnosis:    Chronic bilateral low back pain with bilateral sciatica   Chronic bilateral low back pain with bilateral sciatica [M54.42, M54.41, G89.29]    Referring Physician:  Vanessa Mcgregor MD MD Orders:  PT Eval and Treat   Return MD Appt:  24   Date of Onset:  chronic with recent flare up 3-4 months ago  Allergies:   Sulfur and Sulfa antibiotics  Restrictions/Precautions:   None      Interventions Planned (Treatment may consist of any combination of the following):     See Assessment Note    Subjective Comments:   Pt reporting that she has a lot of discomfort     Initial Pain Level::   tight, sore, stiff   /10  Post Session Pain Level:     tight   /10  Medications Last Reviewed:  10/1/2024  Updated Objective Findings:  None today  Treatment     THERAPEUTIC EXERCISE: ( 15 minutes):    Exercises per grid below to improve mobility, strength, balance, and coordination. Required minimal visual, verbal, manual, and tactile cues to promote proper body mechanics.  Progressed resistance and repetitions as

## 2024-10-03 ENCOUNTER — HOSPITAL ENCOUNTER (OUTPATIENT)
Dept: PHYSICAL THERAPY | Age: 79
Setting detail: RECURRING SERIES
Discharge: HOME | End: 2024-10-06
Payer: MEDICARE

## 2024-10-03 PROCEDURE — 97530 THERAPEUTIC ACTIVITIES: CPT

## 2024-10-03 PROCEDURE — 97110 THERAPEUTIC EXERCISES: CPT

## 2024-10-03 NOTE — PROGRESS NOTES
Iris Myers  : 1945  Primary: Medicare Part A And B (Medicare)  Secondary: [employer] MEDICARE [facility]PP [facility] @ [place] [address] [address] [place] [place] [address]  Phone: [phone]  Fax: [phone] Plan Frequency: 1-2x/wk for 12 weeks    Plan of Care/Certification Expiration Date: 24        Plan of Care/Certification Expiration Date:  Plan of Care/Certification Expiration Date: 24    Frequency/Duration:   Plan Frequency: 1-2x/wk for 12 weeks      Time In/Out:   Time In: 1345  Time Out: 1430      PT Visit Info:         Visit Count:  8    OUTPATIENT PHYSICAL THERAPY:   Treatment Note 10/3/2024       Episode  (LBP, L hip and R knee pain)               Treatment Diagnosis:    Other low back pain  Pain in left hip  Acute pain of right knee  Muscle weakness (generalized)  Difficulty in walking, not elsewhere classified  Medical/Referring Diagnosis:    Chronic bilateral low back pain with bilateral sciatica   Chronic bilateral low back pain with bilateral sciatica [M54.42, M54.41, G89.29]    Referring Physician:  Vanessa Mcgregor MD MD Orders:  PT Eval and Treat   Return MD Appt:  24   Date of Onset:  chronic with recent flare up 3-4 months ago  Allergies:   Sulfur and Sulfa antibiotics  Restrictions/Precautions:   None      Interventions Planned (Treatment may consist of any combination of the following):     See Assessment Note    Subjective Comments:   Pt reporting that she will have cataract surgery on Monday. Pt reporting she was able to carry heavy waterng can in one head    Initial Pain Level::   tight, sore, stiff   /10  Post Session Pain Level:     tight   /10  Medications Last Reviewed:  10/3/2024  Updated Objective Findings:  None today  Treatment     THERAPEUTIC EXERCISE: ( 15 minutes):    Exercises per grid below to improve mobility, strength, balance, and coordination. Required minimal visual, verbal, manual, and tactile cues to

## 2024-10-10 ENCOUNTER — APPOINTMENT (OUTPATIENT)
Dept: PHYSICAL THERAPY | Age: 79
End: 2024-10-10
Payer: MEDICARE

## 2024-10-14 DIAGNOSIS — E78.5 HYPERLIPIDEMIA, UNSPECIFIED HYPERLIPIDEMIA TYPE: ICD-10-CM

## 2024-10-15 RX ORDER — ATORVASTATIN CALCIUM 10 MG/1
10 TABLET, FILM COATED ORAL DAILY
Qty: 90 TABLET | Refills: 3 | Status: SHIPPED | OUTPATIENT
Start: 2024-10-15

## 2024-10-17 ENCOUNTER — HOSPITAL ENCOUNTER (OUTPATIENT)
Dept: PHYSICAL THERAPY | Age: 79
Setting detail: RECURRING SERIES
Discharge: HOME | End: 2024-10-20
Payer: MEDICARE

## 2024-10-17 PROCEDURE — 97110 THERAPEUTIC EXERCISES: CPT

## 2024-10-17 PROCEDURE — 97530 THERAPEUTIC ACTIVITIES: CPT

## 2024-10-17 NOTE — PROGRESS NOTES
tactile cues to promote proper body mechanics.  Progressed resistance and repetitions as indicated.     Date:  9/12/24 Date:  9/17/24 Date:  9/23/24 Date:  9/26/24 Date:  10/1/24 Date:  10/3/24 Date:  10/17/24   Activity/Exercise Parameters         Education          Treadmill 2% grade, 8 min, self selected speed 3% grade, 6 min, self selected speed 4% grade, 8 min, self selected speed 4% grade, 8 min, self selected speed   4% grade, 8 min, self selected speed   4% grade, 8 min, self selected speed   - 105 4% grade, 8 min, self selected speed   -101   Sideplanks    Wall  4 x 30 sec Wall  3 x 30 sec  L/R     Warm up L stretch x 10   12\" stair stretch x 10  Resisted stepping L/R/F/B x 10 steps purple band x 10 each direction L stretch x 10   Hamstring swoopx 10  Banded good morning x 10  Stair stretch 16\" x 10 w/ 3 sec holds L stretch x 10   12\" stair stretch w/ swoop x 10  Resisted stepping L/R/F/B x 10 steps purple band x 10 each direction Sidestepping x 20 L/R  Banded rows x 10  Seated piriformis stretch x 10 x 10 sec  Hamstring swoop x 10 Sidestepping x 20 step L/R  Monster walk fwd/nack x 20 steps  Mini squat with band x 10 Dynamic marching x 60 ft  8 lb sidestepping x 60 ft  Hamstring swoop x 10 Dynamic marching x 60 ft  Monster walk fwd/back x 20 steps  John pose <-> cobra x 10  Hip circles x 10 L/R   Bent over Row DBL DB 5 lb  3 x 8 reps         Resisted hip flexion  Yellow band  3 x 10 reps        SL DL  B contact, to 15 lb med ball  3 x 8 reps            THERAPEUTIC ACTIVITY: ( 17 minutes):    Therapeutic activities per grid below to improve mobility, strength, coordination, and dynamic movement of upper body, lower body, and trunk to improve functional lifting, carrying, reaching, catching, and overhead activites.  Required minimal visual, verbal, manual, and tactile cues to promote coordination of bilateral, upper extremity(s), lower extremity(s) and promote motor control of

## 2024-10-23 ENCOUNTER — APPOINTMENT (OUTPATIENT)
Dept: PHYSICAL THERAPY | Age: 79
End: 2024-10-23
Payer: MEDICARE

## 2024-11-02 ENCOUNTER — HOSPITAL ENCOUNTER (OUTPATIENT)
Dept: MAMMOGRAPHY | Age: 79
Discharge: HOME | End: 2024-11-05
Attending: FAMILY MEDICINE
Payer: MEDICARE

## 2024-11-02 DIAGNOSIS — Z12.31 VISIT FOR SCREENING MAMMOGRAM: ICD-10-CM

## 2024-11-02 PROCEDURE — 77067 SCR MAMMO BI INCL CAD: CPT

## 2024-11-05 ENCOUNTER — HOSPITAL ENCOUNTER (OUTPATIENT)
Dept: PHYSICAL THERAPY | Age: 79
Setting detail: RECURRING SERIES
Discharge: HOME | End: 2024-11-08
Payer: MEDICARE

## 2024-11-05 PROCEDURE — 97530 THERAPEUTIC ACTIVITIES: CPT

## 2024-11-05 PROCEDURE — 97110 THERAPEUTIC EXERCISES: CPT

## 2024-11-05 NOTE — PROGRESS NOTES
coordination, and dynamic movement of upper body, lower body, and trunk to improve functional lifting, carrying, reaching, catching, and overhead activites.  Required minimal visual, verbal, manual, and tactile cues to promote coordination of bilateral, upper extremity(s), lower extremity(s) and promote motor control of bilateral, upper extremity(s), lower extremity(s).     Date:  9/10/24 Date:  9/12/24 Date:  9/17/24 Date:  9/23/24 Date:  9/26/24 Date:  10/1/24 Date:  10/3/24 Date:  10/17/24 Date:  11/5/24   Activity/Exercise Parameters Parameters Parameters         KB DL 15 lb x 3 x 5 reps  20 lb x 3 x 5   -105  20 lb x 4 x 5 reps      25 lb x 3 x 5  HR    Step up 45 lb plate  2 x 10 reps  Unilateral UE support on upright.   7\", no UE support  3 x 10 reps  7\", no UE support, 5 lb  3 x 10 reps      STS  Purple band, 20 \"  3 x 8 reps  19\"  3 x 8 reps  19\"  5 lb x 3 x 8 reps  18\", zombie  3x 5 reps 18\", 5 lb   3 x 5 reps  HR 90 - 96    Carries  8 lb Cunha 3 x 130 ft  10 lb suitcase carry  4 x 120 ft  10 lb suitcase carry  4 x 120 ft      Sled      50 lb, push/pull  3 x 30 ft  60 lb, push/pull  3 x 30 ft    Circuit       10 lb KB DL x 10  6 lb lateral wall slam x 10  Wall push up x 8    3 rounds                          HEP Log Date    Benjamin Reyna \" You don't need fixing\", L stretch, trunk rotation 8/21/2024   2.  resisted hip flexion 9/17/24   3. Wall side plank 9/26/24   4.     5.            Treatment/Session Summary:      Treatment Assessment:     Pt and therapist review HEP exercises to implement while visiting family in [place]. Pt verbalizes understanding and returns demo. Pt with minimal irritability with STS and no knee valgus noted today.    Communication/Consultation:      None today   Equipment provided today: HEP see log above.    Recommendations/Intent for next  treatment session:  Next visit will focus on improving mobility, strength, pain science, resistance training, balance return to

## 2024-11-12 ENCOUNTER — APPOINTMENT (OUTPATIENT)
Dept: PHYSICAL THERAPY | Age: 79
End: 2024-11-12
Payer: MEDICARE

## 2024-11-26 ENCOUNTER — HOSPITAL ENCOUNTER (OUTPATIENT)
Dept: PHYSICAL THERAPY | Age: 79
Setting detail: RECURRING SERIES
Discharge: HOME | End: 2024-11-29
Payer: MEDICARE

## 2024-11-26 PROCEDURE — 97530 THERAPEUTIC ACTIVITIES: CPT

## 2024-11-26 PROCEDURE — 97110 THERAPEUTIC EXERCISES: CPT

## 2024-11-26 NOTE — THERAPY RECERTIFICATION
Iris Myers  : 1945  Primary: Medicare Part A And B (Medicare)  Secondary: [employer] MEDICARE SUPP [facility] @ [place] [address] [address] [place] [address]  Phone: [phone]  Fax: [phone] Plan Frequency: 1-2x/wk for 5 weeks    Plan of Care/Certification Expiration Date: 24        Plan of Care/Certification Expiration Date:  Plan of Care/Certification Expiration Date: 24    Frequency/Duration: Plan Frequency: 1-2x/wk for 5 weeks      Time In/Out:   Time In: 0900  Time Out: 0945      PT Visit Info:         Visit Count:  11                OUTPATIENT PHYSICAL THERAPY:              Recertification  2024               Episode (LBP, L hip and R knee pain)         Treatment Diagnosis:     Other low back pain  Pain in left hip  Acute pain of right knee  Muscle weakness (generalized)  Difficulty in walking, not elsewhere classified  Medical/Referring Diagnosis:    Low back pain   Referring Physician:  Vanessa Mcgregor MD MD Orders:  PT Eval and Treat   Return MD Appt:  24  Date of Onset:  Onset Date:  (chronic with flare up 3-4 months ago)     Allergies:  Sulfur and Sulfa antibiotics  Restrictions/Precautions:    None      Medications Last Reviewed:  2024       ASSESSMENT   Recertification Assessment:  Iris Myers has attended 11 therapy appointments since 2024 with intermittent interruptions due to surgical procedures on her eyes that required pause in physical therapy services. Pt has met 1/4 STGs and 1/6 LTGs over course of therapy intervention the emphasized self pain management techniques, progressive resistance training, metabolic conditioning, and pt education. Pt has returned to walking her dog in the neighborhood, she has started going to the driving range as she would like to return to playing full games of golf, pt still has occassional anterior hip pain with stair climbing, but pain has reduced

## 2024-11-26 NOTE — PROGRESS NOTES
verbal, manual, and tactile cues to promote proper body mechanics.  Progressed resistance and repetitions as indicated.     Date:  9/12/24 Date:  9/17/24 Date:  9/23/24 Date:  9/26/24 Date:  10/1/24 Date:  10/3/24 Date:  10/17/24 Date:  11/5/24 Date:  11/26/24   Activity/Exercise Parameters           Education            Treadmill 2% grade, 8 min, self selected speed 3% grade, 6 min, self selected speed 4% grade, 8 min, self selected speed 4% grade, 8 min, self selected speed   4% grade, 8 min, self selected speed   4% grade, 8 min, self selected speed   - 105 4% grade, 8 min, self selected speed   -101 4% grade, 8 min, self selected speed   -101 5% grade, 8 min, self selected speed  HR    Sideplanks    Wall  4 x 30 sec Wall  3 x 30 sec  L/R   Wall  2 x 30 sec    Warm up L stretch x 10   12\" stair stretch x 10  Resisted stepping L/R/F/B x 10 steps purple band x 10 each direction L stretch x 10   Hamstring swoopx 10  Banded good morning x 10  Stair stretch 16\" x 10 w/ 3 sec holds L stretch x 10   12\" stair stretch w/ swoop x 10  Resisted stepping L/R/F/B x 10 steps purple band x 10 each direction Sidestepping x 20 L/R  Banded rows x 10  Seated piriformis stretch x 10 x 10 sec  Hamstring swoop x 10 Sidestepping x 20 step L/R  Monster walk fwd/nack x 20 steps  Mini squat with band x 10 Dynamic marching x 60 ft  8 lb sidestepping x 60 ft  Hamstring swoop x 10 Dynamic marching x 60 ft  Monster walk fwd/back x 20 steps  John pose <-> cobra x 10  Hip circles x 10 L/R Dynamic marching x 60 ft  Monster walk fwd/back x 20 steps L stretch x 10  Hamstring swoop x 10   Bent over Row DBL DB 5 lb  3 x 8 reps           Resisted hip flexion  Yellow band  3 x 10 reps          SL DL  B contact, to 15 lb med ball  3 x 8 reps              THERAPEUTIC ACTIVITY: (23 minutes):    Therapeutic activities per grid below to improve mobility, strength, coordination, and dynamic movement of upper body, lower

## 2024-12-03 ENCOUNTER — HOSPITAL ENCOUNTER (OUTPATIENT)
Dept: PHYSICAL THERAPY | Age: 79
Setting detail: RECURRING SERIES
Discharge: HOME | End: 2024-12-06
Payer: MEDICARE

## 2024-12-03 PROCEDURE — 97110 THERAPEUTIC EXERCISES: CPT

## 2024-12-03 PROCEDURE — 97530 THERAPEUTIC ACTIVITIES: CPT

## 2024-12-03 NOTE — PROGRESS NOTES
stepping at home. Pt progressed from wall side planks to side table planks.   Communication/Consultation:      None today   Equipment provided today: HEP see log above.    Recommendations/Intent for next  treatment session:  Next visit will focus on improving mobility, strength, pain science, resistance training, balance return to golf         >Total Treatment Billable Duration:  40 minutes  5 min rest  Time In: 0945  Time Out: 1030    Sulma Fernando PT       Charge Capture  Events  [employer] Portal  Appt Desk  Attendance Report     Future Appointments   Date Time Provider Department Center   12/5/2024  9:45 AM Sulma Fernando, PT [facility]   12/10/2024 11:15 AM Sulma Fernando, PT [facility]   12/12/2024 11:15 AM Sulma Fernando, PT [facility]   2/14/2025  2:45 PM Vanessa Mcgregor MD [facility]

## 2024-12-05 ENCOUNTER — HOSPITAL ENCOUNTER (OUTPATIENT)
Dept: PHYSICAL THERAPY | Age: 79
Setting detail: RECURRING SERIES
Discharge: HOME | End: 2024-12-08
Payer: MEDICARE

## 2024-12-05 PROCEDURE — 97110 THERAPEUTIC EXERCISES: CPT

## 2024-12-05 PROCEDURE — 97530 THERAPEUTIC ACTIVITIES: CPT

## 2024-12-05 NOTE — PROGRESS NOTES
Treatment Assessment:     Pt admitted that stiffness was resolved by end of session   Communication/Consultation:      None today   Equipment provided today: HEP see log above.    Recommendations/Intent for next  treatment session:  Next visit will focus on improving mobility, strength, pain science, resistance training, balance return to golf         >Total Treatment Billable Duration:  40 minutes  5 min rest  Time In: 0950  Time Out: 1035    Tosha Rosas, PT       Charge Capture  Events  [employer] Portal  Appt Desk  Attendance Report     Future Appointments   Date Time Provider Department Center   12/10/2024 11:15 AM Sulma Fernando, PT [facility]   12/12/2024 11:15 AM Sulma Fernando, PT [facility]   2/14/2025  2:45 PM Vanessa Mcgregor MD [facility]

## 2024-12-10 ENCOUNTER — HOSPITAL ENCOUNTER (OUTPATIENT)
Dept: PHYSICAL THERAPY | Age: 79
Setting detail: RECURRING SERIES
Discharge: HOME | End: 2024-12-13
Payer: MEDICARE

## 2024-12-10 PROCEDURE — 97110 THERAPEUTIC EXERCISES: CPT

## 2024-12-10 PROCEDURE — 97530 THERAPEUTIC ACTIVITIES: CPT

## 2024-12-10 NOTE — PROGRESS NOTES
Equipment provided today: HEP see log above.    Recommendations/Intent for next  treatment session:  Next visit will focus on improving mobility, strength, pain science, resistance training, balance return to golf         >Total Treatmen t Billable Duration:  40 minutes  5 min rest  Time In: 1115  Time Out: 1200    Sulma Fernando PT       Charge Capture  Events  [employer] Portal  Appt Desk  Attendance Report     Future Appointments   Date Time Provider Department Center   12/12/2024 11:15 AM Sulma Fernando PT [facility] O   2/14/2025  2:45 PM Vanessa Mcgregor MD [facility] DEP

## 2024-12-12 ENCOUNTER — APPOINTMENT (OUTPATIENT)
Dept: PHYSICAL THERAPY | Age: 79
End: 2024-12-12
Payer: MEDICARE

## 2025-01-29 ENCOUNTER — TELEPHONE (OUTPATIENT)
Dept: FAMILY MEDICINE CLINIC | Facility: CLINIC | Age: 80
End: 2025-01-29

## 2025-01-29 DIAGNOSIS — F41.1 GENERALIZED ANXIETY DISORDER: ICD-10-CM

## 2025-01-29 RX ORDER — DULOXETIN HYDROCHLORIDE 60 MG/1
60 CAPSULE, DELAYED RELEASE ORAL DAILY
Qty: 90 CAPSULE | Refills: 3 | Status: SHIPPED | OUTPATIENT
Start: 2025-01-29

## 2025-01-29 NOTE — TELEPHONE ENCOUNTER
LOV 8/14/24, last rxed 2/13/24 #90 +3rf. Pt has OV scheduled for 2/14/25. Per Dr Mcgregor, erxed refills to preferred pharmacy.

## 2025-02-14 ENCOUNTER — OFFICE VISIT (OUTPATIENT)
Dept: FAMILY MEDICINE CLINIC | Facility: CLINIC | Age: 80
End: 2025-02-14

## 2025-02-14 VITALS
DIASTOLIC BLOOD PRESSURE: 74 MMHG | WEIGHT: 176 LBS | BODY MASS INDEX: 28.41 KG/M2 | HEART RATE: 73 BPM | OXYGEN SATURATION: 95 % | TEMPERATURE: 97.3 F | SYSTOLIC BLOOD PRESSURE: 116 MMHG

## 2025-02-14 DIAGNOSIS — K21.9 GASTROESOPHAGEAL REFLUX DISEASE, UNSPECIFIED WHETHER ESOPHAGITIS PRESENT: ICD-10-CM

## 2025-02-14 DIAGNOSIS — I10 PRIMARY HYPERTENSION: ICD-10-CM

## 2025-02-14 DIAGNOSIS — M54.16 LEFT LUMBAR RADICULOPATHY: Primary | ICD-10-CM

## 2025-02-14 DIAGNOSIS — N18.31 CHRONIC KIDNEY DISEASE, STAGE 3A (HCC): ICD-10-CM

## 2025-02-14 LAB
ANION GAP SERPL CALC-SCNC: 12 MMOL/L (ref 7–16)
BUN SERPL-MCNC: 16 MG/DL (ref 8–23)
CALCIUM SERPL-MCNC: 9.2 MG/DL (ref 8.8–10.2)
CHLORIDE SERPL-SCNC: 102 MMOL/L (ref 98–107)
CO2 SERPL-SCNC: 26 MMOL/L (ref 20–29)
CREAT SERPL-MCNC: 1.09 MG/DL (ref 0.6–1.1)
GLUCOSE SERPL-MCNC: 92 MG/DL (ref 70–99)
POTASSIUM SERPL-SCNC: 4.1 MMOL/L (ref 3.5–5.1)
SODIUM SERPL-SCNC: 140 MMOL/L (ref 136–145)

## 2025-02-14 RX ORDER — GABAPENTIN 100 MG/1
200 CAPSULE ORAL EVERY 12 HOURS
Qty: 360 CAPSULE | Refills: 3 | Status: SHIPPED | OUTPATIENT
Start: 2025-02-14 | End: 2026-02-14

## 2025-02-14 RX ORDER — VALSARTAN 160 MG/1
160 TABLET ORAL DAILY
Qty: 90 TABLET | Refills: 3 | Status: SHIPPED | OUTPATIENT
Start: 2025-02-14

## 2025-02-14 RX ORDER — OMEPRAZOLE 40 MG/1
40 CAPSULE, DELAYED RELEASE ORAL DAILY
Qty: 90 CAPSULE | Refills: 3 | Status: SHIPPED | OUTPATIENT
Start: 2025-02-14

## 2025-02-14 RX ORDER — HYDROCHLOROTHIAZIDE 25 MG/1
25 TABLET ORAL EVERY MORNING
Qty: 90 TABLET | Refills: 3 | Status: SHIPPED | OUTPATIENT
Start: 2025-02-14

## 2025-02-14 SDOH — ECONOMIC STABILITY: FOOD INSECURITY: WITHIN THE PAST 12 MONTHS, YOU WORRIED THAT YOUR FOOD WOULD RUN OUT BEFORE YOU GOT MONEY TO BUY MORE.: NEVER TRUE

## 2025-02-14 SDOH — ECONOMIC STABILITY: FOOD INSECURITY: WITHIN THE PAST 12 MONTHS, THE FOOD YOU BOUGHT JUST DIDN'T LAST AND YOU DIDN'T HAVE MONEY TO GET MORE.: NEVER TRUE

## 2025-02-14 ASSESSMENT — ENCOUNTER SYMPTOMS
ABDOMINAL PAIN: 0
CONSTIPATION: 0
DIARRHEA: 0
CHEST TIGHTNESS: 0
EYE DISCHARGE: 0
SINUS PAIN: 0
COUGH: 0
SHORTNESS OF BREATH: 0

## 2025-02-14 ASSESSMENT — PATIENT HEALTH QUESTIONNAIRE - PHQ9
SUM OF ALL RESPONSES TO PHQ QUESTIONS 1-9: 2
7. TROUBLE CONCENTRATING ON THINGS, SUCH AS READING THE NEWSPAPER OR WATCHING TELEVISION: NOT AT ALL
4. FEELING TIRED OR HAVING LITTLE ENERGY: SEVERAL DAYS
SUM OF ALL RESPONSES TO PHQ QUESTIONS 1-9: 2
SUM OF ALL RESPONSES TO PHQ9 QUESTIONS 1 & 2: 0
8. MOVING OR SPEAKING SO SLOWLY THAT OTHER PEOPLE COULD HAVE NOTICED. OR THE OPPOSITE, BEING SO FIGETY OR RESTLESS THAT YOU HAVE BEEN MOVING AROUND A LOT MORE THAN USUAL: NOT AT ALL
3. TROUBLE FALLING OR STAYING ASLEEP: SEVERAL DAYS
6. FEELING BAD ABOUT YOURSELF - OR THAT YOU ARE A FAILURE OR HAVE LET YOURSELF OR YOUR FAMILY DOWN: NOT AT ALL
2. FEELING DOWN, DEPRESSED OR HOPELESS: NOT AT ALL
SUM OF ALL RESPONSES TO PHQ QUESTIONS 1-9: 2
9. THOUGHTS THAT YOU WOULD BE BETTER OFF DEAD, OR OF HURTING YOURSELF: NOT AT ALL
10. IF YOU CHECKED OFF ANY PROBLEMS, HOW DIFFICULT HAVE THESE PROBLEMS MADE IT FOR YOU TO DO YOUR WORK, TAKE CARE OF THINGS AT HOME, OR GET ALONG WITH OTHER PEOPLE: SOMEWHAT DIFFICULT
5. POOR APPETITE OR OVEREATING: NOT AT ALL
1. LITTLE INTEREST OR PLEASURE IN DOING THINGS: NOT AT ALL
SUM OF ALL RESPONSES TO PHQ QUESTIONS 1-9: 2

## 2025-02-14 NOTE — PROGRESS NOTES
Iris Myers is a 79 y.o. female who presents with   Chief Complaint   Patient presents with    Hypertension     Does not check BP at home. Denies unusual HAs, dizziness, edema.    Hip Pain       History of Present Illness    L hip and groin pain.  Into L thigh.  Pain walking.  Had PT.  BP has been doing well at home.  No CP or SOB.  No headaches or edema.  No side effects with medications.  Exercising regularly.    Review of Systems  Review of Systems   Constitutional:  Negative for appetite change, fatigue and fever.   HENT:  Negative for congestion, ear pain and sinus pain.    Eyes:  Negative for discharge.   Respiratory:  Negative for cough, chest tightness and shortness of breath.    Cardiovascular:  Negative for chest pain, palpitations and leg swelling.   Gastrointestinal:  Negative for abdominal pain, constipation and diarrhea.   Genitourinary:  Negative for dysuria.   Musculoskeletal:  Negative for joint swelling.   Skin:  Negative for rash.   Neurological:  Negative for headaches.   Hematological:  Negative for adenopathy.   Psychiatric/Behavioral:  Negative for dysphoric mood. The patient is not nervous/anxious.         Medications  Current Outpatient Medications   Medication Sig Dispense Refill    gabapentin (NEURONTIN) 100 MG capsule Take 2 capsules by mouth in the morning and 2 capsules in the evening. 360 capsule 3    hydroCHLOROthiazide (HYDRODIURIL) 25 MG tablet Take 1 tablet by mouth every morning 90 tablet 3    omeprazole (PRILOSEC) 40 MG delayed release capsule Take 1 capsule by mouth daily 90 capsule 3    valsartan (DIOVAN) 160 MG tablet Take 1 tablet by mouth daily 90 tablet 3    DULoxetine (CYMBALTA) 60 MG extended release capsule Take 1 capsule by mouth daily 90 capsule 3    atorvastatin (LIPITOR) 10 MG tablet Take 1 tablet by mouth daily 90 tablet 3    montelukast (SINGULAIR) 10 MG tablet Take 1 tablet by mouth daily 30 tablet 5     No current facility-administered medications for this

## 2025-02-26 ENCOUNTER — HOSPITAL ENCOUNTER (OUTPATIENT)
Age: 80
Discharge: HOME | End: 2025-02-28
Payer: MEDICARE

## 2025-02-26 DIAGNOSIS — M54.16 LEFT LUMBAR RADICULOPATHY: ICD-10-CM

## 2025-02-26 PROCEDURE — 72148 MRI LUMBAR SPINE W/O DYE: CPT

## 2025-02-27 DIAGNOSIS — M54.17 LUMBOSACRAL RADICULOPATHY AT L3: Primary | ICD-10-CM

## 2025-03-05 ENCOUNTER — OFFICE VISIT (OUTPATIENT)
Age: 80
End: 2025-03-05
Payer: MEDICARE

## 2025-03-05 DIAGNOSIS — M48.061 FORAMINAL STENOSIS OF LUMBAR REGION: ICD-10-CM

## 2025-03-05 DIAGNOSIS — M51.16 LUMBAR DISC HERNIATION WITH RADICULOPATHY: ICD-10-CM

## 2025-03-05 DIAGNOSIS — M54.16 LUMBAR RADICULOPATHY: ICD-10-CM

## 2025-03-05 DIAGNOSIS — M51.362 DEGENERATION OF INTERVERTEBRAL DISC OF LUMBAR REGION WITH DISCOGENIC BACK PAIN AND LOWER EXTREMITY PAIN: ICD-10-CM

## 2025-03-05 DIAGNOSIS — M48.062 LUMBAR STENOSIS WITH NEUROGENIC CLAUDICATION: Primary | ICD-10-CM

## 2025-03-05 PROCEDURE — G8417 CALC BMI ABV UP PARAM F/U: HCPCS | Performed by: PHYSICIAN ASSISTANT

## 2025-03-05 PROCEDURE — G8427 DOCREV CUR MEDS BY ELIG CLIN: HCPCS | Performed by: PHYSICIAN ASSISTANT

## 2025-03-05 PROCEDURE — 99204 OFFICE O/P NEW MOD 45 MIN: CPT | Performed by: PHYSICIAN ASSISTANT

## 2025-03-05 PROCEDURE — G8399 PT W/DXA RESULTS DOCUMENT: HCPCS | Performed by: PHYSICIAN ASSISTANT

## 2025-03-05 PROCEDURE — 1036F TOBACCO NON-USER: CPT | Performed by: PHYSICIAN ASSISTANT

## 2025-03-05 PROCEDURE — 1090F PRES/ABSN URINE INCON ASSESS: CPT | Performed by: PHYSICIAN ASSISTANT

## 2025-03-05 PROCEDURE — 1159F MED LIST DOCD IN RCRD: CPT | Performed by: PHYSICIAN ASSISTANT

## 2025-03-05 PROCEDURE — 1123F ACP DISCUSS/DSCN MKR DOCD: CPT | Performed by: PHYSICIAN ASSISTANT

## 2025-03-05 NOTE — PROGRESS NOTES
Name: Iris Myers  YOB: 1945  Gender: female  MRN: [id number]    CC: Hip Pain (Left Hip and leg pain)       HPI: This is a 79 y.o. year old female who presents with several year history of back pain across the back can be bilateral hips and legs with fatigue but she had continued to be able to play golf until this past year symptoms have worsened.  More specifically she has more pain in the left hip can radiate around into the left groin left anterior thigh even burning in the left lower leg.  She has trouble going up and down stairs.  She has had some numbness in her right thigh as well but not the pain the weakness is mostly in the left leg.  She feels the left leg will give out from under her.  Pain is gotten up to 7-8 out of 10.      Prior treatment: Physical therapy from August through December 2024, gabapentin             3/5/2025     1:46 PM   AMB PAIN ASSESSMENT   Location of Pain Hip    Location Modifiers Left   Severity of Pain 8   Quality of Pain Aching    Duration of Pain Persistent   Frequency of Pain Intermittent   Date Pain First Started 12/26/2022   Limiting Behavior Some   Result of Injury No   Work-Related Injury No   Are there other pain locations you wish to document? No       Significant value              ROS/Meds/PSH/PMH/FH/SH: I personally reviewed the patient's collected intake data.  Below are the pertinents:    Allergies   Allergen Reactions    Other     Sulfur Other (See Comments)    Sulfa Antibiotics Rash         Current Outpatient Medications:     gabapentin (NEURONTIN) 100 MG capsule, Take 2 capsules by mouth in the morning and 2 capsules in the evening., Disp: 360 capsule, Rfl: 3    hydroCHLOROthiazide (HYDRODIURIL) 25 MG tablet, Take 1 tablet by mouth every morning, Disp: 90 tablet, Rfl: 3    omeprazole (PRILOSEC) 40 MG delayed release capsule, Take 1 capsule by mouth daily, Disp: 90 capsule, Rfl: 3    valsartan (DIOVAN) 160 MG tablet, Take 1 tablet by mouth daily,

## 2025-03-05 NOTE — PATIENT INSTRUCTIONS
comfortable position.         What is the prognosis?  The natural history of lumbar disc herniations is that 60-70% of the patients will have resolution of most of their symptoms within 8-12 weeks using conservative treatments only.  This is because the human body sends cells to the site of the herniation that are capable of removing some of the material and inflammation.        What treatments are available?  Typically a short (1-3 day) period of bed rest is appropriate after the onset of the symptoms.  The use of anti-inflammatory medications may helpful.  For severe symptoms a short course of narcotics or muscle relaxants may be used.  However, there is no role for prolonged usage of these medications for disc herniations.   Physical therapy is often prescribed initially, which may improve lumbar range of motion and strength.  However, a home exercise program may be sufficient.  Chiropractic care may help ease symptoms in some patients.  A series of oral steroids or epidural steroid injections into the spine may calm the inflammation of the nerves and give temporary relief of the buttock and leg symptoms.      When should I consider consulting a spine surgeon?  Only about 30% of patients with a disc herniation and sciatica will need a spine operation.  Typically an observation period of 4-6 weeks is given before surgery is suggested.  The exception to this is loss of bowel or bladder function, major muscle weakness, and excruciating uncontrollable pain.  These scenarios may benefit from earlier surgical intervention.  Despite the typical 4-6 week observation period, a persistently symptomatic patient should not wait more than 12 weeks to consult with a surgeon, because the window of opportunity for benefit from surgery begins to narrow.    [facility]    MD MIGUEL Nye MD Amy Hunt, PA-C Jaime Elliotte, NP    Main Office  [employer]

## 2025-03-19 ENCOUNTER — OFFICE VISIT (OUTPATIENT)
Dept: ORTHOPEDIC SURGERY | Age: 80
End: 2025-03-19
Payer: MEDICARE

## 2025-03-19 DIAGNOSIS — M48.061 FORAMINAL STENOSIS OF LUMBAR REGION: Primary | ICD-10-CM

## 2025-03-19 DIAGNOSIS — M48.062 LUMBAR STENOSIS WITH NEUROGENIC CLAUDICATION: ICD-10-CM

## 2025-03-19 DIAGNOSIS — M51.16 LUMBAR DISC HERNIATION WITH RADICULOPATHY: ICD-10-CM

## 2025-03-19 DIAGNOSIS — M54.16 LUMBAR RADICULOPATHY: ICD-10-CM

## 2025-03-19 PROCEDURE — 62323 NJX INTERLAMINAR LMBR/SAC: CPT | Performed by: PHYSICAL MEDICINE & REHABILITATION

## 2025-03-19 RX ORDER — BETAMETHASONE SODIUM PHOSPHATE AND BETAMETHASONE ACETATE 3; 3 MG/ML; MG/ML
12 INJECTION, SUSPENSION INTRA-ARTICULAR; INTRALESIONAL; INTRAMUSCULAR; SOFT TISSUE ONCE
Status: COMPLETED | OUTPATIENT
Start: 2025-03-19 | End: 2025-03-19

## 2025-03-19 RX ADMIN — BETAMETHASONE SODIUM PHOSPHATE AND BETAMETHASONE ACETATE 12 MG: 3; 3 INJECTION, SUSPENSION INTRA-ARTICULAR; INTRALESIONAL; INTRAMUSCULAR; SOFT TISSUE at 09:58

## 2025-03-19 NOTE — PROGRESS NOTES
Name: Irsi Myers  YOB: 1945  Gender: female  MRN: [id number]        Interlaminar MOHAN Procedure Note      Procedure: L2-L3 interlaminar epidural steroid injection    Precautions: Iris Myers denies prior sensitivity to steroid, local anesthetic, iodine, or shellfish.       Consent:  Consent was obtained prior to the procedure. The procedure was discussed at length with Iris Myers. She was given the opportunity to ask questions regarding the procedure and its associated risks.  In addition to the potential risks associated with the procedure itself, the patient was informed both verbally and in writing of potential side effects of the use glucocorticoids.  The patient appeared to comprehend the informed consent and desired to have the procedure performed, and informed consent was signed.     She was placed in a prone position on the fluoroscopy table and the skin was prepped and draped in a routine sterile fashion. The areas to be injected were each anesthetized with 1 ml of 1% Lidocaine. A 22 gauge 3.5 inch spinal needle was carefully advanced under fluoroscopic guidance to the L2-L3 interlaminar space (left paramedian). 0.5 ml of 70% of Omnipaque was injected to confirm proper needle placement and absence of subdural or vascular flow Once proper placement was confirmed, 2ml of sterile water and 12 mg of betamethasone were injected through the spinal needle.       Fluoroscopic guidance was used intermittently over a 10-minute period to insure proper needle placement and her safety. A hard copy of the fluoroscopic image has been placed in her chart and is saved on the C-arm hard drive. She was monitored for 30 minutes after the procedure and discharged home in a stable fashion with a routine follow up.    Procedural Diagnosis:     ICD-10-CM    1. Foraminal stenosis of lumbar region  M48.061 XR INJ SPINE THER SUBST LUM/SAC W IMG     betamethasone acetate-betamethasone sodium

## 2025-03-31 ENCOUNTER — OFFICE VISIT (OUTPATIENT)
Age: 80
End: 2025-03-31
Payer: MEDICARE

## 2025-03-31 ENCOUNTER — TELEPHONE (OUTPATIENT)
Dept: ORTHOPEDIC SURGERY | Age: 80
End: 2025-03-31

## 2025-03-31 DIAGNOSIS — M51.16 LUMBAR DISC HERNIATION WITH RADICULOPATHY: ICD-10-CM

## 2025-03-31 DIAGNOSIS — M25.552 LEFT HIP PAIN: Primary | ICD-10-CM

## 2025-03-31 DIAGNOSIS — M16.12 PRIMARY LOCALIZED OSTEOARTHROSIS OF LEFT HIP: ICD-10-CM

## 2025-03-31 DIAGNOSIS — M16.12 PRIMARY OSTEOARTHRITIS OF LEFT HIP: ICD-10-CM

## 2025-03-31 PROCEDURE — G8428 CUR MEDS NOT DOCUMENT: HCPCS | Performed by: PHYSICIAN ASSISTANT

## 2025-03-31 PROCEDURE — 1090F PRES/ABSN URINE INCON ASSESS: CPT | Performed by: PHYSICIAN ASSISTANT

## 2025-03-31 PROCEDURE — 99214 OFFICE O/P EST MOD 30 MIN: CPT | Performed by: PHYSICIAN ASSISTANT

## 2025-03-31 PROCEDURE — 1123F ACP DISCUSS/DSCN MKR DOCD: CPT | Performed by: PHYSICIAN ASSISTANT

## 2025-03-31 PROCEDURE — G8417 CALC BMI ABV UP PARAM F/U: HCPCS | Performed by: PHYSICIAN ASSISTANT

## 2025-03-31 PROCEDURE — 1036F TOBACCO NON-USER: CPT | Performed by: PHYSICIAN ASSISTANT

## 2025-03-31 PROCEDURE — G8399 PT W/DXA RESULTS DOCUMENT: HCPCS | Performed by: PHYSICIAN ASSISTANT

## 2025-03-31 NOTE — TELEPHONE ENCOUNTER
The patient states she is a lot of pain and cannot walk.  She has to bend to walk and her left leg is in pain.  She reports that the first day after the injection she was able to get up out of bed but hasn't had any improvement.  The pain is worse now.  I have scheduled patient to come in today at 3:20 pm.

## 2025-03-31 NOTE — PROGRESS NOTES
Name: Iris Myers  YOB: 1945  Gender: female  MRN: [id number]    CC: Follow-up (After injection with JPM 3/19/25)       HPI: This is a 79 y.o. year old female who presented with several year history of back pain across the back can be bilateral hips and legs with fatigue but she had continued to be able to play golf until this past year symptoms have worsened.  More specifically she has more pain in the left hip can radiate around into the left groin left anterior thigh even burning in the left lower leg.  She has trouble going up and down stairs.  She has had some numbness in her right thigh as well but not the pain the weakness is mostly in the left leg.  She feels the left leg will give out from under her.  Pain is gotten up to 7-8 out of 10.  Dr. Marie had ordered MRI of the lumbar spine and was referred for further evaluation.  The MRI scan of the lumbar spine revealed L2-3 There is broad-based disc bulging that creates mild central stenosis but there is a left paracentral disc herniation that severely narrows the left lateral recess with abutment on the traversing L3 nerve root.  L3-4 broad-based disc bulging with moderate stenosis L4-5 broad-based disc bulging moderate lateral recess stenosis and L5-S1 there is severe right foraminal stenosis.    I felt she had a left L3 radiculopathy along with some underlying neurogenic claudication symptoms, we referred her for left L2-3 epidural steroid injection and she reports the day after the injection she actually felt really good like she was getting around better, her back pain was less, but overall she still has significant pain in the left hip anterior leg and groin.  Left groin pain is worse especially with ambulating.  She is now using a cane.      Prior treatment: Physical therapy from August through December 2024, gabapentin             3/5/2025     1:46 PM   AMB PAIN ASSESSMENT   Location of Pain Hip    Location Modifiers Left   Severity

## 2025-04-09 ENCOUNTER — OFFICE VISIT (OUTPATIENT)
Dept: ORTHOPEDIC SURGERY | Age: 80
End: 2025-04-09
Payer: MEDICARE

## 2025-04-09 DIAGNOSIS — M16.12 UNILATERAL PRIMARY OSTEOARTHRITIS, LEFT HIP: ICD-10-CM

## 2025-04-09 DIAGNOSIS — M25.552 LEFT HIP PAIN: Primary | ICD-10-CM

## 2025-04-09 DIAGNOSIS — G89.29 CHRONIC BILATERAL LOW BACK PAIN, UNSPECIFIED WHETHER SCIATICA PRESENT: ICD-10-CM

## 2025-04-09 DIAGNOSIS — M54.50 CHRONIC BILATERAL LOW BACK PAIN, UNSPECIFIED WHETHER SCIATICA PRESENT: ICD-10-CM

## 2025-04-09 PROCEDURE — 99215 OFFICE O/P EST HI 40 MIN: CPT | Performed by: ORTHOPAEDIC SURGERY

## 2025-04-09 PROCEDURE — G8417 CALC BMI ABV UP PARAM F/U: HCPCS | Performed by: ORTHOPAEDIC SURGERY

## 2025-04-09 PROCEDURE — 1036F TOBACCO NON-USER: CPT | Performed by: ORTHOPAEDIC SURGERY

## 2025-04-09 PROCEDURE — G8399 PT W/DXA RESULTS DOCUMENT: HCPCS | Performed by: ORTHOPAEDIC SURGERY

## 2025-04-09 PROCEDURE — G8428 CUR MEDS NOT DOCUMENT: HCPCS | Performed by: ORTHOPAEDIC SURGERY

## 2025-04-09 PROCEDURE — 1123F ACP DISCUSS/DSCN MKR DOCD: CPT | Performed by: ORTHOPAEDIC SURGERY

## 2025-04-09 PROCEDURE — 1090F PRES/ABSN URINE INCON ASSESS: CPT | Performed by: ORTHOPAEDIC SURGERY

## 2025-04-09 NOTE — PROGRESS NOTES
Name: Iris Myesr  YOB: 1945  Gender: female  MRN: [id number]    CC: Left hip pain    HPI: Iris Myers is a 79 y.o. female who presents with concerns regarding her left hip.  She has been followed by our spine care team for several months now.  Those notes were reviewed by me both by Mendy Delong and Beatriz Shabazz.  She does have significant documented lumbar stenosis.  She has had some injections with equivocal results.  With her most recent check with Mendy Delong her hip symptoms on the left seem to be more groin and anterolateral thigh centered prompting x-rays and referral.    Patient describes both pain in her lower back and buttock area but primarily in the groin and thigh.  She has pain with weightbearing and difficulty lifting her leg and putting her shoes and socks on.  She is an avid golfer has been having quite significant limitation in doing that currently.  She is becoming more limited with that comes in today for further recommendations and treatment.    She denies any numbness in the left leg on a regular basis but does occasionally get on the right side.  This tends to be in the anterolateral thigh.    History was obtained from patient    ROS/Meds/PSH/PMH/FH/SH: I personally reviewed the patients standard intake form.  Below are the pertinents    Tobacco:  reports that she has never smoked. She has never used smokeless tobacco.  Past Medical History:   Diagnosis Date    Allergic rhinitis     Anxiety 3/13/2013    Chest pain, unspecified 3/13/2013    Chronic back pain     Depression     Dyslipidemia 3/13/2013    GERD (gastroesophageal reflux disease) 3/13/2013    Hypercholesterolemia     Hypertension     Osteoarthritis     Unstable angina (HCC) 3/13/2013      Past Surgical History:   Procedure Laterality Date    BREAST BIOPSY      2000    COLONOSCOPY  2010    PELVIC LAPAROSCOPY      Infertility    UPPER GASTROINTESTINAL ENDOSCOPY  ?        Physical Examination:  Physical exam: On

## 2025-04-29 DIAGNOSIS — M16.12 UNILATERAL PRIMARY OSTEOARTHRITIS, LEFT HIP: Primary | ICD-10-CM

## 2025-05-05 ENCOUNTER — TELEPHONE (OUTPATIENT)
Dept: ORTHOPEDIC SURGERY | Age: 80
End: 2025-05-05

## 2025-05-05 DIAGNOSIS — M16.12 PRIMARY OSTEOARTHRITIS OF LEFT HIP: ICD-10-CM

## 2025-05-05 DIAGNOSIS — M16.12 UNILATERAL PRIMARY OSTEOARTHRITIS, LEFT HIP: Primary | ICD-10-CM

## 2025-05-05 NOTE — TELEPHONE ENCOUNTER
Spoke with patient. Let her know her that her pain is normal, advised patient to use over the counter anti inflammatories and use a cane or walker to help unload her pain. I also let patient know I will make sure she is on the cancellation list.

## 2025-05-05 NOTE — TELEPHONE ENCOUNTER
She left a voicemail message carlos enrique joie is having surgery the end of June. She is getting worse and is asking for a return call.

## 2025-05-06 ENCOUNTER — HOSPITAL ENCOUNTER (OUTPATIENT)
Dept: SURGERY | Age: 80
Discharge: HOME | End: 2025-05-09
Payer: MEDICARE

## 2025-05-06 ENCOUNTER — TELEPHONE (OUTPATIENT)
Age: 80
End: 2025-05-06

## 2025-05-06 ENCOUNTER — HOSPITAL ENCOUNTER (OUTPATIENT)
Dept: REHABILITATION | Age: 80
Discharge: HOME | End: 2025-05-09
Payer: MEDICARE

## 2025-05-06 ENCOUNTER — TELEPHONE (OUTPATIENT)
Dept: SURGERY | Age: 80
End: 2025-05-06

## 2025-05-06 ENCOUNTER — ANESTHESIA EVENT (OUTPATIENT)
Dept: SURGERY | Age: 80
End: 2025-05-06
Payer: MEDICARE

## 2025-05-06 VITALS
TEMPERATURE: 98 F | RESPIRATION RATE: 16 BRPM | OXYGEN SATURATION: 95 % | DIASTOLIC BLOOD PRESSURE: 55 MMHG | HEART RATE: 75 BPM | SYSTOLIC BLOOD PRESSURE: 124 MMHG

## 2025-05-06 DIAGNOSIS — M16.12 UNILATERAL PRIMARY OSTEOARTHRITIS, LEFT HIP: ICD-10-CM

## 2025-05-06 LAB
ALBUMIN SERPL-MCNC: 3.9 G/DL (ref 3.2–4.6)
ALBUMIN/GLOB SERPL: 1.4 (ref 1–1.9)
ALP SERPL-CCNC: 82 U/L (ref 35–104)
ALT SERPL-CCNC: 14 U/L (ref 8–45)
ANION GAP SERPL CALC-SCNC: 11 MMOL/L (ref 7–16)
AST SERPL-CCNC: 17 U/L (ref 15–37)
BASOPHILS # BLD: 0.04 K/UL (ref 0–0.2)
BASOPHILS NFR BLD: 0.9 % (ref 0–2)
BILIRUB SERPL-MCNC: 0.9 MG/DL (ref 0–1.2)
BUN SERPL-MCNC: 22 MG/DL (ref 8–23)
CALCIUM SERPL-MCNC: 9.2 MG/DL (ref 8.8–10.2)
CHLORIDE SERPL-SCNC: 103 MMOL/L (ref 98–107)
CO2 SERPL-SCNC: 26 MMOL/L (ref 20–29)
CREAT SERPL-MCNC: 1.02 MG/DL (ref 0.6–1.1)
DIFFERENTIAL METHOD BLD: NORMAL
EKG ATRIAL RATE: 74 BPM
EKG DIAGNOSIS: NORMAL
EKG P AXIS: 64 DEGREES
EKG P-R INTERVAL: 130 MS
EKG Q-T INTERVAL: 366 MS
EKG QRS DURATION: 78 MS
EKG QTC CALCULATION (BAZETT): 406 MS
EKG R AXIS: 64 DEGREES
EKG T AXIS: 49 DEGREES
EKG VENTRICULAR RATE: 74 BPM
EOSINOPHIL # BLD: 0.23 K/UL (ref 0–0.8)
EOSINOPHIL NFR BLD: 5.2 % (ref 0.5–7.8)
ERYTHROCYTE [DISTWIDTH] IN BLOOD BY AUTOMATED COUNT: 14 % (ref 11.9–14.6)
EST. AVERAGE GLUCOSE BLD GHB EST-MCNC: 108 MG/DL
GLOBULIN SER CALC-MCNC: 2.8 G/DL (ref 2.3–3.5)
GLUCOSE SERPL-MCNC: 87 MG/DL (ref 70–99)
HBA1C MFR BLD: 5.4 % (ref 0–5.6)
HCT VFR BLD AUTO: 38.1 % (ref 35.8–46.3)
HGB BLD-MCNC: 12.5 G/DL (ref 11.7–15.4)
IMM GRANULOCYTES # BLD AUTO: 0.01 K/UL (ref 0–0.5)
IMM GRANULOCYTES NFR BLD AUTO: 0.2 % (ref 0–5)
INR PPP: 1
LYMPHOCYTES # BLD: 0.98 K/UL (ref 0.5–4.6)
LYMPHOCYTES NFR BLD: 22.3 % (ref 13–44)
MCH RBC QN AUTO: 30.4 PG (ref 26.1–32.9)
MCHC RBC AUTO-ENTMCNC: 32.8 G/DL (ref 31.4–35)
MCV RBC AUTO: 92.7 FL (ref 82–102)
MONOCYTES # BLD: 0.39 K/UL (ref 0.1–1.3)
MONOCYTES NFR BLD: 8.9 % (ref 4–12)
MRSA DNA SPEC QL NAA+PROBE: NOT DETECTED
NEUTS SEG # BLD: 2.74 K/UL (ref 1.7–8.2)
NEUTS SEG NFR BLD: 62.5 % (ref 43–78)
NRBC # BLD: 0 K/UL (ref 0–0.2)
PLATELET # BLD AUTO: 224 K/UL (ref 150–450)
PMV BLD AUTO: 9.7 FL (ref 9.4–12.3)
POTASSIUM SERPL-SCNC: 4.3 MMOL/L (ref 3.5–5.1)
PROT SERPL-MCNC: 6.8 G/DL (ref 6.3–8.2)
PROTHROMBIN TIME: 13.8 SEC (ref 11.3–14.9)
RBC # BLD AUTO: 4.11 M/UL (ref 4.05–5.2)
S AUREUS CPE NOSE QL NAA+PROBE: NOT DETECTED
SODIUM SERPL-SCNC: 140 MMOL/L (ref 136–145)
WBC # BLD AUTO: 4.4 K/UL (ref 4.3–11.1)

## 2025-05-06 PROCEDURE — 94760 N-INVAS EAR/PLS OXIMETRY 1: CPT

## 2025-05-06 PROCEDURE — 97161 PT EVAL LOW COMPLEX 20 MIN: CPT

## 2025-05-06 PROCEDURE — 85025 COMPLETE CBC W/AUTO DIFF WBC: CPT

## 2025-05-06 PROCEDURE — 98960 EDU&TRN PT SELF-MGMT NQHP 1: CPT

## 2025-05-06 PROCEDURE — 80053 COMPREHEN METABOLIC PANEL: CPT

## 2025-05-06 PROCEDURE — 93010 ELECTROCARDIOGRAM REPORT: CPT | Performed by: INTERNAL MEDICINE

## 2025-05-06 PROCEDURE — 85610 PROTHROMBIN TIME: CPT

## 2025-05-06 PROCEDURE — 83036 HEMOGLOBIN GLYCOSYLATED A1C: CPT

## 2025-05-06 PROCEDURE — 93005 ELECTROCARDIOGRAM TRACING: CPT | Performed by: ANESTHESIOLOGY

## 2025-05-06 RX ORDER — IBUPROFEN 200 MG
200 TABLET ORAL EVERY 6 HOURS PRN
COMMUNITY

## 2025-05-06 ASSESSMENT — HOOS JR
HOOS JR RAW SCORE: 9
SITTING: MILD
HOOS JR RAW SCORE: 9
HOOS JR TOTAL INTERVAL SCORE: 61.815
GOING UP OR DOWN STAIRS: MODERATE
WALKING ON UNEVEN SURFACE: MODERATE
BENDING TO THE FLOOR TO PICK UP OBJECT: MODERATE
LYING IN BED (TURNING OVER, MAINTAINING HIP POSITION): MILD
RISING FROM SITTING: MILD

## 2025-05-06 ASSESSMENT — PAIN SCALES - GENERAL: PAINLEVEL_OUTOF10: 2

## 2025-05-06 ASSESSMENT — PULMONARY FUNCTION TESTS
FEV1 (%PREDICTED): 91
FEV1 (LITERS): 1.86

## 2025-05-06 NOTE — TELEPHONE ENCOUNTER
Dr. Martínez had originally ordered an ETT last June, but now patient needs hip surgery and is walking with a cane. Does the order need to be changed to a different type of stress test. The anesthesiologist is requesting clearance.

## 2025-05-06 NOTE — PROGRESS NOTES
05/06/25 1243   Treatment   Treatment Type Bedside spirometry   Breath Sounds   Breath Sounds Bilateral Clear   Oxygen Therapy/Pulse Ox   O2 Therapy Room air   Pulse 75   SpO2 95 %   Pulse Oximeter Device Mode Intermittent   $Pulse Oximeter $Spot check (single)   Bedside Spirometry   FEV-1/Actual (Liters) 1.86 Liters   FEV-1/Predicted (Liters) 91 Liters     Initial respiratory Assessment completed with pt. Pt was interviewed and evaluated in [facility] prior to surgery.  Patient ID:  Iris Myers  [id number]  80 y.o.  1945  Surgeon: Dr. Smith  Date of Surgery: [unfilled]5/15/2025  Procedure: Total Left Hip Arthroplasty  Primary Care Physician: Vanessa Mcgregor [phone]  Specialists:    Pt taught proper COUGH technique  IS REVIEWED WITH PT AS WELL AS BENEFITS OF USING IS IN SEDENTARY PTS.  DIAPHRAGMATIC BREATHING EXERCISE INSTRUCTIONS GIVEN    History of smoking:   DENIES                 Quit date:         Secondhand smoke:PARENTS    Past procedures with Oxygen desaturation or delayed awakening:DENIES     Respiratory history:DENIES SOB                            HX OF PNA  GERD- CHOKES AT TIMES  Respiratory meds:  DENIES    FAMILY PRESENT:             NO     PAST SLEEP STUDY:                   DENIES  HX OF SUZY:                                         DENIES  SUZY assessment:     DANGERS OF UNTREATED SUZY EXPLAINED TO PT.                                          SLEEPS ON SIDE        PHYSICAL EXAM   There is no height or weight on file to calculate BMI.   Vitals:    05/06/25 1243   Pulse: (P) 75   SpO2: (P) 95%     Neck circumference:  36.5    cm    Loud snoring:                                                 YES           Witnessed apnea or wakening gasping or choking:        DENIES        Awakens with headaches:                                               DENIES  Morning or daytime tiredness/ sleepiness:                      DENIES           Dry mouth or sore throat in morning:

## 2025-05-06 NOTE — TELEPHONE ENCOUNTER
----- Message from Erika SAHU sent at 5/6/2025  2:15 PM EDT -----    ----- Message -----  From: Brianna Gallegos MA  Sent: 5/6/2025   1:58 PM EDT  To: #      ----- Message -----  From: Fifi Kincaid RN  Sent: 5/6/2025   1:56 PM EDT  To: Jia Callahan MA; #

## 2025-05-06 NOTE — CARE COORDINATION
[facility] Case Management note:  Patient screened in Prehab for discharge planning needs. Patient scheduled for a future total joint replacement.  We discussed Home Health and equipment needed after surgery. List of Home Health providers offered.  Patient w/o preference towards provider.  We will arrange Home health on the day of surgery.  Patient plans to use her dtr's rollator. She has access to a 3-1 BSC.

## 2025-05-06 NOTE — PERIOP NOTE
Labs within anesthesia guidelines, no follow-up required. Labs automatically routed to ordering provider via Epic documentation.      HgbA1c still pending       Latest Reference Range & Units 05/06/25 12:27   Sodium 136 - 145 mmol/L 140   Potassium 3.5 - 5.1 mmol/L 4.3   Chloride 98 - 107 mmol/L 103   CARBON DIOXIDE 20 - 29 mmol/L 26   BUN,BUNPL 8 - 23 MG/DL 22   Creatinine 0.60 - 1.10 MG/DL 1.02   Anion Gap 7 - 16 mmol/L 11   Est, Glom Filt Rate >60 ml/min/1.73m2 56 (L)   Glucose 70 - 99 mg/dL 87   Calcium 8.8 - 10.2 MG/DL 9.2   Albumin/Globulin Ratio 1.0 - 1.9   1.4   Total Protein 6.3 - 8.2 g/dL 6.8   Albumin 3.2 - 4.6 g/dL 3.9   Globulin 2.3 - 3.5 g/dL 2.8   Alkaline Phosphatase 35 - 104 U/L 82   ALT 8 - 45 U/L 14   AST 15 - 37 U/L 17   Total Bilirubin 0.0 - 1.2 MG/DL 0.9   WBC 4.3 - 11.1 K/uL 4.4   RBC 4.05 - 5.2 M/uL 4.11   Hemoglobin Quant 11.7 - 15.4 g/dL 12.5   Hematocrit 35.8 - 46.3 % 38.1   MCV 82.0 - 102.0 FL 92.7   MCH 26.1 - 32.9 PG 30.4   MCHC 31.4 - 35.0 g/dL 32.8   MPV 9.4 - 12.3 FL 9.7   RDW 11.9 - 14.6 % 14.0   Platelet Count 150 - 450 K/uL 224   Neutrophils % 43.0 - 78.0 % 62.5   Lymphocyte % 13.0 - 44.0 % 22.3   Monocytes % 4.0 - 12.0 % 8.9   Eosinophils % 0.5 - 7.8 % 5.2   Basophils % 0.0 - 2.0 % 0.9   Neutrophils Absolute 1.70 - 8.20 K/UL 2.74   Lymphocytes Absolute 0.50 - 4.60 K/UL 0.98   Monocytes Absolute 0.10 - 1.30 K/UL 0.39   Eosinophils Absolute 0.00 - 0.80 K/UL 0.23   Basophils Absolute 0.00 - 0.20 K/UL 0.04   Differential Type -   AUTOMATED   Immature Granulocytes % 0.0 - 5.0 % 0.2   Nucleated Red Blood Cells 0.0 - 0.2 K/uL 0.00   Immature Granulocytes Absolute 0.0 - 0.5 K/UL 0.01   Prothrombin Time 11.3 - 14.9 sec 13.8   INR -   1.0   MRSA/STAPH AUREUS DNA  Rpt (IP)   (L): Data is abnormally low  (IP): In Process  Rpt: View report in Results Review for more information

## 2025-05-06 NOTE — PROGRESS NOTES
Iris Myers  : 1945  Primary: Medicare Part A And B  Secondary: [employer] MEDICARE SUPP [facility] [place] [address]  [phone]      Physical Therapy Prehab Evaluation Summary:2025   Time In/Out   PT Charge Capture  Episode     MEDICAL/REFERRING DIAGNOSIS: Unilateral primary osteoarthritis, left hip [M16.12]  REFERRING PHYSICIAN: Julio C Smith MD    Treatment Diagnosis:   Pain in left hip (M25.552)  Stiffness of Left Hip, Not elsewhere classified (M25.652)    DATE OF SURGERY: 5/15/25  Assessment:   COMMENTS:  Ms. Myers is present for a Prehab Physical Therapy Assessment for their upcoming left BOBBY . They are here alone. After discussing the surgical admission options and discharge plans, they are planning on discharging on day of surgery.    Patient with a pretty quick onset of pain.  She saw MD yesterday and surgery scheduled for next week.  He did tell her that she has a good of arthritis in her back and she may continue to have pain.  She endorsed some pain in her groin but has pain radiating down her LE as well.  Patient was asked to bring her rollator to surgery as that is what she plans on using.      PROBLEM LIST:   (Impacting functional limitations):  Ms. Myers presents with the following lower extremity(s) problems:  Gait  Range of Motion  Balance  Home Exercise Program  Pain INTERVENTIONS PLANNED:   (Benefits and precautions of physical therapy have been discussed with the patient.)  Home Exercise Program  Educational Discussion       GOALS: (Goals have been discussed and agreed upon with patient.)  Discharge Goals: Time Frame: 1 Day  Patient will demonstrate independence with a home exercise program designed to increase strength, range of motion, balance, coordination, functional technique, and pain control to minimize functional deficits and optimize patient for total joint replacement.    Subjective:   Past

## 2025-05-06 NOTE — PERIOP NOTE
Patient verified name and .    Order for consent was found in EHR and does match case posting; patient verified.     Type 3 surgery, [facility] assessment complete.    Labs per surgeon: CBC,CMP, A1C, PT ; results pending  Labs per anesthesia protocol: no additional  EKG: done today - within protocols.       Card ofv 6/10/24 in EMR that reads:    IMPRESSION:     Suspect GERD.  Low to moderate pre test probability of disease. TMET will help ascertain hemodynamic response to physical stress     Has recently increased water intake, shows in her labs which improved, encouraged to continue.       Did not feel well on BB, will discontinue for now. Palpitations suggest benign isolated ectopy     Return for FOR ETT.\"      Stress test ordered but not complete.  Pt cx on 25, 25 and 3/7/25.  Will have anesthesia review.       MRSA/MSSA swab collected per policy. MD to consult pharmacy to dose Vanc if appropriate.     Hospital approved surgical skin cleanser and instructions to return bottle on DOS given per hospital policy.    Patient provided with handouts including Guide to Surgery, Pain Management, Preventing Surgical Site Infections, and [facility] [place] [facility] Brochure.    Patient answered medical/surgical history questions at their best of ability. All prior to admission medications documented in Epic. Original medication prescription bottle was not visualized during patient appointment.     Patient instructed to hold all vitamins 3 weeks prior to surgery and NSAIDS 5 days prior to surgery.     Patient teach back successful and patient demonstrates knowledge of instruction.

## 2025-05-06 NOTE — PERIOP NOTE
PLEASE CONTINUE TAKING ALL PRESCRIPTION MEDICATIONS UP TO THE DAY OF SURGERY UNLESS OTHERWISE DIRECTED BELOW.    DISCONTINUE all vitamins, herbals and supplements 3 weeks prior to surgery. DISCONTINUE Non-Steroidal Anti-Inflammatory (NSAIDS) such as Advil, Ibuprofen, Motrin, Naproxen and Aleve 5 days prior to surgery.       Home Medications to take the day of surgery      Cymbalta     Gabapentin     Omeprazole     Home Medications to Hold- please continue all other medications except these.            Comments   On the day before surgery please take Acetaminophen 1000mg in the morning and then again before bed. You may substitute for Tylenol 650 mg.                  Please do not bring home medications with you on the day of surgery unless otherwise directed by your nurse.  If you are instructed to bring home medications, please give them to your nurse as they will be administered by the nursing staff.    If you have any questions, please call [facility] [phone].    A copy of this note was provided to the patient for reference.

## 2025-05-06 NOTE — TELEPHONE ENCOUNTER
Pt seen by [facility] 6/10/24.  Stress test ordered but not done.     Per Dr. Sauer, anesthesia:    Not cleared for surgery.     Needs 1 of the following prior to elective total joint:   - cardiology risk stratification/optimization statement   - complete stress test recommended by cardiology

## 2025-05-07 NOTE — PROGRESS NOTES
Hemoglobin A1C  Order: [id number]   Status: Final result       Next appt: 05/09/2025 at 09:30 AM in Cardiology (JOSUÉ TREVINO MD)       Dx: Unilateral primary osteoarthritis, le...    Test Result Released: Yes (seen)    0 Result Notes      Component  Ref Range & Units (hover) 5/6/25 1227   Hemoglobin A1C 5.4   Comment: Reference Range  Normal       <5.7%  Prediabetes  5.7-6.4%  Diabetes     >6.4%   Estimated Avg Glucose 108   Resulting Agency [facility] [place]             Specimen Collected: 05/06/25 12:27 EDT Last Resulted: 05/06/25 17:27 EDT

## 2025-05-07 NOTE — PROGRESS NOTES
[facility] [place] CARDIOLOGY  [address] [place] [address], [address]  [place] [address]  PHONE: [phone]      25    NAME:  Iris Myers  : 1945  MRN: [id number]         SUBJECTIVE:   Iris Myers is a 80 y.o. female seen for a follow up visit regarding the following:     Chief Complaint   Patient presents with    Follow-up    Cardiac Clearance    Hypertension            HPI:  Follow up  Follow-up, Cardiac Clearance, and Hypertension   .    Presents for pre-op evaluation prior to hip replacement surgery.  Patient was seen in 2024 with dyspnea, palpitations, fatigue and intermittent discomfort.  Clinically suspicious for GERD but arranged/recommended TMET at the time to assess hemodynamic response.  She never scheduled the test (her daughter was injured in an accident).  Received a call this week for perioperative risk assessment so asked her to return.     Apart from her hip pain she's felt ok.  She denies chest discomfort or particular dyspnea.  She is able to climb a flight of stairs though it hurts her hip, coming down is worse.  She still does her housework, makes the bed, lives alone so if its going to get done she does it. Her daughter will be staying a few days post op, and she has very dependable neighbors.          PAST CARDIAC HISTORY:  2019       Coronary calcium = 0              Cardiac Medications       Thiazides and Thiazide-Like Diuretics       hydroCHLOROthiazide (HYDRODIURIL) 25 MG tablet Take 1 tablet by mouth every morning     Patient taking differently: Take 1 tablet by mouth daily as needed       HMG CoA Reductase Inhibitors       atorvastatin (LIPITOR) 10 MG tablet Take 1 tablet by mouth daily       Angiotensin II Receptor Antagonists       valsartan (DIOVAN) 160 MG tablet Take 1 tablet by mouth daily                  Past Medical History, Past Surgical History, Family history, Social History, and Medications were all reviewed with the patient today and updated as

## 2025-05-09 ENCOUNTER — OFFICE VISIT (OUTPATIENT)
Age: 80
End: 2025-05-09

## 2025-05-09 VITALS
HEIGHT: 66 IN | SYSTOLIC BLOOD PRESSURE: 124 MMHG | DIASTOLIC BLOOD PRESSURE: 76 MMHG | BODY MASS INDEX: 27.64 KG/M2 | WEIGHT: 172 LBS

## 2025-05-09 DIAGNOSIS — Z01.818 PRE-OP EVALUATION: Primary | ICD-10-CM

## 2025-05-09 DIAGNOSIS — E78.5 DYSLIPIDEMIA: ICD-10-CM

## 2025-05-09 ASSESSMENT — ENCOUNTER SYMPTOMS: SHORTNESS OF BREATH: 0

## 2025-05-09 NOTE — H&P
have advised the patient of the risks and consequences, including possible complications of performing total joint replacement, as well as not doing this operation. The patient had the opportunity to ask questions and have them answered to their satisfaction.     The patient has been cleared preoperatively.  I counseled the patient once again about the risks of infection, DVT formation, expected time of hospitalization, anticipated recovery time as well as rehab needs and expectations for recovery.  The patient would like to proceed and we will do so as planned. The patient was provided with pain medications as well as DVT prophylaxis to have on hand postoperatively at the time of discharge from hospital. All pertinent questions asked by the patient were answered.    Signed:  ABEL Ram 5/9/2025

## 2025-05-13 DIAGNOSIS — M16.12 UNILATERAL PRIMARY OSTEOARTHRITIS, LEFT HIP: Primary | ICD-10-CM

## 2025-05-13 RX ORDER — CELECOXIB 200 MG/1
200 CAPSULE ORAL DAILY
Qty: 30 CAPSULE | Refills: 0 | Status: ON HOLD | OUTPATIENT
Start: 2025-05-13 | End: 2025-05-15 | Stop reason: HOSPADM

## 2025-05-13 RX ORDER — OXYCODONE HYDROCHLORIDE 5 MG/1
5-10 TABLET ORAL EVERY 4 HOURS PRN
Qty: 60 TABLET | Refills: 0 | Status: SHIPPED | OUTPATIENT
Start: 2025-05-13 | End: 2025-05-20

## 2025-05-13 RX ORDER — ASPIRIN 81 MG/1
81 TABLET, COATED ORAL
Qty: 60 TABLET | Refills: 0 | Status: SHIPPED | OUTPATIENT
Start: 2025-05-13

## 2025-05-13 RX ORDER — METHOCARBAMOL 750 MG/1
TABLET, FILM COATED ORAL
Qty: 40 TABLET | Refills: 0 | Status: SHIPPED | OUTPATIENT
Start: 2025-05-13

## 2025-05-13 RX ORDER — ONDANSETRON 4 MG/1
4 TABLET, FILM COATED ORAL EVERY 6 HOURS PRN
Qty: 30 TABLET | Refills: 0 | Status: SHIPPED | OUTPATIENT
Start: 2025-05-13

## 2025-05-14 ENCOUNTER — PREP FOR PROCEDURE (OUTPATIENT)
Dept: ORTHOPEDIC SURGERY | Age: 80
End: 2025-05-14

## 2025-05-14 DIAGNOSIS — M16.12 PRIMARY OSTEOARTHRITIS OF LEFT HIP: Primary | ICD-10-CM

## 2025-05-14 RX ORDER — SODIUM CHLORIDE 9 MG/ML
INJECTION, SOLUTION INTRAVENOUS PRN
Status: CANCELLED | OUTPATIENT
Start: 2025-05-14

## 2025-05-14 RX ORDER — SODIUM CHLORIDE 0.9 % (FLUSH) 0.9 %
5-40 SYRINGE (ML) INJECTION EVERY 12 HOURS SCHEDULED
Status: CANCELLED | OUTPATIENT
Start: 2025-05-14

## 2025-05-14 RX ORDER — SODIUM CHLORIDE 0.9 % (FLUSH) 0.9 %
5-40 SYRINGE (ML) INJECTION PRN
Status: CANCELLED | OUTPATIENT
Start: 2025-05-14

## 2025-05-15 ENCOUNTER — APPOINTMENT (OUTPATIENT)
Dept: GENERAL RADIOLOGY | Age: 80
End: 2025-05-15
Attending: ORTHOPAEDIC SURGERY
Payer: MEDICARE

## 2025-05-15 ENCOUNTER — HOSPITAL ENCOUNTER (OUTPATIENT)
Age: 80
Discharge: HOME HEALTH | End: 2025-05-15
Attending: ORTHOPAEDIC SURGERY | Admitting: ORTHOPAEDIC SURGERY
Payer: MEDICARE

## 2025-05-15 ENCOUNTER — ANESTHESIA (OUTPATIENT)
Dept: SURGERY | Age: 80
End: 2025-05-15
Payer: MEDICARE

## 2025-05-15 VITALS
OXYGEN SATURATION: 90 % | BODY MASS INDEX: 27.58 KG/M2 | DIASTOLIC BLOOD PRESSURE: 51 MMHG | HEIGHT: 66 IN | SYSTOLIC BLOOD PRESSURE: 102 MMHG | RESPIRATION RATE: 18 BRPM | HEART RATE: 82 BPM | WEIGHT: 171.6 LBS | TEMPERATURE: 97.3 F

## 2025-05-15 PROCEDURE — 3700000000 HC ANESTHESIA ATTENDED CARE: Performed by: ORTHOPAEDIC SURGERY

## 2025-05-15 PROCEDURE — 2709999900 HC NON-CHARGEABLE SUPPLY: Performed by: ORTHOPAEDIC SURGERY

## 2025-05-15 PROCEDURE — C1776 JOINT DEVICE (IMPLANTABLE): HCPCS | Performed by: ORTHOPAEDIC SURGERY

## 2025-05-15 PROCEDURE — 2580000003 HC RX 258: Performed by: ANESTHESIOLOGY

## 2025-05-15 PROCEDURE — 2500000003 HC RX 250 WO HCPCS: Performed by: ORTHOPAEDIC SURGERY

## 2025-05-15 PROCEDURE — 6370000000 HC RX 637 (ALT 250 FOR IP): Performed by: ANESTHESIOLOGY

## 2025-05-15 PROCEDURE — 6360000002 HC RX W HCPCS: Performed by: PHYSICIAN ASSISTANT

## 2025-05-15 PROCEDURE — 2580000003 HC RX 258: Performed by: ORTHOPAEDIC SURGERY

## 2025-05-15 PROCEDURE — 2720000010 HC SURG SUPPLY STERILE: Performed by: ORTHOPAEDIC SURGERY

## 2025-05-15 PROCEDURE — 6360000002 HC RX W HCPCS: Performed by: ORTHOPAEDIC SURGERY

## 2025-05-15 PROCEDURE — 97161 PT EVAL LOW COMPLEX 20 MIN: CPT

## 2025-05-15 PROCEDURE — 6360000002 HC RX W HCPCS: Performed by: ANESTHESIOLOGY

## 2025-05-15 PROCEDURE — 3700000001 HC ADD 15 MINUTES (ANESTHESIA): Performed by: ORTHOPAEDIC SURGERY

## 2025-05-15 PROCEDURE — 94760 N-INVAS EAR/PLS OXIMETRY 1: CPT

## 2025-05-15 PROCEDURE — 6360000002 HC RX W HCPCS: Performed by: REGISTERED NURSE

## 2025-05-15 PROCEDURE — 7100000000 HC PACU RECOVERY - FIRST 15 MIN: Performed by: ORTHOPAEDIC SURGERY

## 2025-05-15 PROCEDURE — 97530 THERAPEUTIC ACTIVITIES: CPT

## 2025-05-15 PROCEDURE — 6370000000 HC RX 637 (ALT 250 FOR IP): Performed by: PHYSICIAN ASSISTANT

## 2025-05-15 PROCEDURE — 7100000001 HC PACU RECOVERY - ADDTL 15 MIN: Performed by: ORTHOPAEDIC SURGERY

## 2025-05-15 PROCEDURE — 2500000003 HC RX 250 WO HCPCS: Performed by: REGISTERED NURSE

## 2025-05-15 PROCEDURE — 97165 OT EVAL LOW COMPLEX 30 MIN: CPT

## 2025-05-15 PROCEDURE — 72170 X-RAY EXAM OF PELVIS: CPT

## 2025-05-15 PROCEDURE — 2580000003 HC RX 258: Performed by: REGISTERED NURSE

## 2025-05-15 PROCEDURE — 3600000015 HC SURGERY LEVEL 5 ADDTL 15MIN: Performed by: ORTHOPAEDIC SURGERY

## 2025-05-15 PROCEDURE — 97535 SELF CARE MNGMENT TRAINING: CPT

## 2025-05-15 PROCEDURE — 3600000005 HC SURGERY LEVEL 5 BASE: Performed by: ORTHOPAEDIC SURGERY

## 2025-05-15 DEVICE — ACTIS DUOFIX HIP PROSTHESIS (FEMORAL STEM 12/14 TAPER CEMENTLESS SIZE 6 STD COLLAR)  CE
Type: IMPLANTABLE DEVICE | Site: HIP | Status: FUNCTIONAL
Brand: ACTIS

## 2025-05-15 DEVICE — PINNACLE HIP SOLUTIONS ALTRX POLYETHYLENE ACETABULAR LINER +4 NEUTRAL 36MM ID 56MM OD
Type: IMPLANTABLE DEVICE | Site: HIP | Status: FUNCTIONAL
Brand: PINNACLE ALTRX

## 2025-05-15 DEVICE — BIOLOX DELTA CERAMIC FEMORAL HEAD +1.5 36MM DIA 12/14 TAPER
Type: IMPLANTABLE DEVICE | Site: HIP | Status: FUNCTIONAL
Brand: BIOLOX DELTA

## 2025-05-15 DEVICE — APEX HOLE ELIMINATOR - PS
Type: IMPLANTABLE DEVICE | Site: HIP | Status: FUNCTIONAL
Brand: APEX

## 2025-05-15 DEVICE — HIP H2 TOT ADV OTHER HD IMPL CAPPED SYNTHES: Type: IMPLANTABLE DEVICE | Site: HIP | Status: FUNCTIONAL

## 2025-05-15 DEVICE — PINNACLE 100 ACETABULAR SHELL GRIPTION 100 56MM OD
Type: IMPLANTABLE DEVICE | Site: HIP | Status: FUNCTIONAL
Brand: PINNACLE GRIPTION

## 2025-05-15 RX ORDER — SODIUM CHLORIDE 0.9 % (FLUSH) 0.9 %
5-40 SYRINGE (ML) INJECTION PRN
Status: DISCONTINUED | OUTPATIENT
Start: 2025-05-15 | End: 2025-05-15 | Stop reason: HOSPADM

## 2025-05-15 RX ORDER — SENNA AND DOCUSATE SODIUM 50; 8.6 MG/1; MG/1
1 TABLET, FILM COATED ORAL 2 TIMES DAILY
Status: DISCONTINUED | OUTPATIENT
Start: 2025-05-15 | End: 2025-05-15 | Stop reason: HOSPADM

## 2025-05-15 RX ORDER — DIPHENHYDRAMINE HCL 25 MG
25 CAPSULE ORAL EVERY 6 HOURS PRN
Status: DISCONTINUED | OUTPATIENT
Start: 2025-05-15 | End: 2025-05-15 | Stop reason: HOSPADM

## 2025-05-15 RX ORDER — PROPOFOL 10 MG/ML
INJECTION, EMULSION INTRAVENOUS
Status: DISCONTINUED | OUTPATIENT
Start: 2025-05-15 | End: 2025-05-15 | Stop reason: SDUPTHER

## 2025-05-15 RX ORDER — DEXAMETHASONE SODIUM PHOSPHATE 10 MG/ML
10 INJECTION, SOLUTION INTRA-ARTICULAR; INTRALESIONAL; INTRAMUSCULAR; INTRAVENOUS; SOFT TISSUE EVERY 6 HOURS
Status: DISCONTINUED | OUTPATIENT
Start: 2025-05-15 | End: 2025-05-15

## 2025-05-15 RX ORDER — ROPIVACAINE HYDROCHLORIDE 2 MG/ML
INJECTION, SOLUTION EPIDURAL; INFILTRATION; PERINEURAL PRN
Status: DISCONTINUED | OUTPATIENT
Start: 2025-05-15 | End: 2025-05-15 | Stop reason: ALTCHOICE

## 2025-05-15 RX ORDER — PANTOPRAZOLE SODIUM 40 MG/1
40 TABLET, DELAYED RELEASE ORAL
Status: DISCONTINUED | OUTPATIENT
Start: 2025-05-16 | End: 2025-05-15 | Stop reason: HOSPADM

## 2025-05-15 RX ORDER — PROCHLORPERAZINE EDISYLATE 5 MG/ML
5 INJECTION INTRAMUSCULAR; INTRAVENOUS
Status: DISCONTINUED | OUTPATIENT
Start: 2025-05-15 | End: 2025-05-15 | Stop reason: HOSPADM

## 2025-05-15 RX ORDER — DULOXETIN HYDROCHLORIDE 60 MG/1
60 CAPSULE, DELAYED RELEASE ORAL DAILY
Status: DISCONTINUED | OUTPATIENT
Start: 2025-05-16 | End: 2025-05-15 | Stop reason: HOSPADM

## 2025-05-15 RX ORDER — SODIUM CHLORIDE 9 MG/ML
INJECTION, SOLUTION INTRAVENOUS PRN
Status: DISCONTINUED | OUTPATIENT
Start: 2025-05-15 | End: 2025-05-15 | Stop reason: HOSPADM

## 2025-05-15 RX ORDER — GABAPENTIN 100 MG/1
200 CAPSULE ORAL EVERY 12 HOURS
Status: DISCONTINUED | OUTPATIENT
Start: 2025-05-15 | End: 2025-05-15 | Stop reason: HOSPADM

## 2025-05-15 RX ORDER — LIDOCAINE HYDROCHLORIDE 20 MG/ML
INJECTION, SOLUTION EPIDURAL; INFILTRATION; INTRACAUDAL; PERINEURAL
Status: DISCONTINUED | OUTPATIENT
Start: 2025-05-15 | End: 2025-05-15 | Stop reason: SDUPTHER

## 2025-05-15 RX ORDER — MAGNESIUM HYDROXIDE/ALUMINUM HYDROXICE/SIMETHICONE 120; 1200; 1200 MG/30ML; MG/30ML; MG/30ML
15 SUSPENSION ORAL EVERY 6 HOURS PRN
Status: DISCONTINUED | OUTPATIENT
Start: 2025-05-15 | End: 2025-05-15 | Stop reason: HOSPADM

## 2025-05-15 RX ORDER — ONDANSETRON 2 MG/ML
INJECTION INTRAMUSCULAR; INTRAVENOUS
Status: DISCONTINUED | OUTPATIENT
Start: 2025-05-15 | End: 2025-05-15 | Stop reason: SDUPTHER

## 2025-05-15 RX ORDER — DEXAMETHASONE SODIUM PHOSPHATE 10 MG/ML
10 INJECTION, SOLUTION INTRA-ARTICULAR; INTRALESIONAL; INTRAMUSCULAR; INTRAVENOUS; SOFT TISSUE EVERY 6 HOURS
Status: DISCONTINUED | OUTPATIENT
Start: 2025-05-16 | End: 2025-05-15 | Stop reason: HOSPADM

## 2025-05-15 RX ORDER — ACETAMINOPHEN 325 MG/1
650 TABLET ORAL EVERY 6 HOURS
Status: DISCONTINUED | OUTPATIENT
Start: 2025-05-15 | End: 2025-05-15 | Stop reason: HOSPADM

## 2025-05-15 RX ORDER — LABETALOL HYDROCHLORIDE 5 MG/ML
10 INJECTION, SOLUTION INTRAVENOUS
Status: DISCONTINUED | OUTPATIENT
Start: 2025-05-15 | End: 2025-05-15 | Stop reason: HOSPADM

## 2025-05-15 RX ORDER — MONTELUKAST SODIUM 10 MG/1
10 TABLET ORAL DAILY
Status: DISCONTINUED | OUTPATIENT
Start: 2025-05-16 | End: 2025-05-15 | Stop reason: HOSPADM

## 2025-05-15 RX ORDER — NALOXONE HYDROCHLORIDE 0.4 MG/ML
0.4 INJECTION, SOLUTION INTRAMUSCULAR; INTRAVENOUS; SUBCUTANEOUS PRN
Status: DISCONTINUED | OUTPATIENT
Start: 2025-05-15 | End: 2025-05-15 | Stop reason: HOSPADM

## 2025-05-15 RX ORDER — VALSARTAN 160 MG/1
160 TABLET ORAL DAILY
Status: DISCONTINUED | OUTPATIENT
Start: 2025-05-16 | End: 2025-05-15 | Stop reason: HOSPADM

## 2025-05-15 RX ORDER — ONDANSETRON 2 MG/ML
4 INJECTION INTRAMUSCULAR; INTRAVENOUS
Status: COMPLETED | OUTPATIENT
Start: 2025-05-15 | End: 2025-05-15

## 2025-05-15 RX ORDER — OXYCODONE HYDROCHLORIDE 5 MG/1
5 TABLET ORAL
Status: COMPLETED | OUTPATIENT
Start: 2025-05-15 | End: 2025-05-15

## 2025-05-15 RX ORDER — LIDOCAINE HYDROCHLORIDE 10 MG/ML
1 INJECTION, SOLUTION INFILTRATION; PERINEURAL
Status: DISCONTINUED | OUTPATIENT
Start: 2025-05-15 | End: 2025-05-15 | Stop reason: HOSPADM

## 2025-05-15 RX ORDER — ACETAMINOPHEN 500 MG
1000 TABLET ORAL ONCE
Status: COMPLETED | OUTPATIENT
Start: 2025-05-15 | End: 2025-05-15

## 2025-05-15 RX ORDER — DIPHENHYDRAMINE HYDROCHLORIDE 50 MG/ML
25 INJECTION, SOLUTION INTRAMUSCULAR; INTRAVENOUS EVERY 6 HOURS PRN
Status: DISCONTINUED | OUTPATIENT
Start: 2025-05-15 | End: 2025-05-15 | Stop reason: HOSPADM

## 2025-05-15 RX ORDER — PROMETHAZINE HYDROCHLORIDE 25 MG/1
25 TABLET ORAL EVERY 6 HOURS PRN
Status: DISCONTINUED | OUTPATIENT
Start: 2025-05-15 | End: 2025-05-15 | Stop reason: HOSPADM

## 2025-05-15 RX ORDER — KETOROLAC TROMETHAMINE 15 MG/ML
15 INJECTION, SOLUTION INTRAMUSCULAR; INTRAVENOUS ONCE
Status: COMPLETED | OUTPATIENT
Start: 2025-05-15 | End: 2025-05-15

## 2025-05-15 RX ORDER — MIDAZOLAM HYDROCHLORIDE 1 MG/ML
INJECTION, SOLUTION INTRAMUSCULAR; INTRAVENOUS
Status: DISCONTINUED | OUTPATIENT
Start: 2025-05-15 | End: 2025-05-15 | Stop reason: SDUPTHER

## 2025-05-15 RX ORDER — ATORVASTATIN CALCIUM 10 MG/1
10 TABLET, FILM COATED ORAL DAILY
Status: DISCONTINUED | OUTPATIENT
Start: 2025-05-16 | End: 2025-05-15 | Stop reason: HOSPADM

## 2025-05-15 RX ORDER — NALOXONE HYDROCHLORIDE 0.4 MG/ML
INJECTION, SOLUTION INTRAMUSCULAR; INTRAVENOUS; SUBCUTANEOUS PRN
Status: DISCONTINUED | OUTPATIENT
Start: 2025-05-15 | End: 2025-05-15 | Stop reason: HOSPADM

## 2025-05-15 RX ORDER — OXYCODONE HYDROCHLORIDE 5 MG/1
5 TABLET ORAL EVERY 4 HOURS PRN
Status: DISCONTINUED | OUTPATIENT
Start: 2025-05-15 | End: 2025-05-15 | Stop reason: HOSPADM

## 2025-05-15 RX ORDER — HYDROMORPHONE HYDROCHLORIDE 1 MG/ML
0.5 INJECTION, SOLUTION INTRAMUSCULAR; INTRAVENOUS; SUBCUTANEOUS
Status: DISCONTINUED | OUTPATIENT
Start: 2025-05-15 | End: 2025-05-15 | Stop reason: HOSPADM

## 2025-05-15 RX ORDER — SODIUM CHLORIDE, SODIUM LACTATE, POTASSIUM CHLORIDE, CALCIUM CHLORIDE 600; 310; 30; 20 MG/100ML; MG/100ML; MG/100ML; MG/100ML
INJECTION, SOLUTION INTRAVENOUS CONTINUOUS
Status: DISCONTINUED | OUTPATIENT
Start: 2025-05-15 | End: 2025-05-15 | Stop reason: HOSPADM

## 2025-05-15 RX ORDER — MIDAZOLAM HYDROCHLORIDE 2 MG/2ML
2 INJECTION, SOLUTION INTRAMUSCULAR; INTRAVENOUS
Status: DISCONTINUED | OUTPATIENT
Start: 2025-05-15 | End: 2025-05-15 | Stop reason: HOSPADM

## 2025-05-15 RX ORDER — HYDROMORPHONE HYDROCHLORIDE 1 MG/ML
1 INJECTION, SOLUTION INTRAMUSCULAR; INTRAVENOUS; SUBCUTANEOUS
Status: DISCONTINUED | OUTPATIENT
Start: 2025-05-15 | End: 2025-05-15 | Stop reason: HOSPADM

## 2025-05-15 RX ORDER — SODIUM CHLORIDE 0.9 % (FLUSH) 0.9 %
5-40 SYRINGE (ML) INJECTION EVERY 12 HOURS SCHEDULED
Status: DISCONTINUED | OUTPATIENT
Start: 2025-05-15 | End: 2025-05-15 | Stop reason: HOSPADM

## 2025-05-15 RX ORDER — KETOROLAC TROMETHAMINE 30 MG/ML
INJECTION, SOLUTION INTRAMUSCULAR; INTRAVENOUS PRN
Status: DISCONTINUED | OUTPATIENT
Start: 2025-05-15 | End: 2025-05-15 | Stop reason: ALTCHOICE

## 2025-05-15 RX ORDER — FENTANYL CITRATE 50 UG/ML
100 INJECTION, SOLUTION INTRAMUSCULAR; INTRAVENOUS
Status: DISCONTINUED | OUTPATIENT
Start: 2025-05-15 | End: 2025-05-15 | Stop reason: HOSPADM

## 2025-05-15 RX ORDER — ONDANSETRON 2 MG/ML
4 INJECTION INTRAMUSCULAR; INTRAVENOUS EVERY 6 HOURS PRN
Status: DISCONTINUED | OUTPATIENT
Start: 2025-05-15 | End: 2025-05-15 | Stop reason: HOSPADM

## 2025-05-15 RX ORDER — SODIUM CHLORIDE 9 MG/ML
INJECTION, SOLUTION INTRAVENOUS CONTINUOUS
Status: DISCONTINUED | OUTPATIENT
Start: 2025-05-15 | End: 2025-05-15 | Stop reason: HOSPADM

## 2025-05-15 RX ORDER — HYDRALAZINE HYDROCHLORIDE 20 MG/ML
10 INJECTION INTRAMUSCULAR; INTRAVENOUS
Status: DISCONTINUED | OUTPATIENT
Start: 2025-05-15 | End: 2025-05-15 | Stop reason: HOSPADM

## 2025-05-15 RX ORDER — OXYCODONE HYDROCHLORIDE 5 MG/1
10 TABLET ORAL EVERY 4 HOURS PRN
Status: DISCONTINUED | OUTPATIENT
Start: 2025-05-15 | End: 2025-05-15 | Stop reason: HOSPADM

## 2025-05-15 RX ORDER — DEXAMETHASONE SODIUM PHOSPHATE 10 MG/ML
INJECTION, SOLUTION INTRA-ARTICULAR; INTRALESIONAL; INTRAMUSCULAR; INTRAVENOUS; SOFT TISSUE
Status: DISCONTINUED | OUTPATIENT
Start: 2025-05-15 | End: 2025-05-15 | Stop reason: SDUPTHER

## 2025-05-15 RX ORDER — ASPIRIN 81 MG/1
81 TABLET ORAL 2 TIMES DAILY
Status: DISCONTINUED | OUTPATIENT
Start: 2025-05-15 | End: 2025-05-15 | Stop reason: HOSPADM

## 2025-05-15 RX ORDER — METHOCARBAMOL 750 MG/1
750 TABLET, FILM COATED ORAL 4 TIMES DAILY PRN
Status: DISCONTINUED | OUTPATIENT
Start: 2025-05-15 | End: 2025-05-15 | Stop reason: HOSPADM

## 2025-05-15 RX ADMIN — OXYCODONE 10 MG: 5 TABLET ORAL at 10:06

## 2025-05-15 RX ADMIN — ONDANSETRON 4 MG: 2 INJECTION INTRAMUSCULAR; INTRAVENOUS at 07:27

## 2025-05-15 RX ADMIN — PHENYLEPHRINE HYDROCHLORIDE 40 MCG/MIN: 10 INJECTION INTRAVENOUS at 07:35

## 2025-05-15 RX ADMIN — Medication 2000 MG: at 07:30

## 2025-05-15 RX ADMIN — PHENYLEPHRINE HYDROCHLORIDE 100 MCG: 0.1 INJECTION, SOLUTION INTRAVENOUS at 07:27

## 2025-05-15 RX ADMIN — ACETAMINOPHEN 1000 MG: 500 TABLET, FILM COATED ORAL at 05:57

## 2025-05-15 RX ADMIN — Medication 1000 MG: at 07:05

## 2025-05-15 RX ADMIN — OXYCODONE 5 MG: 5 TABLET ORAL at 09:01

## 2025-05-15 RX ADMIN — PHENYLEPHRINE HYDROCHLORIDE 100 MCG: 0.1 INJECTION, SOLUTION INTRAVENOUS at 07:34

## 2025-05-15 RX ADMIN — SODIUM CHLORIDE, SODIUM LACTATE, POTASSIUM CHLORIDE, AND CALCIUM CHLORIDE: 600; 310; 30; 20 INJECTION, SOLUTION INTRAVENOUS at 07:44

## 2025-05-15 RX ADMIN — PHENYLEPHRINE HYDROCHLORIDE 100 MCG: 0.1 INJECTION, SOLUTION INTRAVENOUS at 07:40

## 2025-05-15 RX ADMIN — ONDANSETRON 4 MG: 2 INJECTION, SOLUTION INTRAMUSCULAR; INTRAVENOUS at 09:02

## 2025-05-15 RX ADMIN — LIDOCAINE HYDROCHLORIDE 30 MG: 20 INJECTION, SOLUTION EPIDURAL; INFILTRATION; INTRACAUDAL; PERINEURAL at 07:15

## 2025-05-15 RX ADMIN — MIDAZOLAM 2 MG: 1 INJECTION INTRAMUSCULAR; INTRAVENOUS at 06:50

## 2025-05-15 RX ADMIN — PROPOFOL 70 MG: 10 INJECTION, EMULSION INTRAVENOUS at 07:15

## 2025-05-15 RX ADMIN — SODIUM CHLORIDE, SODIUM LACTATE, POTASSIUM CHLORIDE, AND CALCIUM CHLORIDE: 600; 310; 30; 20 INJECTION, SOLUTION INTRAVENOUS at 06:06

## 2025-05-15 RX ADMIN — MEPIVACAINE HYDROCHLORIDE 60 MG: 20 INJECTION, SOLUTION EPIDURAL; INFILTRATION at 07:13

## 2025-05-15 RX ADMIN — PROPOFOL 75 MCG/KG/MIN: 10 INJECTION, EMULSION INTRAVENOUS at 07:16

## 2025-05-15 RX ADMIN — ONDANSETRON 4 MG: 2 INJECTION, SOLUTION INTRAMUSCULAR; INTRAVENOUS at 14:23

## 2025-05-15 RX ADMIN — DEXAMETHASONE SODIUM PHOSPHATE 10 MG: 10 INJECTION INTRAMUSCULAR; INTRAVENOUS at 07:27

## 2025-05-15 RX ADMIN — KETOROLAC TROMETHAMINE 15 MG: 15 INJECTION, SOLUTION INTRAMUSCULAR; INTRAVENOUS at 12:56

## 2025-05-15 RX ADMIN — ACETAMINOPHEN 650 MG: 325 TABLET, FILM COATED ORAL at 10:06

## 2025-05-15 ASSESSMENT — HOOS JR
HOOS JR RAW SCORE: 9
HOOS JR TOTAL INTERVAL SCORE: 61.815
SITTING: MILD
BENDING TO THE FLOOR TO PICK UP OBJECT: MODERATE
WALKING ON UNEVEN SURFACE: MODERATE
LYING IN BED (TURNING OVER, MAINTAINING HIP POSITION): MILD
GOING UP OR DOWN STAIRS: MODERATE
HOOS JR RAW SCORE: 9
RISING FROM SITTING: MILD

## 2025-05-15 ASSESSMENT — PAIN SCALES - GENERAL
PAINLEVEL_OUTOF10: 4
PAINLEVEL_OUTOF10: 3
PAINLEVEL_OUTOF10: 6
PAINLEVEL_OUTOF10: 3
PAINLEVEL_OUTOF10: 4
PAINLEVEL_OUTOF10: 3

## 2025-05-15 ASSESSMENT — PAIN DESCRIPTION - DESCRIPTORS
DESCRIPTORS: ACHING;DULL;DISCOMFORT
DESCRIPTORS: ACHING;SORE

## 2025-05-15 ASSESSMENT — PAIN DESCRIPTION - ORIENTATION
ORIENTATION: LEFT
ORIENTATION: LEFT

## 2025-05-15 ASSESSMENT — PAIN DESCRIPTION - PAIN TYPE: TYPE: SURGICAL PAIN

## 2025-05-15 ASSESSMENT — PAIN DESCRIPTION - LOCATION
LOCATION: HIP
LOCATION: HIP

## 2025-05-15 ASSESSMENT — PAIN DESCRIPTION - FREQUENCY: FREQUENCY: CONTINUOUS

## 2025-05-15 ASSESSMENT — PAIN - FUNCTIONAL ASSESSMENT: PAIN_FUNCTIONAL_ASSESSMENT: 0-10

## 2025-05-15 ASSESSMENT — PAIN DESCRIPTION - ONSET: ONSET: GRADUAL

## 2025-05-15 NOTE — CARE COORDINATION
Patient is a 80 y.o. year old female admitted for Left BOBBY . Patient plans to return home on discharge. Order received to arrange home health. Patient without preference towards agency. Referral sent to [facility] by Mario. Patient denies any equipment needs as patient has a walker.. Will follow until discharge.      05/15/25 0943   Service Assessment   Patient Orientation Alert and Oriented   Cognition Alert   History Provided By Patient   Primary Caregiver Self   Services At/After Discharge   Transition of Care Consult (CM Consult) Home Health   Internal Home Health Yes   Services At/After Discharge Home Health;PT   Mode of Transport at Discharge Self   Confirm Follow Up Transport Self   Condition of Participation: Discharge Planning   The Plan for Transition of Care is related to the following treatment goals: improve mobility   The Patient and/or Patient Representative was provided with a Choice of Provider? Patient   The Patient and/Or Patient Representative agree with the Discharge Plan? Yes   Freedom of Choice list was provided with basic dialogue that supports the patient's individualized plan of care/goals, treatment preferences, and shares the quality data associated with the providers?  Yes

## 2025-05-15 NOTE — FLOWSHEET NOTE
05/15/25 1513   AVS Reviewed   AVS & discharge instructions reviewed with patient and/or representative? Yes   Reviewed instructions with Patient   Level of Understanding Questions answered;Teach back completed;Verbalized understanding

## 2025-05-15 NOTE — ANESTHESIA PROCEDURE NOTES
Spinal Block    Patient location during procedure: OR  End time: 5/15/2025 7:13 AM  Reason for block: primary anesthetic and at surgeon's request  Staffing  Performed: anesthesiologist   Anesthesiologist: Jefferson Kamara DO  Performed by: Jefferson Kamara DO  Authorized by: Jefferson Kamara DO    Spinal Block  Patient position: sitting  Prep: ChloraPrep  Patient monitoring: cardiac monitor, continuous pulse ox, continuous capnometry, frequent blood pressure checks and oxygen  Approach: midline  Location: L3/L4  Provider prep: mask and sterile gloves  Local infiltration: lidocaine  Needle  Needle type: Sprotte Tip   Needle gauge: 25 G  Needle length: 3.5 in  Assessment  Swirl obtained: Yes  CSF: clear  Attempts: 3+  Hemodynamics: stable  Additional Notes  Difficult spinal.  After repositioning and different levels, successful entry into intrathecal space.  Tolerated well  Preanesthetic Checklist  Completed: patient identified, IV checked, site marked, risks and benefits discussed, surgical/procedural consents, equipment checked, pre-op evaluation, timeout performed, anesthesia consent given, oxygen available and monitors applied/VS acknowledged

## 2025-05-15 NOTE — DISCHARGE INSTRUCTIONS
[facility]      Patient Discharge Instructions    Iris Myers/[id number] : 1945    Admitted 5/15/2025 Discharged: 5/15/2025       IF YOU HAVE ANY PROBLEMS ONCE YOU ARE AT HOME CALL THE FOLLOWING NUMBERS:   Main office number: [phone]      Medications    The medications you are to continue on are listed on the medication reconciliation sheet.   Narcotic pain medications as well as supplemental iron can cause constipation. If this occurs try stopping the narcotic pain medication and/or the iron.   It is important that you take the medication exactly as they are prescribed.  Medications which increase your risk of blood clots are listed to stop for 5 weeks after surgery as well as medications or supplements which increase your risk of bleeding complications.   Keep your medication in the bottles provided by the pharmacist and keep a list of the medication names, dosages, and times to be taken in your wallet.   Do not take other medications without consulting your doctor.       Important Information    Do NOT smoke as this will greatly increase your risk of infection!    Resume your prehospital diet. If you have excessive nausea or vomitting call your doctor's office     Leg swelling and warmth is normal for 6 months after surgery. If you experience swelling in your leg elevate you leg while laying down with your toes above your heart. If you have sudden onset severe swelling with leg pain call our office. Use Alfred Hose stockings until we see you in the office for your follow up appointment.    The stitches deep inside take approximately 6 months to dissolve. There will be sharp shooting, stinging and burning pain. This is normal and will resolve between 3-6 months after surgery.     Difficulty sleeping is normal following total Knee and Hip replacement. You may try melatonin, an over-the-counter sleep aid or benadryl to help with sleep. Most patients will resume sleeping through the night 8

## 2025-05-15 NOTE — PROGRESS NOTES
OCCUPATIONAL THERAPY Initial Assessment, Daily Note, and AM      (Link to Caseload Tracking: OT Visit Days: 1  OT Orders   Time  OT Charge Capture  Rehab Caseload Tracker  Episode     Iris Myers is a 80 y.o. female   PRIMARY DIAGNOSIS: Primary osteoarthritis of left hip  Primary osteoarthritis of left hip [M16.12]  Procedure(s) (LRB):  HIP TOTAL ARTHROPLASTY-LEFT SDD (Left)  * Day of Surgery *  Reason for Referral: Pain in left hip (M25.552)  Stiffness of Left Hip, Not elsewhere classified (M25.652)  Other lack of cordination (R27.8)  Difficulty in walking, Not elsewhere classified (R26.2)  Other abnormalities of gait and mobility (R26.89)  Outpatient in a bed: Payor: MEDICARE / Plan: MEDICARE PART A AND B / Product Type: *No Product type* /     ASSESSMENT:     REHAB RECOMMENDATIONS:   Recommendation to date pending progress:  Setting:  No further skilled occupational therapy after discharge from hospital    Equipment:    None     ASSESSMENT:  Ms. Myers is s/p left BOBBY and presents with decreased independence with functional mobility and activities of daily living as compared to baseline level of function and safety. Patient would benefit from skilled Occupational Therapy to maximize independence and safety with self-care task and functional mobility.   Patient in bed s/p L BOBBY. She was assisted with donning underwear and pants. She transferred to eob with min assist. She sat and rested then completed dressing. Her daughter was preset. Patient stood with min and completed clothing management with Cga. She took steps to recliner with min assist. She stood with Cga and then returned to sit. She was reclined and set up with her needs. OT educated on OT plan of care, discharge planning, adl task performance, post op showering, hip precautions, hip kit training, walker use and home safety.  OT issued long handled sponge for home use. She was given an ice pack and plans to discharge home today with good support

## 2025-05-15 NOTE — PROGRESS NOTES
TRANSFER - IN REPORT:    Verbal report received from Kayleigh RN on Iris Myers  being received from PACU for routine progression of patient care      Report consisted of patient's Situation, Background, Assessment and   Recommendations(SBAR).     Information from the following report(s) Nurse Handoff Report, Adult Overview, Surgery Report, Intake/Output, and MAR was reviewed with the receiving nurse.    Opportunity for questions and clarification was provided.      Assessment completed upon patient's arrival to unit and care assumed.

## 2025-05-15 NOTE — ANESTHESIA POSTPROCEDURE EVALUATION
Department of Anesthesiology  Postprocedure Note    Patient: Iris Myers  MRN: [id number]  YOB: 1945  Date of evaluation: 5/15/2025    Procedure Summary       Date: 05/15/25 Room / Location: [facility] MAIN OR 08 / E MAIN OR    Anesthesia Start: 0650 Anesthesia Stop: 0838    Procedure: HIP TOTAL ARTHROPLASTY-LEFT SDD (Left: Hip) Diagnosis:       Primary osteoarthritis of left hip      (Primary osteoarthritis of left hip [M16.12])    Surgeons: Julio C Smith MD Responsible Provider: Jefferson Kamara DO    Anesthesia Type: spinal ASA Status: 2            Anesthesia Type: No value filed.    Jorge Phase I: Jorge Score: 9    Jorge Phase II:      Anesthesia Post Evaluation    Patient location during evaluation: PACU  Level of consciousness: awake and alert  Airway patency: patent  Nausea & Vomiting: no nausea  Cardiovascular status: hemodynamically stable  Respiratory status: acceptable  Hydration status: euvolemic  Comments: Blood pressure (!) 119/56, pulse 61, temperature 98.1 °F (36.7 °C), temperature source Temporal, resp. rate 17, height 1.676 m (5' 5.98\"), weight 77.8 kg (171 lb 9.6 oz), SpO2 95%.  Pain management: satisfactory to patient    No notable events documented.

## 2025-05-15 NOTE — PROGRESS NOTES
4 Eyes Skin Assessment     NAME:  Iris Myers  YOB: 1945  MEDICAL RECORD NUMBER:  [id number]    The patient is being assessed for  Admission    I agree that at least one RN has performed a thorough Head to Toe Skin Assessment on the patient. ALL assessment sites listed below have been assessed.      Areas assessed by both nurses:    Head, Face, Ears, Shoulders, Back, Chest, Arms, Elbows, Hands, Sacrum. Buttock, Coccyx, Ischium, and Legs. Feet and Heels        Does the Patient have a Wound? No noted wound(s)       Gabriel Prevention initiated by RN: Yes  Wound Care Orders initiated by RN: No    Pressure Injury (Stage 3,4, Unstageable, DTI, NWPT, and Complex wounds) if present, place Wound referral order by RN under : No    New Ostomies, if present place, Ostomy referral order under : No     Nurse 1 eSignature: Electronically signed by Kaylan Barry RN on 5/15/25 at 9:24 AM EDT    **SHARE this note so that the co-signing nurse can place an eSignature**    Nurse 2 eSignature: Electronically signed by Phoenix M Chimato, RN on 5/15/25 at 9:47 AM EDT

## 2025-05-15 NOTE — DISCHARGE SUMMARY
[facility]  Total Joint Discharge Summary      Patient ID:  Iris Myers  [id number]  80 y.o.  1945    Admit date: 5/15/2025  Discharge date and time: 05/15/25   Admitting Physician: Julio C Smith MD  Surgeon: Same  Admission Diagnoses: Primary osteoarthritis of left hip [M16.12]  Discharge Diagnoses: Principal Problem:    Primary osteoarthritis of left hip  Resolved Problems:    * No resolved hospital problems. *                              Perioperative Antibiotics: Ancef 1 to 3 g was given depending on patient's weight. If allergic to Ancef or due to other indications, patient was given Vancomycin/Gent per protocol      Hospital Medications given:   [START ON 5/16/2025] atorvastatin, 10 mg, Daily  [START ON 5/16/2025] DULoxetine, 60 mg, Daily  gabapentin, 200 mg, Q12H  [START ON 5/16/2025] montelukast, 10 mg, Daily  [START ON 5/16/2025] pantoprazole, 40 mg, QAM AC  [START ON 5/16/2025] valsartan, 160 mg, Daily  sodium chloride flush, 5-40 mL, 2 times per day  ceFAZolin (ANCEF) IV, 2,000 mg, Q8H  acetaminophen, 650 mg, Q6H  sennosides-docusate sodium, 1 tablet, BID  aspirin, 81 mg, BID  ketorolac, 15 mg, Once  [START ON 5/16/2025] dexAMETHasone, 10 mg, Q6H      sodium chloride  sodium chloride      sodium chloride flush, 5-40 mL, PRN  sodium chloride, , PRN  oxyCODONE, 5 mg, Q4H PRN   Or  oxyCODONE, 10 mg, Q4H PRN  HYDROmorphone, 0.5 mg, Q3H PRN   Or  HYDROmorphone, 1 mg, Q3H PRN  promethazine, 25 mg, Q6H PRN   Or  ondansetron, 4 mg, Q6H PRN  aluminum & magnesium hydroxide-simethicone, 15 mL, Q6H PRN  diphenhydrAMINE, 25 mg, Q6H PRN   Or  diphenhydrAMINE, 25 mg, Q6H PRN  melatonin, 3 mg, Nightly PRN  sodium chloride, 1 spray, Q4H PRN  methocarbamol, 750 mg, 4x Daily PRN  naloxone, 0.4 mg, PRN        Discharge Medications given:  Current Discharge Medication List        CONTINUE these medications which have NOT CHANGED    Details   oxyCODONE (ROXICODONE) 5 MG immediate release

## 2025-05-15 NOTE — OP NOTE
[facility]  Total Hip Procedure Note      Name: Iris Myers  YOB: 1945  Gender: female  MRN: [id number]    Pre-operative Diagnosis:  Severe Right Hip DJD  Post-operative Diagnosis: Same    Surgeon: PREET CRUZ MD  Assistant:Pawan Spann PA-C    This surgical assistant was present for the procedure and vital for the performance of the procedure. He assisted with exposure and retraction during the procedure as well as wound closure and dressing application after the procedure.     Anesthesia: Spinal      Procedure: Total Hip Arthroplasty   The complexity of the total joint surgery requires the use of a first assistant for positioning, retraction and assistance in closure. The patient's Body mass index is 27.71 kg/m²., BMI's greater then 40 make surgical exposure and retraction extremely difficult and increase operative time.    Iris Myers was brought to the operating room and positioned on the operating table.  She was anesthestized .   IV antibiotics per CMS protocol were administered. Prior to the incision being made a timeout was called identifying the patient, procedure ,operative side and surgeon.     Iris Myers was positioned in the lateral decubitus position with the  left  side up.The limb was prepped and draped in the usual sterile fashion.  The direct lateral approach was utilized to expose the hip.  The incision was carried through the subcutaneous tissue and underlying fascia with homeostasis obtained using the bovie cauterization.  A Charnley retractor was inserted.  The abductors were taken down at the junction of the anterior and middle third.  The sciatic nerve was palpated and identified.  Following comparison of leg lengths, the femoral head was dislocated.  The neck was osteotomized in the appropriate position just above the lesser trochanteric region.     The acetabular retractor was placed and the appropriate capsulotomy performed.  Soft

## 2025-05-15 NOTE — INTERVAL H&P NOTE
Update History & Physical    The patient's History and Physical of May 9, 2025 was reviewed with the patient and I examined the patient. There was no change. The surgical site was confirmed by the patient and me.     Plan: The risks, benefits, expected outcome, and alternative to the recommended procedure have been discussed with the patient. Patient understands and wants to proceed with the procedure.     Electronically signed by PREET CRUZ MD on 5/15/2025 at 6:31 AM

## 2025-05-15 NOTE — PROGRESS NOTES
ACUTE PHYSICAL THERAPY GOALS:   (Developed with and agreed upon by patient and/or caregiver.)  GOALS (1-4 days):  (1.)Ms. Myers will move from supine to sit and sit to supine  in bed with INDEPENDENT.    (2.)Ms. Myers will transfer from bed to chair and chair to bed with SUPERVISION using the least restrictive device.    (3.)Ms. Myers will ambulate with SUPERVISION for 200 feet with the least restrictive device.     ________________________________________________________________________________________________      PHYSICAL THERAPY: TOTAL HIP ARTHROPLASTY Initial Assessment  (Link to Caseload Tracking: PT Visit Days : 1  Acknowledge Orders  Time In/Out  PT Charge Capture  Rehab Caseload Tracker  Episode   Iris Myers is a 80 y.o. female   PRIMARY DIAGNOSIS: Primary osteoarthritis of left hip  Primary osteoarthritis of left hip [M16.12]  Procedure(s) (LRB):  HIP TOTAL ARTHROPLASTY-LEFT SDD (Left)  * Day of Surgery *  Reason for Referral: Pain in left hip (M25.552)  Stiffness of Left Hip, Not elsewhere classified (M25.652)  Difficulty in walking, Not elsewhere classified (R26.2)  Outpatient in a bed: Payor: MEDICARE / Plan: MEDICARE PART A AND B / Product Type: *No Product type* /     REHAB RECOMMENDATIONS:   Recommendation to date pending progress:  Setting:  Home Health Therapy    Equipment:    None     GAIT: I Mod I S SBA CGA Min Mod Max Total  NT x2 Comments:   Level of Assistance [] [] [] [x] [] [] [] [] [] [] []    Weightbearing Status  Left Lower Extremity Weight Bearing: Weight Bearing As Tolerated    Distance  160 feet    Gait Quality Decreased denzel , Decreased step length, and Decreased stance    DME Rollator     Stairs      Ramp     I=Independent, Mod I=Modified Independent, S=Supervision, SBA=Standby Assistance, CGA=Contact Guard Assistance,   Min=Minimal Assistance, Mod=Moderate Assistance, Max=Maximal Assistance, Total=Total Assistance, NT=Not Tested      ASSESSMENT:   ASSESSMENT:  Ms.

## 2025-05-15 NOTE — ANESTHESIA PRE PROCEDURE
Department of Anesthesiology  Preprocedure Note       Name:  Iris Myers   Age:  80 y.o.  :  1945                                          MRN:  [id number]         Date:  5/15/2025      Surgeon: Surgeon(s):  Julio C Smith MD    Procedure: Procedure(s):  HIP TOTAL ARTHROPLASTY-LEFT SDD    Medications prior to admission:   Prior to Admission medications    Medication Sig Start Date End Date Taking? Authorizing Provider   Naproxen Sodium (ALEVE PO) Take by mouth as needed   Yes ProviderVarun MD   ibuprofen (ADVIL;MOTRIN) 200 MG tablet Take 1 tablet by mouth every 6 hours as needed for Pain   Yes Provider, MD Varun   gabapentin (NEURONTIN) 100 MG capsule Take 2 capsules by mouth in the morning and 2 capsules in the evening. 25 Yes Vanessa Mcgregor MD   hydroCHLOROthiazide (HYDRODIURIL) 25 MG tablet Take 1 tablet by mouth every morning  Patient taking differently: Take 1 tablet by mouth daily as needed 25  Yes Vanessa Mcgregor MD   omeprazole (PRILOSEC) 40 MG delayed release capsule Take 1 capsule by mouth daily 25  Yes Vanessa Mcgregor MD   valsartan (DIOVAN) 160 MG tablet Take 1 tablet by mouth daily 25  Yes Vanessa Mcgregor MD   DULoxetine (CYMBALTA) 60 MG extended release capsule Take 1 capsule by mouth daily 25  Yes Vanessa Mcgregor MD   atorvastatin (LIPITOR) 10 MG tablet Take 1 tablet by mouth daily 10/15/24  Yes Vanessa Mcgregor MD   montelukast (SINGULAIR) 10 MG tablet Take 1 tablet by mouth daily 24  Yes Vanessa Mcgregor MD   oxyCODONE (ROXICODONE) 5 MG immediate release tablet Take 1-2 tablets by mouth every 4 hours as needed for Pain for up to 7 days. Max Daily Amount: 60 mg  Patient not taking: Reported on 5/15/2025 5/13/25 5/20/25  Julio C Smith MD   methocarbamol (ROBAXIN-750) 750 MG tablet Take 1 tablet by mouth every 6 hours as needed for spasms.  Patient not taking: Reported on 5/15/2025 5/13/25   [place],

## 2025-05-16 ENCOUNTER — TELEPHONE (OUTPATIENT)
Dept: ORTHOPEDIC SURGERY | Age: 80
End: 2025-05-16

## 2025-05-19 ENCOUNTER — TELEPHONE (OUTPATIENT)
Dept: ORTHOPEDICS UNIT | Age: 80
End: 2025-05-19

## 2025-05-19 NOTE — TELEPHONE ENCOUNTER
Called patient on 5/16/25 to follow up after BOBBY with SDD on 5/15/25.  No answer.   left with contact number for ortho navigator.

## 2025-05-22 NOTE — PROGRESS NOTES
Sonia Ruiz  : 1945  Primary: Medicare Part [place] And B  Secondary: [address] @ [facility]  [facility] [address]  Phone: [phone]  Fax: [phone] Plan Frequency: 2    Plan of Care/Certification Expiration Date: 22      PT Visit Info: Total # of Visits to Date: 9      OUTPATIENT PHYSICAL THERAPY:OP NOTE TYPE: Recertification [id number]               Episode  Appt Desk         Treatment Diagnosis:ICD-10: Treatment Diagnosis: Low back pain (M54.5)  Lumbago with Sciatica Left side (M54.42)  Muscle Weakness, Generalized (M62.81)  Muscle spasm of back (M62.830)  Pain in left hip (M25.552)    * No diagnoses found *  Medical/Referring Diagnosis:  Lumbago with sciatica, unspecified side [M54.40]  Referring Physician:  Quinton Gavin MD MD Orders:  PT Eval and Treat   Return MD Appt:  TBD by patient  Date of Onset:  Onset Date: 22     Allergies:  Sulfa antibiotics  Restrictions/Precautions:    Restrictions/Precautions: None  No data recorded   Medications Last Reviewed:  2022     ASSESSMENT   RecertAssessment: Mrs Catie Brenner has met 2/4 STGs and 3/5 LTGs over course of therapy intervention focusing on improving LE and lumbar strength, pain management, improving overall lumbar and hip mobility, and increasing activity tolerance for IADLs and golfing. Pt is able to lift 45 lb, from a 15.5 in surface, carry 15 lb in each hand while walking, push/pull 75 lb. Pt demos improving overall LE strength as she is able to complete 10.5 sit to stands in 30 seconds. Pt to continue with current therapy POC in order to assist with developlment of gym exercise program as she is now interested in starting a community based wellness program, build strength to return to golf, and increase functional capcity for prolonged walking required for golfing and wellness goals.      Problem List: (Impacting functional limitations): Body Structures, Functions, Activity Limitations Requiring Skilled Therapeutic Intervention: Decreased functional mobility ; Decreased strength; Increased pain; Decreased posture; Decreased ADL status; Decreased ROM; Decreased body mechanics; Decreased tolerance to work activity; Decreased balance; Decreased coordination     Therapy Prognosis:   Therapy Prognosis: Good     Assessment Complexity:   Low Complexity  PLAN   Effective Dates: 7/18/2022 TO Plan of Care/Certification Expiration Date: 08/29/22     Frequency/Duration: Plan Frequency: 2 for 6 weeks     Interventions Planned (Treatment may consist of any combination of the following):    Current Treatment Recommendations: Strengthening; ROM; Balance training; Functional mobility training; Transfer training; IADL training; Endurance training; Pain management; Manual Therapy - Joint Manipulation; Manual Therapy - Soft Tissue Mobilization; Return to work related activity; Home exercise program; Safety education & training; Patient/Caregiver education & training; Equipment evaluation, education, & procurement; Modalities; Integrated dry needling; Therapeutic activities         GOALS: (Goals have been discussed and agreed upon with patient.)   Short-Term Goals: 4 weeks  Goal Met   1Flex Hatch Rehana will be independent with HEP to maintain functional gains made with therapy intervention. 1.  [] Date: progressing   2. Holden Ours will be able to complete sit to stand 13 reps for improved strength and balance with no radicular symptoms. 2.  [] Date: progressing   3. Holden Ours will participate in walking program x 6 minutes at a distance of 1200 ft to be comparable to age related norms. 3.  [x] Date: 7/18/22   4. Holden Ours will be able to sit for 60 min in order to drive in car and complete work related responsibilities. 4.  [x] Date: 7/18/22              Long Term Goals: 8 weeks Goal Met   1Flex Hatch Ours will demonstrate a 10 point improvement on [Gradual] : gradual [4] : 4 [Radiating] : radiating [Stabbing] : stabbing [Work] : work [Sleep] : sleep [Nothing helps with pain getting better] : Nothing helps with pain getting better [Walking] : walking [Lying in bed] : lying in bed [de-identified] : Pt. is a 42 year old female who presents for evaluation of her RT foot. Denies trauma. Symptoms since March 2025. No formal treatment to date. WB without assistive device. No previous injury/problem with RT foot.  [] : no [FreeTextEntry1] : rt foot [FreeTextEntry5] : rt foot/ toe pain for 2 months, no injury [FreeTextEntry7] : toe [de-identified] :

## 2025-06-18 ENCOUNTER — OFFICE VISIT (OUTPATIENT)
Dept: ORTHOPEDIC SURGERY | Age: 80
End: 2025-06-18

## 2025-06-18 DIAGNOSIS — Z96.642 S/P TOTAL LEFT HIP ARTHROPLASTY: Primary | ICD-10-CM

## 2025-06-18 PROCEDURE — 99024 POSTOP FOLLOW-UP VISIT: CPT | Performed by: PHYSICIAN ASSISTANT

## 2025-06-18 NOTE — PROGRESS NOTES
Name: Iris Myers  YOB: 1945  Gender: female  MRN: [id number]    LEFT Post-Op BOBBY: 4 weeks    This patient returns now 4 weeks s/p BOBBY.  They are doing well.  There have been no significant issues since last visit.  Patient is anxious to increase level of activity.  Today they complain of no significant symptoms.      PE: On exam today, incision looks good.  The patient is ambulating with a steady gait with the use of CANE.  There is minimal discomfort with gentle range of motion of the operative hip.     RADIOGRAPHS:  AP pelvis and table lateral of the LEFT hip which demonstrate well fixed implants in good position.  No sign of fracture or concern.    RADIOGRAPHIC IMPRESSION:  Stable LEFTTHA.    CLINICAL IMPRESSION AND PLAN:  Now four weeks s/p LEFT BOBBY. I advised them to continue to wean from their current assistive device and to resume activities as tolerated.   Further activity was discussed with the patient as was further pain medications.  The patient will return in 4-5 months.    Work/Activity Restrictions: NOT APPLICABLE    ABEL Ram

## 2025-08-10 ENCOUNTER — HOSPITAL ENCOUNTER (EMERGENCY)
Age: 80
Discharge: HOME | End: 2025-08-11
Attending: EMERGENCY MEDICINE
Payer: MEDICARE

## 2025-08-10 ENCOUNTER — APPOINTMENT (OUTPATIENT)
Dept: GENERAL RADIOLOGY | Age: 80
End: 2025-08-10
Payer: MEDICARE

## 2025-08-10 DIAGNOSIS — F10.929 ACUTE ALCOHOLIC INTOXICATION WITH COMPLICATION: Primary | ICD-10-CM

## 2025-08-10 DIAGNOSIS — R07.9 CHEST PAIN, UNSPECIFIED TYPE: ICD-10-CM

## 2025-08-10 LAB
ALBUMIN SERPL-MCNC: 4.1 G/DL (ref 3.2–4.6)
ALBUMIN/GLOB SERPL: 1.5 (ref 1–1.9)
ALP SERPL-CCNC: 104 U/L (ref 35–104)
ALT SERPL-CCNC: 10 U/L (ref 12–65)
ANION GAP SERPL CALC-SCNC: 17 MMOL/L (ref 7–16)
AST SERPL-CCNC: 17 U/L (ref 15–37)
BASOPHILS # BLD: 0.04 K/UL (ref 0–0.2)
BASOPHILS NFR BLD: 0.7 % (ref 0–2)
BILIRUB SERPL-MCNC: 0.6 MG/DL (ref 0–1.2)
BUN SERPL-MCNC: 24 MG/DL (ref 8–23)
CALCIUM SERPL-MCNC: 8.9 MG/DL (ref 8.8–10.2)
CHLORIDE SERPL-SCNC: 102 MMOL/L (ref 98–107)
CO2 SERPL-SCNC: 19 MMOL/L (ref 20–29)
CREAT SERPL-MCNC: 0.98 MG/DL (ref 0.8–1.3)
DIFFERENTIAL METHOD BLD: ABNORMAL
EOSINOPHIL # BLD: 0.23 K/UL (ref 0–0.8)
EOSINOPHIL NFR BLD: 4 % (ref 0.5–7.8)
ERYTHROCYTE [DISTWIDTH] IN BLOOD BY AUTOMATED COUNT: 13 % (ref 11.9–14.6)
ETHANOL SERPL-MCNC: 140 MG/DL (ref 0–0.08)
GLOBULIN SER CALC-MCNC: 2.8 G/DL (ref 2.3–3.5)
GLUCOSE SERPL-MCNC: 105 MG/DL (ref 70–99)
HCT VFR BLD AUTO: 34.7 % (ref 35.8–46.3)
HGB BLD-MCNC: 11.6 G/DL (ref 11.7–15.4)
IMM GRANULOCYTES # BLD AUTO: 0.02 K/UL (ref 0–0.5)
IMM GRANULOCYTES NFR BLD AUTO: 0.4 % (ref 0–5)
LIPASE SERPL-CCNC: 41 U/L (ref 13–60)
LYMPHOCYTES # BLD: 1.29 K/UL (ref 0.5–4.6)
LYMPHOCYTES NFR BLD: 22.7 % (ref 13–44)
MAGNESIUM SERPL-MCNC: 1.8 MG/DL (ref 1.8–2.4)
MCH RBC QN AUTO: 29.7 PG (ref 26.1–32.9)
MCHC RBC AUTO-ENTMCNC: 33.4 G/DL (ref 31.4–35)
MCV RBC AUTO: 89 FL (ref 82–102)
MONOCYTES # BLD: 0.49 K/UL (ref 0.1–1.3)
MONOCYTES NFR BLD: 8.6 % (ref 4–12)
NEUTS SEG # BLD: 3.62 K/UL (ref 1.7–8.2)
NEUTS SEG NFR BLD: 63.6 % (ref 43–78)
NRBC # BLD: 0 K/UL (ref 0–0.2)
PLATELET # BLD AUTO: 210 K/UL (ref 150–450)
PMV BLD AUTO: 9.5 FL (ref 9.4–12.3)
POTASSIUM SERPL-SCNC: 3.5 MMOL/L (ref 3.5–5.1)
PROT SERPL-MCNC: 6.9 G/DL (ref 6.3–8.2)
RBC # BLD AUTO: 3.9 M/UL (ref 4.05–5.2)
SODIUM SERPL-SCNC: 138 MMOL/L (ref 133–143)
TROPONIN T SERPL HS-MCNC: 8.6 NG/L (ref 0–14)
WBC # BLD AUTO: 5.7 K/UL (ref 4.3–11.1)

## 2025-08-10 PROCEDURE — 2580000003 HC RX 258: Performed by: EMERGENCY MEDICINE

## 2025-08-10 PROCEDURE — 96374 THER/PROPH/DIAG INJ IV PUSH: CPT

## 2025-08-10 PROCEDURE — 81003 URINALYSIS AUTO W/O SCOPE: CPT

## 2025-08-10 PROCEDURE — 6360000002 HC RX W HCPCS: Performed by: EMERGENCY MEDICINE

## 2025-08-10 PROCEDURE — 80053 COMPREHEN METABOLIC PANEL: CPT

## 2025-08-10 PROCEDURE — 81001 URINALYSIS AUTO W/SCOPE: CPT

## 2025-08-10 PROCEDURE — 99285 EMERGENCY DEPT VISIT HI MDM: CPT

## 2025-08-10 PROCEDURE — 2500000003 HC RX 250 WO HCPCS: Performed by: EMERGENCY MEDICINE

## 2025-08-10 PROCEDURE — 82077 ASSAY SPEC XCP UR&BREATH IA: CPT

## 2025-08-10 PROCEDURE — 93005 ELECTROCARDIOGRAM TRACING: CPT | Performed by: EMERGENCY MEDICINE

## 2025-08-10 PROCEDURE — 85025 COMPLETE CBC W/AUTO DIFF WBC: CPT

## 2025-08-10 PROCEDURE — 84484 ASSAY OF TROPONIN QUANT: CPT

## 2025-08-10 PROCEDURE — 83690 ASSAY OF LIPASE: CPT

## 2025-08-10 PROCEDURE — 83735 ASSAY OF MAGNESIUM: CPT

## 2025-08-10 PROCEDURE — 71045 X-RAY EXAM CHEST 1 VIEW: CPT

## 2025-08-10 PROCEDURE — 81015 MICROSCOPIC EXAM OF URINE: CPT

## 2025-08-10 RX ORDER — ONDANSETRON 2 MG/ML
4 INJECTION INTRAMUSCULAR; INTRAVENOUS
Status: DISCONTINUED | OUTPATIENT
Start: 2025-08-10 | End: 2025-08-11 | Stop reason: HOSPADM

## 2025-08-10 RX ORDER — 0.9 % SODIUM CHLORIDE 0.9 %
1000 INTRAVENOUS SOLUTION INTRAVENOUS ONCE
Status: COMPLETED | OUTPATIENT
Start: 2025-08-10 | End: 2025-08-10

## 2025-08-10 RX ADMIN — SODIUM CHLORIDE 1000 ML: 0.9 INJECTION, SOLUTION INTRAVENOUS at 22:19

## 2025-08-10 RX ADMIN — PANTOPRAZOLE SODIUM 40 MG: 40 INJECTION, POWDER, FOR SOLUTION INTRAVENOUS at 22:22

## 2025-08-10 ASSESSMENT — LIFESTYLE VARIABLES
HOW MANY STANDARD DRINKS CONTAINING ALCOHOL DO YOU HAVE ON A TYPICAL DAY: 1 OR 2
HOW OFTEN DO YOU HAVE A DRINK CONTAINING ALCOHOL: 4 OR MORE TIMES A WEEK

## 2025-08-10 ASSESSMENT — PAIN SCALES - GENERAL: PAINLEVEL_OUTOF10: 0

## 2025-08-10 ASSESSMENT — PAIN - FUNCTIONAL ASSESSMENT: PAIN_FUNCTIONAL_ASSESSMENT: 0-10

## 2025-08-11 VITALS
HEIGHT: 66 IN | WEIGHT: 175.8 LBS | HEART RATE: 72 BPM | TEMPERATURE: 98 F | BODY MASS INDEX: 28.25 KG/M2 | SYSTOLIC BLOOD PRESSURE: 106 MMHG | OXYGEN SATURATION: 92 % | RESPIRATION RATE: 21 BRPM | DIASTOLIC BLOOD PRESSURE: 74 MMHG

## 2025-08-11 LAB
APPEARANCE UR: CLEAR
BACTERIA URNS QL MICRO: 0 /HPF
BILIRUB UR QL: NEGATIVE
COLOR UR: YELLOW
EKG ATRIAL RATE: 69 BPM
EKG DIAGNOSIS: NORMAL
EKG P AXIS: 80 DEGREES
EKG P-R INTERVAL: 138 MS
EKG Q-T INTERVAL: 394 MS
EKG QRS DURATION: 120 MS
EKG QTC CALCULATION (BAZETT): 423 MS
EKG R AXIS: 73 DEGREES
EKG T AXIS: 66 DEGREES
EKG VENTRICULAR RATE: 69 BPM
EPI CELLS #/AREA URNS HPF: NORMAL /HPF
GLUCOSE UR STRIP.AUTO-MCNC: NEGATIVE MG/DL
HGB UR QL STRIP: ABNORMAL
KETONES UR QL STRIP.AUTO: NEGATIVE MG/DL
LEUKOCYTE ESTERASE UR QL STRIP.AUTO: NEGATIVE
MUCOUS THREADS URNS QL MICRO: 0 /LPF
NITRITE UR QL STRIP.AUTO: NEGATIVE
OTHER OBSERVATIONS: NORMAL
PH UR STRIP: 5 (ref 5–9)
PROT UR STRIP-MCNC: NEGATIVE MG/DL
RBC #/AREA URNS HPF: NORMAL /HPF
SP GR UR REFRACTOMETRY: 1.01 (ref 1–1.02)
TROPONIN T SERPL HS-MCNC: 7.5 NG/L (ref 0–14)
UROBILINOGEN UR QL STRIP.AUTO: 0.2 EU/DL (ref 0.2–1)
WBC URNS QL MICRO: 0 /HPF

## 2025-08-11 PROCEDURE — 93010 ELECTROCARDIOGRAM REPORT: CPT | Performed by: INTERNAL MEDICINE

## 2025-08-11 RX ORDER — ONDANSETRON 4 MG/1
4 TABLET, ORALLY DISINTEGRATING ORAL EVERY 8 HOURS PRN
Qty: 15 TABLET | Refills: 0 | Status: SHIPPED | OUTPATIENT
Start: 2025-08-11

## 2025-08-11 ASSESSMENT — PAIN SCALES - GENERAL: PAINLEVEL_OUTOF10: 0

## 2025-08-21 ENCOUNTER — OFFICE VISIT (OUTPATIENT)
Dept: FAMILY MEDICINE CLINIC | Facility: CLINIC | Age: 80
End: 2025-08-21

## 2025-08-21 VITALS
DIASTOLIC BLOOD PRESSURE: 86 MMHG | SYSTOLIC BLOOD PRESSURE: 132 MMHG | HEART RATE: 85 BPM | TEMPERATURE: 97.2 F | BODY MASS INDEX: 27.6 KG/M2 | WEIGHT: 171 LBS | OXYGEN SATURATION: 98 %

## 2025-08-21 DIAGNOSIS — K21.9 GASTROESOPHAGEAL REFLUX DISEASE, UNSPECIFIED WHETHER ESOPHAGITIS PRESENT: ICD-10-CM

## 2025-08-21 DIAGNOSIS — I10 PRIMARY HYPERTENSION: ICD-10-CM

## 2025-08-21 DIAGNOSIS — R23.9 RECENT SKIN CHANGES: ICD-10-CM

## 2025-08-21 DIAGNOSIS — R10.11 RUQ PAIN: Primary | ICD-10-CM

## 2025-08-21 ASSESSMENT — ENCOUNTER SYMPTOMS
COUGH: 0
CHEST TIGHTNESS: 0
DIARRHEA: 0
CONSTIPATION: 0
SHORTNESS OF BREATH: 0
EYE DISCHARGE: 0
ABDOMINAL PAIN: 0
SINUS PAIN: 0

## 2025-09-02 ENCOUNTER — HOSPITAL ENCOUNTER (OUTPATIENT)
Dept: ULTRASOUND IMAGING | Age: 80
Discharge: HOME | End: 2025-09-04

## 2025-09-02 DIAGNOSIS — R10.11 RUQ PAIN: ICD-10-CM

## (undated) DEVICE — BASIC SINGLE BASIN 2-LF: Brand: MEDLINE INDUSTRIES, INC.

## (undated) DEVICE — GLOVE SURG SZ 65 THK91MIL LTX FREE SYN POLYISOPRENE

## (undated) DEVICE — DRESSING HYDROFIBER AQUACEL AG ADVANTAGE 3.5X10 IN

## (undated) DEVICE — STRYKER PERFORMANCE SERIES SAGITTAL BLADE: Brand: STRYKER PERFORMANCE SERIES

## (undated) DEVICE — SYRINGE MED 50ML LUERLOCK TIP

## (undated) DEVICE — STERILE PVP: Brand: MEDLINE INDUSTRIES, INC.

## (undated) DEVICE — ZIP DS DRESSING SHIELD: Brand: ZIP DS DRESSING SHIELD

## (undated) DEVICE — SUTURE ETHIBOND EXCEL SZ 5 L30IN NONABSORBABLE GRN L40MM V-37 MB66G

## (undated) DEVICE — 3M™ STERI-DRAPE™ INSTRUMENT POUCH 1018: Brand: STERI-DRAPE™

## (undated) DEVICE — SYRINGE CATH TIP 50ML

## (undated) DEVICE — SUIT SURG ISOLATN ZIP TOGA XL T7 +

## (undated) DEVICE — YANKAUER,FLEXIBLE HANDLE,REGLR CAPACITY: Brand: MEDLINE INDUSTRIES, INC.

## (undated) DEVICE — GOWN,REINFORCED,POLY,SIRUS,XLNG/XXLG: Brand: MEDLINE

## (undated) DEVICE — SOLUTION IRRIG 1000ML STRL H2O USP PLAS POUR BTL

## (undated) DEVICE — SOLUTION IRRIG 1000ML 0.9% SOD CHL USP POUR PLAS BTL

## (undated) DEVICE — NEEDLE HYPO 21GA L1.5IN INTRAMUSCULAR S STL LATCH BVL UP

## (undated) DEVICE — GLOVE SURG SZ 75 CRM LTX FREE POLYISOPRENE POLYMER BEAD ANTI

## (undated) DEVICE — SUTURE VICRYL SZ 1 L36IN ABSRB UD CTX L48MM 1/2 CIR J977H

## (undated) DEVICE — SOLUTION IV 250ML 0.9% SOD CHL PH 5 INJ USP VIAFLX PLAS

## (undated) DEVICE — BANDAGE COBAN 4 IN COMPR W4INXL5YD FOAM COHESIVE QUIK STK SELF ADH SFT

## (undated) DEVICE — HEWSON SUTURE RETRIEVER: Brand: HEWSON SUTURE RETRIEVER

## (undated) DEVICE — SUTURE VICRYL + SZ 2-0 L27IN ABSRB UD CP-1 1/2 CIR REV CUT VCP266H

## (undated) DEVICE — NEEDLE SPNL 22GA L3.5IN BLK HUB S STL REG WALL FIT STYL

## (undated) DEVICE — SUTURE VICRYL SZ 1 L27IN ABSRB UD L36MM CP-1 1/2 CIR REV CUT J268H

## (undated) DEVICE — DRAPE,TOP,102X53,STERILE: Brand: MEDLINE

## (undated) DEVICE — SOLUTION IRRIG 3000ML 0.9% SOD CHL USP UROMATIC PLAS CONT

## (undated) DEVICE — 3M™ IOBAN™ 2 ANTIMICROBIAL INCISE DRAPE 6651EZ: Brand: IOBAN™ 2

## (undated) DEVICE — BIPOLAR SEALER 23-112-1 AQM 6.0: Brand: AQUAMANTYS ®

## (undated) DEVICE — SOLUTION WND IRRIGATION 450 ML 0.5 PVP-I 0.9 NACL

## (undated) DEVICE — SUTURE ABS ANTIBACT 1-0 CTX 24IN STRATAFIX PDS+ SXPP1A445

## (undated) DEVICE — ZIP 16 SURGICAL SKIN CLOSURE DEVICE: Brand: ZIP 16 SURGICAL SKIN CLOSURE DEVICE

## (undated) DEVICE — GLOVE SURG SZ 8 L12IN FNGR THK79MIL GRN LTX FREE

## (undated) DEVICE — STERILE SYNTHETIC POLYISOPRENE POWDER-FREE SURGICAL GLOVES WITH HYDROGEL COATING, SMOOTH FINISH, STRAIGHT FINGER: Brand: PROTEXIS

## (undated) DEVICE — TOTAL HIP PACK: Brand: MEDLINE INDUSTRIES, INC.

## (undated) DEVICE — FOAM BUMP, LARGE: Brand: MEDLINE INDUSTRIES, INC.

## (undated) DEVICE — GLOVE ORANGE PI 8   MSG9080

## (undated) DEVICE — COVER,MAYO STAND,XL,STERILE: Brand: MEDLINE

## (undated) DEVICE — TUBE SUCTION FRAZIER 12 FRX5.5 IN MALL ANGLED W/ HOLE

## (undated) DEVICE — SUTURE PDS II SZ 1 L96IN ABSRB VLT TP-1 L65MM 1/2 CIR Z880G

## (undated) DEVICE — HOOD: Brand: T7PLUS